# Patient Record
Sex: MALE | Race: WHITE
[De-identification: names, ages, dates, MRNs, and addresses within clinical notes are randomized per-mention and may not be internally consistent; named-entity substitution may affect disease eponyms.]

---

## 2019-11-19 ENCOUNTER — HOSPITAL ENCOUNTER (EMERGENCY)
Dept: HOSPITAL 62 - ER | Age: 49
Discharge: HOME | End: 2019-11-19
Payer: SELF-PAY

## 2019-11-19 VITALS — SYSTOLIC BLOOD PRESSURE: 156 MMHG | DIASTOLIC BLOOD PRESSURE: 105 MMHG

## 2019-11-19 DIAGNOSIS — R00.0: ICD-10-CM

## 2019-11-19 DIAGNOSIS — F10.239: ICD-10-CM

## 2019-11-19 DIAGNOSIS — I10: Primary | ICD-10-CM

## 2019-11-19 DIAGNOSIS — Z79.899: ICD-10-CM

## 2019-11-19 DIAGNOSIS — F17.200: ICD-10-CM

## 2019-11-19 LAB
ADD MANUAL DIFF: NO
ALBUMIN SERPL-MCNC: 5.1 G/DL (ref 3.5–5)
ALP SERPL-CCNC: 98 U/L (ref 38–126)
ANION GAP SERPL CALC-SCNC: 14 MMOL/L (ref 5–19)
APAP SERPL-MCNC: < 10 UG/ML (ref 10–30)
APPEARANCE UR: (no result)
APTT PPP: (no result) S
AST SERPL-CCNC: 205 U/L (ref 17–59)
BARBITURATES UR QL SCN: NEGATIVE
BASOPHILS # BLD AUTO: 0 10^3/UL (ref 0–0.2)
BASOPHILS NFR BLD AUTO: 0.5 % (ref 0–2)
BILIRUB DIRECT SERPL-MCNC: 0.3 MG/DL (ref 0–0.4)
BILIRUB SERPL-MCNC: 1.1 MG/DL (ref 0.2–1.3)
BILIRUB UR QL STRIP: (no result)
BUN SERPL-MCNC: 8 MG/DL (ref 7–20)
CALCIUM: 9.6 MG/DL (ref 8.4–10.2)
CHLORIDE SERPL-SCNC: 98 MMOL/L (ref 98–107)
CO2 SERPL-SCNC: 27 MMOL/L (ref 22–30)
EOSINOPHIL # BLD AUTO: 0 10^3/UL (ref 0–0.6)
EOSINOPHIL NFR BLD AUTO: 0.2 % (ref 0–6)
ERYTHROCYTE [DISTWIDTH] IN BLOOD BY AUTOMATED COUNT: 14.4 % (ref 11.5–14)
ETHANOL SERPL-MCNC: < 10 MG/DL
GLUCOSE SERPL-MCNC: 116 MG/DL (ref 75–110)
GLUCOSE UR STRIP-MCNC: NEGATIVE MG/DL
HCT VFR BLD CALC: 47.8 % (ref 37.9–51)
HGB BLD-MCNC: 16.2 G/DL (ref 13.5–17)
KETONES UR STRIP-MCNC: 20 MG/DL
LYMPHOCYTES # BLD AUTO: 1.1 10^3/UL (ref 0.5–4.7)
LYMPHOCYTES NFR BLD AUTO: 14.5 % (ref 13–45)
MCH RBC QN AUTO: 30.9 PG (ref 27–33.4)
MCHC RBC AUTO-ENTMCNC: 34 G/DL (ref 32–36)
MCV RBC AUTO: 91 FL (ref 80–97)
METHADONE UR QL SCN: NEGATIVE
MONOCYTES # BLD AUTO: 0.4 10^3/UL (ref 0.1–1.4)
MONOCYTES NFR BLD AUTO: 5 % (ref 3–13)
NEUTROPHILS # BLD AUTO: 6.1 10^3/UL (ref 1.7–8.2)
NEUTS SEG NFR BLD AUTO: 79.8 % (ref 42–78)
NITRITE UR QL STRIP: NEGATIVE
PCP UR QL SCN: NEGATIVE
PH UR STRIP: 5 [PH] (ref 5–9)
PLATELET # BLD: 130 10^3/UL (ref 150–450)
POTASSIUM SERPL-SCNC: 3.4 MMOL/L (ref 3.6–5)
PROT SERPL-MCNC: 8.8 G/DL (ref 6.3–8.2)
PROT UR STRIP-MCNC: >=500 MG/DL
RBC # BLD AUTO: 5.25 10^6/UL (ref 4.35–5.55)
SALICYLATES SERPL-MCNC: < 1 MG/DL (ref 2–20)
SP GR UR STRIP: 1.03
TOTAL CELLS COUNTED % (AUTO): 100 %
URINE AMPHETAMINES SCREEN: NEGATIVE
URINE BENZODIAZEPINES SCREEN: (no result)
URINE COCAINE SCREEN: NEGATIVE
URINE MARIJUANA (THC) SCREEN: NEGATIVE
UROBILINOGEN UR-MCNC: NEGATIVE MG/DL (ref ?–2)
WBC # BLD AUTO: 7.7 10^3/UL (ref 4–10.5)

## 2019-11-19 PROCEDURE — 96375 TX/PRO/DX INJ NEW DRUG ADDON: CPT

## 2019-11-19 PROCEDURE — 85025 COMPLETE CBC W/AUTO DIFF WBC: CPT

## 2019-11-19 PROCEDURE — 80307 DRUG TEST PRSMV CHEM ANLYZR: CPT

## 2019-11-19 PROCEDURE — 36415 COLL VENOUS BLD VENIPUNCTURE: CPT

## 2019-11-19 PROCEDURE — 93010 ELECTROCARDIOGRAM REPORT: CPT

## 2019-11-19 PROCEDURE — 80053 COMPREHEN METABOLIC PANEL: CPT

## 2019-11-19 PROCEDURE — 99285 EMERGENCY DEPT VISIT HI MDM: CPT

## 2019-11-19 PROCEDURE — 96365 THER/PROPH/DIAG IV INF INIT: CPT

## 2019-11-19 PROCEDURE — 93005 ELECTROCARDIOGRAM TRACING: CPT

## 2019-11-19 PROCEDURE — 81001 URINALYSIS AUTO W/SCOPE: CPT

## 2019-11-19 PROCEDURE — 96361 HYDRATE IV INFUSION ADD-ON: CPT

## 2019-11-19 NOTE — EKG REPORT
SEVERITY:- ABNORMAL ECG -

SINUS TACHYCARDIA

PROBABLE INFERIOR INFARCT, OLD , CLINICAL CORRELATION NEEDED.

:

Confirmed by: Venu Montague MD 19-Nov-2019 12:11:43

## 2019-11-19 NOTE — ER DOCUMENT REPORT
ED Medical Screen (RME)





- General


Chief Complaint: Alcohol Withdrawl


Stated Complaint: ALCOHOL WITHDRAWL


Time Seen by Provider: 11/19/19 11:26


Mode of Arrival: Ambulatory


Information source: Patient


Notes: 





This 48-year-old male presents emergency department with high blood pressure.  

Patient reports he went to [facility] but they sent him over here because 

his vital signs were off.  Patient reports he has a history of high blood 

pressure has not taken his medications this morning.  Patient reports he drinks 

1/5 of whiskey every day for the past 20 years.  He reports he feels a little 

anxious but denies chest pain shortness of breath.  Patient was instructed to 

take his morning antihypertensive medications which were clonidine amlodipine 

and atenolol.








I have greeted and performed a rapid initial assessment of this patient.  A 

comprehensive ED assessment and evaluation of the patient, analysis of test 

results and completion of the medical decision making process will be conducted 

by additional ED providers.  Dictation of this chart was performed using voice 

recognition software; therefore, there may be some unintended grammatical 

errors.





- Related Data


Allergies/Adverse Reactions: 


                                        





No Known Allergies Allergy (Verified 11/19/19 11:26)


   











Physical Exam





- Vital signs


Vitals: 





                                        











Temp Pulse Resp BP Pulse Ox


 


 97.9 F   137 H  22 H  211/133 H  98 


 


 11/19/19 11:21  11/19/19 11:21  11/19/19 11:21  11/19/19 11:21  11/19/19 11:21














Course





- Vital Signs


Vital signs: 





                                        











Temp Pulse Resp BP Pulse Ox


 


 97.9 F   137 H  22 H  211/133 H  98 


 


 11/19/19 11:21  11/19/19 11:21  11/19/19 11:21  11/19/19 11:21  11/19/19 11:21

## 2019-12-31 ENCOUNTER — HOSPITAL ENCOUNTER (INPATIENT)
Dept: HOSPITAL 62 - ER | Age: 49
LOS: 4 days | Discharge: HOME | DRG: 897 | End: 2020-01-04
Attending: INTERNAL MEDICINE | Admitting: INTERNAL MEDICINE
Payer: COMMERCIAL

## 2019-12-31 DIAGNOSIS — Z79.899: ICD-10-CM

## 2019-12-31 DIAGNOSIS — Z78.1: ICD-10-CM
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
## 2019-12-31 DIAGNOSIS — Z28.21: ICD-10-CM

## 2019-12-31 DIAGNOSIS — E66.9: ICD-10-CM

## 2019-12-31 DIAGNOSIS — F32.9: ICD-10-CM

## 2019-12-31 DIAGNOSIS — E87.6: ICD-10-CM

## 2019-12-31 DIAGNOSIS — F17.210: ICD-10-CM

## 2019-12-31 DIAGNOSIS — F43.10: ICD-10-CM

## 2019-12-31 DIAGNOSIS — I10: ICD-10-CM

## 2019-12-31 DIAGNOSIS — F10.231: Primary | ICD-10-CM

## 2019-12-31 DIAGNOSIS — E83.42: ICD-10-CM

## 2019-12-31 LAB
ABSOLUTE LYMPHOCYTES# (MANUAL): 1.8 10^3/UL (ref 0.5–4.7)
ABSOLUTE MONOCYTES # (MANUAL): 0.5 10^3/UL (ref 0.1–1.4)
ADD MANUAL DIFF: YES
ALBUMIN SERPL-MCNC: 5.2 G/DL (ref 3.5–5)
ALP SERPL-CCNC: 145 U/L (ref 38–126)
ANION GAP SERPL CALC-SCNC: 16 MMOL/L (ref 5–19)
ANION GAP SERPL CALC-SCNC: 31 MMOL/L (ref 5–19)
ANISOCYTOSIS BLD QL SMEAR: SLIGHT
APAP SERPL-MCNC: < 10 UG/ML (ref 10–30)
APPEARANCE UR: (no result)
APTT BLD: 25 SEC (ref 23.5–35.8)
APTT PPP: (no result) S
AST SERPL-CCNC: 375 U/L (ref 17–59)
BARBITURATES UR QL SCN: NEGATIVE
BASOPHILS NFR BLD MANUAL: 0 % (ref 0–2)
BILIRUB DIRECT SERPL-MCNC: 1.1 MG/DL (ref 0–0.4)
BILIRUB SERPL-MCNC: 1.9 MG/DL (ref 0.2–1.3)
BILIRUB UR QL STRIP: NEGATIVE
BUN SERPL-MCNC: 3 MG/DL (ref 7–20)
BUN SERPL-MCNC: 4 MG/DL (ref 7–20)
CALCIUM: 7.7 MG/DL (ref 8.4–10.2)
CALCIUM: 9.6 MG/DL (ref 8.4–10.2)
CHLORIDE SERPL-SCNC: 86 MMOL/L (ref 98–107)
CHLORIDE SERPL-SCNC: 95 MMOL/L (ref 98–107)
CO2 SERPL-SCNC: 20 MMOL/L (ref 22–30)
CO2 SERPL-SCNC: 25 MMOL/L (ref 22–30)
EOSINOPHIL NFR BLD MANUAL: 0 % (ref 0–6)
ERYTHROCYTE [DISTWIDTH] IN BLOOD BY AUTOMATED COUNT: 15.6 % (ref 11.5–14)
ETHANOL SERPL-MCNC: 32 MG/DL
GLUCOSE SERPL-MCNC: 110 MG/DL (ref 75–110)
GLUCOSE SERPL-MCNC: 161 MG/DL (ref 75–110)
GLUCOSE UR STRIP-MCNC: 50 MG/DL
HCT VFR BLD CALC: 46.3 % (ref 37.9–51)
HGB BLD-MCNC: 16.5 G/DL (ref 13.5–17)
INR PPP: 0.97
KETONES UR STRIP-MCNC: 20 MG/DL
MCH RBC QN AUTO: 31.5 PG (ref 27–33.4)
MCHC RBC AUTO-ENTMCNC: 35.5 G/DL (ref 32–36)
MCV RBC AUTO: 89 FL (ref 80–97)
METHADONE UR QL SCN: NEGATIVE
MONOCYTES % (MANUAL): 5 % (ref 3–13)
NITRITE UR QL STRIP: NEGATIVE
PCP UR QL SCN: NEGATIVE
PH UR STRIP: 6 [PH] (ref 5–9)
PLATELET # BLD: 120 10^3/UL (ref 150–450)
PLATELET COMMENT: (no result)
POTASSIUM SERPL-SCNC: 2.8 MMOL/L (ref 3.6–5)
POTASSIUM SERPL-SCNC: 2.8 MMOL/L (ref 3.6–5)
PROT SERPL-MCNC: 8.9 G/DL (ref 6.3–8.2)
PROT UR STRIP-MCNC: >=500 MG/DL
PROTHROMBIN TIME: 12.9 SEC (ref 11.4–15.4)
RBC # BLD AUTO: 5.23 10^6/UL (ref 4.35–5.55)
SALICYLATES SERPL-MCNC: < 1 MG/DL (ref 2–20)
SEGMENTED NEUTROPHILS % (MAN): 77 % (ref 42–78)
SP GR UR STRIP: 1.02
TOTAL CELLS COUNTED BLD: 100
URINE AMPHETAMINES SCREEN: NEGATIVE
URINE BENZODIAZEPINES SCREEN: (no result)
URINE COCAINE SCREEN: NEGATIVE
URINE MARIJUANA (THC) SCREEN: NEGATIVE
UROBILINOGEN UR-MCNC: 4 MG/DL (ref ?–2)
VARIANT LYMPHS NFR BLD MANUAL: 18 % (ref 13–45)
WBC # BLD AUTO: 10.2 10^3/UL (ref 4–10.5)

## 2019-12-31 PROCEDURE — 85025 COMPLETE CBC W/AUTO DIFF WBC: CPT

## 2019-12-31 PROCEDURE — 99291 CRITICAL CARE FIRST HOUR: CPT

## 2019-12-31 PROCEDURE — S0119 ONDANSETRON 4 MG: HCPCS

## 2019-12-31 PROCEDURE — 80053 COMPREHEN METABOLIC PANEL: CPT

## 2019-12-31 PROCEDURE — 93005 ELECTROCARDIOGRAM TRACING: CPT

## 2019-12-31 PROCEDURE — 96376 TX/PRO/DX INJ SAME DRUG ADON: CPT

## 2019-12-31 PROCEDURE — 96368 THER/DIAG CONCURRENT INF: CPT

## 2019-12-31 PROCEDURE — 83735 ASSAY OF MAGNESIUM: CPT

## 2019-12-31 PROCEDURE — 82962 GLUCOSE BLOOD TEST: CPT

## 2019-12-31 PROCEDURE — 96365 THER/PROPH/DIAG IV INF INIT: CPT

## 2019-12-31 PROCEDURE — 84132 ASSAY OF SERUM POTASSIUM: CPT

## 2019-12-31 PROCEDURE — 36415 COLL VENOUS BLD VENIPUNCTURE: CPT

## 2019-12-31 PROCEDURE — 83690 ASSAY OF LIPASE: CPT

## 2019-12-31 PROCEDURE — 96361 HYDRATE IV INFUSION ADD-ON: CPT

## 2019-12-31 PROCEDURE — 99238 HOSP IP/OBS DSCHRG MGMT 30/<: CPT

## 2019-12-31 PROCEDURE — 80307 DRUG TEST PRSMV CHEM ANLYZR: CPT

## 2019-12-31 PROCEDURE — 85610 PROTHROMBIN TIME: CPT

## 2019-12-31 PROCEDURE — 93010 ELECTROCARDIOGRAM REPORT: CPT

## 2019-12-31 PROCEDURE — 85027 COMPLETE CBC AUTOMATED: CPT

## 2019-12-31 PROCEDURE — 80048 BASIC METABOLIC PNL TOTAL CA: CPT

## 2019-12-31 PROCEDURE — 81001 URINALYSIS AUTO W/SCOPE: CPT

## 2019-12-31 PROCEDURE — 96375 TX/PRO/DX INJ NEW DRUG ADDON: CPT

## 2019-12-31 PROCEDURE — 99232 SBSQ HOSP IP/OBS MODERATE 35: CPT

## 2019-12-31 PROCEDURE — 85730 THROMBOPLASTIN TIME PARTIAL: CPT

## 2019-12-31 RX ADMIN — MAGNESIUM SULFATE IN DEXTROSE SCH MLS/HR: 10 INJECTION, SOLUTION INTRAVENOUS at 14:44

## 2019-12-31 RX ADMIN — SODIUM CHLORIDE PRN MLS/HR: 9 INJECTION, SOLUTION INTRAVENOUS at 13:33

## 2019-12-31 RX ADMIN — POTASSIUM CHLORIDE SCH MLS/HR: 29.8 INJECTION, SOLUTION INTRAVENOUS at 23:29

## 2019-12-31 RX ADMIN — SODIUM CHLORIDE PRN MLS/HR: 9 INJECTION, SOLUTION INTRAVENOUS at 13:01

## 2019-12-31 RX ADMIN — METOCLOPRAMIDE SCH MLS/HR: 5 INJECTION, SOLUTION INTRAMUSCULAR; INTRAVENOUS at 18:00

## 2019-12-31 RX ADMIN — POTASSIUM CHLORIDE SCH MLS/HR: 29.8 INJECTION, SOLUTION INTRAVENOUS at 20:46

## 2019-12-31 RX ADMIN — DEXMEDETOMIDINE HYDROCHLORIDE PRN MLS/HR: 4 INJECTION, SOLUTION INTRAVENOUS at 17:45

## 2019-12-31 RX ADMIN — Medication SCH: at 21:00

## 2019-12-31 RX ADMIN — MAGNESIUM SULFATE IN DEXTROSE SCH MLS/HR: 10 INJECTION, SOLUTION INTRAVENOUS at 15:24

## 2019-12-31 RX ADMIN — INSULIN HUMAN SCH: 100 INJECTION, SOLUTION PARENTERAL at 18:23

## 2019-12-31 RX ADMIN — LABETALOL HYDROCHLORIDE PRN MG: 5 INJECTION, SOLUTION INTRAVENOUS at 18:15

## 2019-12-31 RX ADMIN — SODIUM CHLORIDE, SODIUM LACTATE, POTASSIUM CHLORIDE, AND CALCIUM CHLORIDE PRN MLS/HR: .6; .31; .03; .02 INJECTION, SOLUTION INTRAVENOUS at 20:41

## 2019-12-31 NOTE — CRITICAL CARE ADMISSION REPORT
HPI


Date:: 12/31/19 - Critical Care Attending Note


HPI: 





Pt is a 50 yo man with alcohol abuse who drinks 1/5 Vodka each day. His last 

drink was around 2 am this am. He presents to the ED with N/V, HTN, tremors. He 

was found to be in alcohol withdrawal. He received several dosed of IV ativan 

and was ultimately started on an ativan infusion. Upon my assessment of the pt, 

he is still in the ED awaiting an ICU bed. He states he has tried to detox from 

ETOH before and ended up in the hospital for 5 days for ETOH withdrawal. He 

states he has never had to be intubated. He denies any 

F/C/CP/SOA/Melena/hemoptysis/hematochezia. 





Past Medical History


Past Medical History: 





HTN


Depression


Pulmonary Medical History: Reports: None





Social/Family History





- Social History


Smoking Status: Current Every Day Smoker


Frequency of Alcohol Use: Heavy


Last Alcohol Use: 12/31/19





- Medication/Allergies


Home Medications: 








Amlodipine Besylate [Norvasc 10 mg Tablet] 1 mg PO DAILY 12/31/19 


Atenolol 100 mg PO DAILY 12/31/19 








Allergies/Adverse Reactions: 


                                        





No Known Allergies Allergy (Verified 11/19/19 11:26)


   











Review of Systems


Review of Systems: 





per HPI





Physical Exam


Vital Signs: 


                                        











Temp Pulse Resp BP Pulse Ox


 


 98.2 F   139 H  29 H  158/90 H  94 


 


 12/31/19 16:05  12/31/19 11:04  12/31/19 16:05  12/31/19 16:05  12/31/19 16:05








                                 Intake & Output











 12/30/19 12/31/19 01/01/20





 06:59 06:59 06:59


 


Intake Total   1600


 


Balance   1600


 


Weight   109.5 kg








                                  Weight/Height





Weight                           109.5 kg


Height                           5 ft 9 in








General appearance: PRESENT: well-developed, well-nourished, other - Ill-

apperating, tremulous, diaphoretic


Head exam: PRESENT: atraumatic, normocephalic


Respiratory exam: PRESENT: clear to auscultation cesar, unlabored


Cardiovascular exam: PRESENT: tachycardia


GI/Abdominal exam: PRESENT: soft, other - non-tender,non-distended


Extremities exam: PRESENT: other - no edema


Neurological exam: PRESENT: alert, awake, other - tremulous


Psychiatric exam: PRESENT: anxious





Laboratory/Radiographs


Laboratory Results: 


                                        





                                 12/31/19 13:00 





                                 12/31/19 13:00 





                                        











  12/31/19 12/31/19





  13:00 13:00


 


WBC  10.2 


 


RBC  5.23 


 


Hgb  16.5 


 


Hct  46.3 


 


MCV  89 


 


MCH  31.5 


 


MCHC  35.5 


 


RDW  15.6 H 


 


Plt Count  120 L 


 


Seg Neutrophils %  Not Reportable 


 


Sodium   137.1


 


Potassium   2.8 L*


 


Chloride   86 L


 


Carbon Dioxide   20 L


 


Anion Gap   31 H


 


BUN   4 L


 


Creatinine   1.02


 


Est GFR (African Amer)   > 60


 


Glucose   161 H


 


Calcium   9.6


 


Magnesium   1.0 L*


 


Total Bilirubin   1.9 H


 


AST   375 H


 


Alkaline Phosphatase   145 H


 


Total Protein   8.9 H


 


Albumin   5.2 H


 


Lipase   361.1 H














Critical Time


Critical Time (minutes): 45


-: 


The care of a critically ill patient is dynamic.  This note represents a static 

moment in the admission process. Orders and treatments may be given 

simultaneously and urgently, and time is not representative of the treatment 

process.





This patient requires Critical Care secondary to life threatening organ or limb 

dysfunction.  Without Critical Care services, the patient is at risk for 

increased mortality and morbidity.








Provider Note


Provider Note: 





Assessment: Critically ill 50yo man with acute alcohol withdrawal, alcohol 

abuse, HTN





Plan:


1. Respiratory: stable on NC. Continue to monitor respiratory status closely


2. CV.HTN. Resume home BP meds. Prn labetalol and hydralazine


3. Pscyh: acute alcohol withdrawal. Continue ativan drip while in ED. Once pt 

gets to ICU, will d/c ativan drip and start precedex. CIWA scale. IV thiamine 

and folic acid.


4. Electroyles: hypokalemia and hypomagnesemia. Potassium and magnesium 

replaced. Will repeat potassium and magnesium levels later today


5. Nutrition:NPO


6. Endocrine: monitor blood sugars


7. Prophylaxis: sq heparin





Critical care time= 45 min, excluding procedures

## 2019-12-31 NOTE — EKG REPORT
SEVERITY:- ABNORMAL ECG -

SINUS TACHYCARDIA

BORDERLINE INFERIOR Q WAVES

REPOL ABNRM SUGGESTS ISCHEMIA, DIFFUSE LEADS

BIATRIAL ENLARGEMENT

:

Confirmed by: Venu Montague MD 31-Dec-2019 13:43:04

## 2019-12-31 NOTE — ER DOCUMENT REPORT
ED General





- General


Chief Complaint: Alcohol Withdrawl


Stated Complaint: ETOH WITHDRAWAL


Time Seen by Provider: 12/31/19 11:15


Primary Care Provider: 


VIDHYA SIMONS MD [Primary Care Provider] - Follow up as needed


Mode of Arrival: Ambulatory


Notes: 





49-year-old male presents with possible alcohol withdrawal.  Patient states he 

has been drinking 1/5 of bourbon every day for the last 10 years and last drink 

was approximately 2:00 this morning.  Patient states he feels anxious, has trem

ors, nausea/vomiting, abdominal pain, feeling sweaty for the past few days.  

Patient states he has gone into withdrawal before requiring admission however 

has never had seizures.


TRAVEL OUTSIDE OF THE [place] IN LAST 30 DAYS: No





- Related Data


Allergies/Adverse Reactions: 


                                        





No Known Allergies Allergy (Verified 11/19/19 11:26)


   











Past Medical History





- General


Information source: Patient





- Social History


Smoking Status: Current Every Day Smoker


Chew tobacco use (# tins/day): No


Frequency of alcohol use: 1/5th Alcohol daily


Drug Abuse: None


Family History: Reviewed & Not Pertinent


Patient has suicidal ideation: No


Patient has homicidal ideation: No





Review of Systems





- Review of Systems


Notes: 





Constitutional: Positive for anxiety and diaphoresis.  Negative for fever.


HENT: Negative for sore throat.


Eyes: Negative for visual changes.


Cardiovascular: Negative for chest pain.


Respiratory: Negative for shortness of breath.


Gastrointestinal: Positive for abdominal pain, nausea, vomiting.  Negative for 

diarrhea.


Genitourinary: Negative for dysuria.


Musculoskeletal: Negative for back pain.


Skin: Negative for rash.


Neurological: Positive for tremors. Negative for headaches, weakness or 

numbness.





10 point ROS negative except as marked above and in HPI.





Physical Exam





- Vital signs


Vitals: 


                                        











Temp Pulse Resp BP Pulse Ox


 


 97.5 F   139 H  20   177/106 H  98 


 


 12/31/19 11:04  12/31/19 11:04  12/31/19 11:04  12/31/19 11:04  12/31/19 11:04














- Notes


Notes: 





GENERAL: Well-appearing, well-nourished and anxious.


HEAD: Atraumatic, normocephalic.


EYES: Extraocular movements intact, sclera anicteric, conjunctiva are normal.


NECK: Normal range of motion, supple without lymphadenopathy or JVD.


LUNGS: Breath sounds clear to auscultation bilaterally and equal.  No wheezes 

rales or rhonchi.


HEART: Regular rate and rhythm without murmurs, rubs or gallops.


ABDOMEN: Soft, nontender.  No guarding, no rebound.  No masses appreciated.


EXTREMITIES: Normal range of motion, no pitting or edema.  No clubbing or 

cyanosis.


NEUROLOGICAL: Cranial nerves II through XII grossly intact.  Normal speech, 

normal gait.


PSYCH: Normal mood, normal affect.


SKIN: Warm, Dry, normal turgor, no rashes or lesions noted.





Course





- Re-evaluation


Re-evalutation: 





12/31/19 11:56 patient presents for possible alcohol withdrawal.  Patient has 

tremors, anxiety, abdominal pain, nausea/vomiting.  Patient appears anxious.  

Patient abdomen soft nontender.  CIWA ordered along with lab work.  Vistaril, 

Zofran, Valium ordered.





12/31/19 12:17 CIWA 13





12/31/19 13:12 Pt continues to have tremor and elevated BP. Discussed pt with 

Dr. Kruse, attending, who recommended another dose of Valium and Ativan IV and

admission once labwork resulted. 





12/31/19 14:07 Pt's Mg is 1.0 and K is 2.8.





12/31/19 14:14 Discussed pt with Dr. Ray, hospitalist, who stated that pt may

need ICU due to benzo drip. Discussed with Dr. Mackenzie, intensivist, who 

accepted pt for admission.








- Vital Signs


Vital signs: 


                                        











Temp Pulse Resp BP Pulse Ox


 


 97.5 F   139 H  20   203/135 H  96 


 


 12/31/19 11:04  12/31/19 11:04  12/31/19 14:00  12/31/19 13:01  12/31/19 14:00














- Laboratory


Result Diagrams: 


                                 12/31/19 13:00





                                 12/31/19 13:00


Laboratory results interpreted by me: 


                                        











  12/31/19 12/31/19





  13:00 13:00


 


RDW  15.6 H 


 


Plt Count  120 L 


 


Potassium   2.8 L*


 


Chloride   86 L


 


Carbon Dioxide   20 L


 


Anion Gap   31 H


 


BUN   4 L


 


Glucose   161 H


 


Magnesium   1.0 L*


 


Total Bilirubin   1.9 H


 


Direct Bilirubin   1.1 H


 


AST   375 H


 


Alkaline Phosphatase   145 H


 


Total Protein   8.9 H


 


Albumin   5.2 H


 


Lipase   361.1 H


 


Salicylates   < 1.0 L


 


Acetaminophen   < 10 L














Discharge





- Discharge


Clinical Impression: 


 Hypokalemia, Hypomagnesemia





Alcohol withdrawal


Qualifiers:


 Complication of substance-induced condition: with unspecified complication 

Qualified Code(s): F10.239 - Alcohol dependence with withdrawal, unspecified





Condition: Stable


Disposition: ADMITTED AS INPATIENT


Admitting Provider: Gurdeep (Intensivist)


Unit Admitted: ICU


Referrals: 


VIDHYA SIMONS MD [Primary Care Provider] - Follow up as needed

## 2020-01-01 LAB
ANION GAP SERPL CALC-SCNC: 12 MMOL/L (ref 5–19)
ANION GAP SERPL CALC-SCNC: 15 MMOL/L (ref 5–19)
BUN SERPL-MCNC: 2 MG/DL (ref 7–20)
BUN SERPL-MCNC: 2 MG/DL (ref 7–20)
CALCIUM: 8 MG/DL (ref 8.4–10.2)
CALCIUM: 8.6 MG/DL (ref 8.4–10.2)
CHLORIDE SERPL-SCNC: 90 MMOL/L (ref 98–107)
CHLORIDE SERPL-SCNC: 95 MMOL/L (ref 98–107)
CO2 SERPL-SCNC: 29 MMOL/L (ref 22–30)
CO2 SERPL-SCNC: 29 MMOL/L (ref 22–30)
ERYTHROCYTE [DISTWIDTH] IN BLOOD BY AUTOMATED COUNT: 15.3 % (ref 11.5–14)
GLUCOSE SERPL-MCNC: 111 MG/DL (ref 75–110)
GLUCOSE SERPL-MCNC: 132 MG/DL (ref 75–110)
HCT VFR BLD CALC: 37.6 % (ref 37.9–51)
HGB BLD-MCNC: 13.2 G/DL (ref 13.5–17)
MCH RBC QN AUTO: 31.5 PG (ref 27–33.4)
MCHC RBC AUTO-ENTMCNC: 35.2 G/DL (ref 32–36)
MCV RBC AUTO: 90 FL (ref 80–97)
PLATELET # BLD: 72 10^3/UL (ref 150–450)
POTASSIUM SERPL-SCNC: 2.7 MMOL/L (ref 3.6–5)
POTASSIUM SERPL-SCNC: 2.9 MMOL/L (ref 3.6–5)
RBC # BLD AUTO: 4.2 10^6/UL (ref 4.35–5.55)
WBC # BLD AUTO: 4.4 10^3/UL (ref 4–10.5)

## 2020-01-01 RX ADMIN — MULTIVITAMIN TABLET SCH TAB: TABLET at 10:35

## 2020-01-01 RX ADMIN — METOCLOPRAMIDE SCH MLS/HR: 5 INJECTION, SOLUTION INTRAMUSCULAR; INTRAVENOUS at 17:26

## 2020-01-01 RX ADMIN — SODIUM CHLORIDE, SODIUM LACTATE, POTASSIUM CHLORIDE, AND CALCIUM CHLORIDE PRN MLS/HR: .6; .31; .03; .02 INJECTION, SOLUTION INTRAVENOUS at 07:09

## 2020-01-01 RX ADMIN — Medication SCH: at 22:02

## 2020-01-01 RX ADMIN — POTASSIUM CHLORIDE SCH MLS/HR: 29.8 INJECTION, SOLUTION INTRAVENOUS at 05:39

## 2020-01-01 RX ADMIN — LORAZEPAM SCH: 1 TABLET ORAL at 17:33

## 2020-01-01 RX ADMIN — LORAZEPAM PRN MG: 2 INJECTION INTRAMUSCULAR; INTRAVENOUS at 17:33

## 2020-01-01 RX ADMIN — Medication SCH: at 05:22

## 2020-01-01 RX ADMIN — LORAZEPAM SCH MG: 1 TABLET ORAL at 22:00

## 2020-01-01 RX ADMIN — INSULIN HUMAN SCH: 100 INJECTION, SOLUTION PARENTERAL at 13:15

## 2020-01-01 RX ADMIN — POTASSIUM CHLORIDE SCH MLS/HR: 29.8 INJECTION, SOLUTION INTRAVENOUS at 03:36

## 2020-01-01 RX ADMIN — AMLODIPINE BESYLATE SCH: 10 TABLET ORAL at 10:35

## 2020-01-01 RX ADMIN — MAGNESIUM SULFATE IN DEXTROSE SCH MLS/HR: 10 INJECTION, SOLUTION INTRAVENOUS at 17:32

## 2020-01-01 RX ADMIN — DEXMEDETOMIDINE HYDROCHLORIDE PRN MLS/HR: 4 INJECTION, SOLUTION INTRAVENOUS at 01:06

## 2020-01-01 RX ADMIN — HEPARIN SODIUM SCH: 5000 INJECTION, SOLUTION INTRAVENOUS; SUBCUTANEOUS at 13:16

## 2020-01-01 RX ADMIN — ATENOLOL SCH: 50 TABLET ORAL at 10:36

## 2020-01-01 RX ADMIN — POTASSIUM CHLORIDE SCH MEQ: 750 TABLET, FILM COATED, EXTENDED RELEASE ORAL at 22:00

## 2020-01-01 RX ADMIN — POTASSIUM CHLORIDE SCH MEQ: 750 TABLET, FILM COATED, EXTENDED RELEASE ORAL at 13:29

## 2020-01-01 RX ADMIN — Medication SCH: at 13:29

## 2020-01-01 RX ADMIN — POTASSIUM CHLORIDE SCH MLS/HR: 29.8 INJECTION, SOLUTION INTRAVENOUS at 08:51

## 2020-01-01 RX ADMIN — INSULIN HUMAN SCH: 100 INJECTION, SOLUTION PARENTERAL at 05:46

## 2020-01-01 RX ADMIN — LORAZEPAM SCH: 1 TABLET ORAL at 17:34

## 2020-01-01 RX ADMIN — INSULIN HUMAN SCH: 100 INJECTION, SOLUTION PARENTERAL at 01:09

## 2020-01-01 RX ADMIN — HEPARIN SODIUM SCH: 5000 INJECTION, SOLUTION INTRAVENOUS; SUBCUTANEOUS at 22:03

## 2020-01-01 RX ADMIN — HYDROCHLOROTHIAZIDE SCH: 25 TABLET ORAL at 10:34

## 2020-01-01 RX ADMIN — MAGNESIUM SULFATE IN DEXTROSE SCH MLS/HR: 10 INJECTION, SOLUTION INTRAVENOUS at 13:29

## 2020-01-01 RX ADMIN — HEPARIN SODIUM SCH: 5000 INJECTION, SOLUTION INTRAVENOUS; SUBCUTANEOUS at 05:46

## 2020-01-01 RX ADMIN — SODIUM CHLORIDE, SODIUM LACTATE, POTASSIUM CHLORIDE, AND CALCIUM CHLORIDE PRN MLS/HR: .6; .31; .03; .02 INJECTION, SOLUTION INTRAVENOUS at 22:11

## 2020-01-01 NOTE — PDOC CRITICAL CARE PROG REPORT
General


Date:: 01/01/20


ICU Day:: 2


Hospital Day:: 2


Resuscitation Status: Full Code


Events in the past 12 to 24 Hours:: 





HR and BP stable. Not agitated.


Review of systems relevant to events:: 





Neuro, CV.


Reason for ICU Addmission:: Possible need for high dose ativan and intubation





- Medications:


Medications reviewed and adjusted accordingly: Yes


Vasopressors:: 





None


Sedation:: 





Precedex





Physical Exam


Vital Signs: 


                                        











Temp Pulse Resp BP Pulse Ox


 


 98.5 F   96   18   98/71 L  99 


 


 01/01/20 08:00  01/01/20 10:00  01/01/20 11:36  01/01/20 11:36  01/01/20 11:00








                                 Intake & Output











 12/31/19 01/01/20 01/02/20





 06:59 06:59 06:59


 


Intake Total  4434.2 1050


 


Output Total  875 200


 


Balance  3559.2 850


 


Weight  112.4 kg 








                                  Weight/Height





Weight                           112.4 kg


Height                           5 ft 9 in








General appearance: PRESENT: no acute distress, cooperative


Head exam: PRESENT: atraumatic, normocephalic


Eye exam: PRESENT: EOMI, PERRLA


Ear exam: PRESENT: normal external ear exam


Mouth exam: PRESENT: dry mucosa


Respiratory exam: PRESENT: clear to auscultation cesar.  ABSENT: rales, rhonchi, 

wheezes


Cardiovascular exam: PRESENT: RRR.  ABSENT: diastolic murmur, rubs, systolic 

murmur


GI/Abdominal exam: PRESENT: normal bowel sounds, soft.  ABSENT: distended, 

guarding, mass, organolmegaly, rebound, tenderness


Rectal exam: PRESENT: deferred


Extremities exam: PRESENT: full ROM.  ABSENT: calf tenderness, clubbing, pedal 

edema


Musculoskeletal exam: PRESENT: ambulatory


Neurological exam: PRESENT: alert, altered, awake, oriented to person, oriented 

to place, oriented to time, oriented to situation


Skin exam: PRESENT: dry, intact, warm.  ABSENT: cyanosis, rash





Laboratory/Radiographs


Laboratory Results: 


                                        





                                 01/01/20 02:27 





                                 01/01/20 06:07 





                                        











  12/31/19 12/31/19 12/31/19





  13:00 13:00 16:00


 


WBC  10.2  


 


RBC  5.23  


 


Hgb  16.5  


 


Hct  46.3  


 


MCV  89  


 


MCH  31.5  


 


MCHC  35.5  


 


RDW  15.6 H  


 


Plt Count  120 L  


 


Seg Neutrophils %  Not Reportable  


 


Sodium   137.1 


 


Potassium   2.8 L* 


 


Chloride   86 L 


 


Carbon Dioxide   20 L 


 


Anion Gap   31 H 


 


BUN   4 L 


 


Creatinine   1.02 


 


Est GFR ( Amer)   > 60 


 


Est GFR (Non-Af Amer)   


 


Glucose   161 H 


 


Calcium   9.6 


 


Magnesium   1.0 L* 


 


Total Bilirubin   1.9 H 


 


AST   375 H 


 


Alkaline Phosphatase   145 H 


 


Total Protein   8.9 H 


 


Albumin   5.2 H 


 


Lipase   361.1 H 


 


Urine Color    YOSHI


 


Urine Appearance    SLIGHTLY-CLOUDY


 


Urine pH    6.0


 


Ur Specific Gravity    1.022


 


Urine Protein    >=500 H


 


Urine Glucose (UA)    50 H


 


Urine Ketones    20 H


 


Urine Blood    MODERATE H


 


Urine Nitrite    NEGATIVE


 


Ur Leukocyte Esterase    NEGATIVE


 


Urine WBC (Auto)    1


 


Urine RBC (Auto)    0














  12/31/19 01/01/20 01/01/20





  19:15 02:27 02:27


 


WBC   4.4 


 


RBC   4.20 L 


 


Hgb   13.2 L D 


 


Hct   37.6 L 


 


MCV   90 


 


MCH   31.5 


 


MCHC   35.2 


 


RDW   15.3 H 


 


Plt Count   72 L 


 


Seg Neutrophils %   


 


Sodium  135.6 L   136.2 L


 


Potassium  2.8 L*   2.7 L*


 


Chloride  95 L   95 L


 


Carbon Dioxide  25   29


 


Anion Gap  16   12


 


BUN  3 L   2 L


 


Creatinine  0.87   0.86


 


Est GFR ( Amer)  > 60   > 60


 


Est GFR (Non-Af Amer)   


 


Glucose  110   111 H


 


Calcium  7.7 L   8.0 L


 


Magnesium  1.4 L   1.5 L


 


Total Bilirubin   


 


AST   


 


Alkaline Phosphatase   


 


Total Protein   


 


Albumin   


 


Lipase   


 


Urine Color   


 


Urine Appearance   


 


Urine pH   


 


Ur Specific Gravity   


 


Urine Protein   


 


Urine Glucose (UA)   


 


Urine Ketones   


 


Urine Blood   


 


Urine Nitrite   


 


Ur Leukocyte Esterase   


 


Urine WBC (Auto)   


 


Urine RBC (Auto)   














  01/01/20





  06:07


 


WBC 


 


RBC 


 


Hgb 


 


Hct 


 


MCV 


 


MCH 


 


MCHC 


 


RDW 


 


Plt Count 


 


Seg Neutrophils % 


 


Sodium  Cancelled


 


Potassium  Cancelled


 


Chloride  Cancelled


 


Carbon Dioxide  Cancelled


 


Anion Gap  Cancelled


 


BUN  Cancelled


 


Creatinine  Cancelled


 


Est GFR ( Amer)  Cancelled


 


Est GFR (Non-Af Amer)  Cancelled


 


Glucose  Cancelled


 


Calcium  Cancelled


 


Magnesium  Cancelled


 


Total Bilirubin 


 


AST 


 


Alkaline Phosphatase 


 


Total Protein 


 


Albumin 


 


Lipase 


 


Urine Color 


 


Urine Appearance 


 


Urine pH 


 


Ur Specific Gravity 


 


Urine Protein 


 


Urine Glucose (UA) 


 


Urine Ketones 


 


Urine Blood 


 


Urine Nitrite 


 


Ur Leukocyte Esterase 


 


Urine WBC (Auto) 


 


Urine RBC (Auto) 











All labs, radiographs, diagnostic studies and EKGs were personally reviewed: Yes


In addition, reports of radiographic and diagnostic studies were read: Yes





Assessment and Plan





- Diagnosis


(1) Alcohol withdrawal


Qualifiers: 


   Complication of substance-induced condition: with unspecified complication   

Qualified Code(s): F10.239 - Alcohol dependence with withdrawal, unspecified   


Is this a current diagnosis for this admission?: Yes   


Plan: 


Continue ativan. Try off Precedex. If stable consider downgrade.








(2) Hypokalemia


Is this a current diagnosis for this admission?: Yes   


Plan: 


Level still 2.7. Continue loading








(3) Hypomagnesemia


Is this a current diagnosis for this admission?: Yes   


Plan: 


Level 1.5 continue loading.





Plan Summary: 





Give more potassium and magnesium. Try stopping precedex.





Critical Time


Critical Time (minutes): 35


Level of Care: ICU


Anticipated discharge: Home


Within: Other - 4-5 days


-: 


1.  The care of a critical patient is a dynamic process.  This note is a 

representative synopsis but static in nature.  The timeframe for treatments 

given in order is not necessarily the actual time these treatments may have been

done.





2.  This patient requires critical care secondary to ongoing requirements for 

therapy not offered or safe outside the critical care environment.  Transfer to 

a lower level of care will result in altered life or limb morbidity and mortal

ity.





3.  Multidisciplinary rounds completed.





4.  ABCDE bundle addressed.

## 2020-01-02 LAB
ANION GAP SERPL CALC-SCNC: 11 MMOL/L (ref 5–19)
BUN SERPL-MCNC: < 2 MG/DL (ref 7–20)
CALCIUM: 8.8 MG/DL (ref 8.4–10.2)
CHLORIDE SERPL-SCNC: 94 MMOL/L (ref 98–107)
CO2 SERPL-SCNC: 33 MMOL/L (ref 22–30)
ERYTHROCYTE [DISTWIDTH] IN BLOOD BY AUTOMATED COUNT: 15.3 % (ref 11.5–14)
GLUCOSE SERPL-MCNC: 103 MG/DL (ref 75–110)
HCT VFR BLD CALC: 38.9 % (ref 37.9–51)
HGB BLD-MCNC: 13.7 G/DL (ref 13.5–17)
MCH RBC QN AUTO: 31.7 PG (ref 27–33.4)
MCHC RBC AUTO-ENTMCNC: 35.3 G/DL (ref 32–36)
MCV RBC AUTO: 90 FL (ref 80–97)
PLATELET # BLD: 82 10^3/UL (ref 150–450)
POTASSIUM SERPL-SCNC: 3 MMOL/L (ref 3.6–5)
RBC # BLD AUTO: 4.34 10^6/UL (ref 4.35–5.55)
WBC # BLD AUTO: 4.6 10^3/UL (ref 4–10.5)

## 2020-01-02 RX ADMIN — DEXMEDETOMIDINE HYDROCHLORIDE PRN MLS/HR: 4 INJECTION, SOLUTION INTRAVENOUS at 06:23

## 2020-01-02 RX ADMIN — Medication SCH: at 06:26

## 2020-01-02 RX ADMIN — LORAZEPAM SCH: 1 TABLET ORAL at 11:16

## 2020-01-02 RX ADMIN — HEPARIN SODIUM SCH: 5000 INJECTION, SOLUTION INTRAVENOUS; SUBCUTANEOUS at 21:02

## 2020-01-02 RX ADMIN — ATENOLOL SCH: 50 TABLET ORAL at 11:19

## 2020-01-02 RX ADMIN — POTASSIUM CHLORIDE SCH MLS/HR: 29.8 INJECTION, SOLUTION INTRAVENOUS at 18:18

## 2020-01-02 RX ADMIN — LORAZEPAM PRN MG: 2 INJECTION INTRAMUSCULAR; INTRAVENOUS at 19:12

## 2020-01-02 RX ADMIN — DEXMEDETOMIDINE HYDROCHLORIDE PRN MLS/HR: 4 INJECTION, SOLUTION INTRAVENOUS at 11:00

## 2020-01-02 RX ADMIN — AMLODIPINE BESYLATE SCH: 10 TABLET ORAL at 11:18

## 2020-01-02 RX ADMIN — LORAZEPAM PRN MG: 2 INJECTION INTRAMUSCULAR; INTRAVENOUS at 23:59

## 2020-01-02 RX ADMIN — POTASSIUM CHLORIDE SCH: 750 TABLET, FILM COATED, EXTENDED RELEASE ORAL at 06:26

## 2020-01-02 RX ADMIN — LORAZEPAM PRN MG: 2 INJECTION INTRAMUSCULAR; INTRAVENOUS at 11:14

## 2020-01-02 RX ADMIN — LORAZEPAM SCH MG: 1 TABLET ORAL at 01:05

## 2020-01-02 RX ADMIN — LORAZEPAM SCH: 1 TABLET ORAL at 21:39

## 2020-01-02 RX ADMIN — DEXMEDETOMIDINE HYDROCHLORIDE PRN MLS/HR: 4 INJECTION, SOLUTION INTRAVENOUS at 14:29

## 2020-01-02 RX ADMIN — POTASSIUM CHLORIDE SCH MLS/HR: 29.8 INJECTION, SOLUTION INTRAVENOUS at 19:50

## 2020-01-02 RX ADMIN — MULTIVITAMIN TABLET SCH: TABLET at 11:16

## 2020-01-02 RX ADMIN — LORAZEPAM SCH: 1 TABLET ORAL at 17:25

## 2020-01-02 RX ADMIN — SODIUM CHLORIDE, SODIUM LACTATE, POTASSIUM CHLORIDE, AND CALCIUM CHLORIDE PRN MLS/HR: .6; .31; .03; .02 INJECTION, SOLUTION INTRAVENOUS at 21:48

## 2020-01-02 RX ADMIN — LORAZEPAM SCH: 1 TABLET ORAL at 13:40

## 2020-01-02 RX ADMIN — LORAZEPAM PRN MG: 2 INJECTION INTRAMUSCULAR; INTRAVENOUS at 15:56

## 2020-01-02 RX ADMIN — Medication SCH: at 13:17

## 2020-01-02 RX ADMIN — DEXMEDETOMIDINE HYDROCHLORIDE PRN MLS/HR: 4 INJECTION, SOLUTION INTRAVENOUS at 03:18

## 2020-01-02 RX ADMIN — HEPARIN SODIUM SCH: 5000 INJECTION, SOLUTION INTRAVENOUS; SUBCUTANEOUS at 13:19

## 2020-01-02 RX ADMIN — Medication SCH: at 21:01

## 2020-01-02 RX ADMIN — LORAZEPAM SCH: 1 TABLET ORAL at 05:40

## 2020-01-02 RX ADMIN — MAGNESIUM SULFATE IN DEXTROSE SCH MLS/HR: 10 INJECTION, SOLUTION INTRAVENOUS at 09:50

## 2020-01-02 RX ADMIN — MAGNESIUM SULFATE IN DEXTROSE SCH MLS/HR: 10 INJECTION, SOLUTION INTRAVENOUS at 08:56

## 2020-01-02 RX ADMIN — LORAZEPAM PRN MG: 2 INJECTION INTRAMUSCULAR; INTRAVENOUS at 05:31

## 2020-01-02 RX ADMIN — HYDROCHLOROTHIAZIDE SCH: 25 TABLET ORAL at 11:16

## 2020-01-02 RX ADMIN — LORAZEPAM PRN MG: 2 INJECTION INTRAMUSCULAR; INTRAVENOUS at 13:46

## 2020-01-02 RX ADMIN — DEXMEDETOMIDINE HYDROCHLORIDE PRN MLS/HR: 4 INJECTION, SOLUTION INTRAVENOUS at 23:42

## 2020-01-02 RX ADMIN — METOCLOPRAMIDE SCH MLS/HR: 5 INJECTION, SOLUTION INTRAMUSCULAR; INTRAVENOUS at 17:24

## 2020-01-02 RX ADMIN — HEPARIN SODIUM SCH: 5000 INJECTION, SOLUTION INTRAVENOUS; SUBCUTANEOUS at 06:26

## 2020-01-02 NOTE — PDOC CRITICAL CARE PROG REPORT
General


Date:: 01/02/20


ICU Day:: 3


Hospital Day:: 3


Resuscitation Status: Full Code


Events in the past 12 to 24 Hours:: 





More agitated and in 4 point restraints.


Review of systems relevant to events:: 





Neuro, CV.


Reason for ICU Addmission:: Possible need for high dose ativan and intubation





- Medications:


Medications reviewed and adjusted accordingly: Yes


Vasopressors:: 





None


Sedation:: 





Ativan and precedex





Physical Exam


Vital Signs: 


                                        











Temp Pulse Resp BP Pulse Ox


 


 98.6 F   90   17   147/94 H  96 


 


 01/02/20 03:32  01/01/20 20:00  01/02/20 02:33  01/02/20 02:33  01/02/20 02:33








                                 Intake & Output











 01/01/20 01/02/20 01/03/20





 06:59 06:59 06:59


 


Intake Total 4434.2 3465 


 


Output Total 875 2500 


 


Balance 3559.2 965 


 


Weight 112.4 kg 111.9 kg 








                                  Weight/Height





Weight                           111.9 kg


Height                           5 ft 9 in








General appearance: PRESENT: obese


Head exam: PRESENT: atraumatic, normocephalic


Eye exam: PRESENT: conjunctiva pink, EOMI, PERRLA.  ABSENT: scleral icterus


Ear exam: PRESENT: normal external ear exam


Mouth exam: PRESENT: moist, tongue midline


Respiratory exam: PRESENT: clear to auscultation cesar.  ABSENT: rales, rhonchi, 

wheezes


Cardiovascular exam: PRESENT: tachycardia


Vascular exam: PRESENT: normal capillary refill


GI/Abdominal exam: PRESENT: normal bowel sounds, soft.  ABSENT: distended, 

guarding, mass, organolmegaly, rebound, tenderness


Rectal exam: PRESENT: deferred


Extremities exam: PRESENT: full ROM.  ABSENT: calf tenderness, clubbing, pedal 

edema


Musculoskeletal exam: PRESENT: normal inspection


Neurological exam: PRESENT: alert, altered, awake


Psychiatric exam: PRESENT: agitated


Skin exam: PRESENT: dry, intact, warm.  ABSENT: cyanosis, rash





Laboratory/Radiographs


Laboratory Results: 


                                        





                                 01/02/20 03:47 





                                 01/02/20 03:47 





                                        











  01/01/20 01/02/20 01/02/20





  13:45 03:47 03:47


 


WBC   4.6 


 


RBC   4.34 L 


 


Hgb   13.7 


 


Hct   38.9 


 


MCV   90 


 


MCH   31.7 


 


MCHC   35.3 


 


RDW   15.3 H 


 


Plt Count   82 L 


 


Sodium  133.8 L   138.1


 


Potassium  2.9 L*   3.0 L*


 


Chloride  90 L   94 L


 


Carbon Dioxide  29   33 H


 


Anion Gap  15   11


 


BUN  2 L   < 2 L


 


Creatinine  0.83   0.80


 


Est GFR ( Amer)  > 60   > 60


 


Glucose  132 H   103


 


Calcium  8.6   8.8


 


Magnesium    1.9











EKG: 





SR peaked T-waves.


All labs, radiographs, diagnostic studies and EKGs were personally reviewed: Yes


In addition, reports of radiographic and diagnostic studies were read: Yes





Assessment and Plan





- Diagnosis


(1) Alcohol withdrawal


Qualifiers: 


   Complication of substance-induced condition: with unspecified complication   

Qualified Code(s): F10.239 - Alcohol dependence with withdrawal, unspecified   


Is this a current diagnosis for this admission?: Yes   


Plan: 


Getting more agitated. Now in restraints for safety. Ativan increased, precedex 

ordered. No farooq for safety's sake.








(2) Hypokalemia


Is this a current diagnosis for this admission?: Yes   


Plan: 


Still at a level of 3.0. Magnesim in normal range. Will give more and try to 

stabilize potassium as well.








(3) Hypomagnesemia


Is this a current diagnosis for this admission?: Yes   


Plan: 


2 more grams ordered.





Plan Summary: 





Boost potassium by increasing magnesium. Increased need for ativan. Hitting a 

peak in terms of ETOH WD at day 3.





Critical Time


Critical Time (minutes): 35


Level of Care: ICU


Anticipated discharge: Home


Within: Other - Too soon to speculate.


-: 


1.  The care of a critical patient is a dynamic process.  This note is a 

representative synopsis but static in nature.  The timeframe for treatments 

given in order is not necessarily the actual time these treatments may have been

done.





2.  This patient requires critical care secondary to ongoing requirements for 

therapy not offered or safe outside the critical care environment.  Transfer to 

a lower level of care will result in altered life or limb morbidity and 

mortality.





3.  Multidisciplinary rounds completed.





4.  ABCDE bundle addressed.

## 2020-01-03 LAB
ANION GAP SERPL CALC-SCNC: 12 MMOL/L (ref 5–19)
BUN SERPL-MCNC: 3 MG/DL (ref 7–20)
CALCIUM: 8.4 MG/DL (ref 8.4–10.2)
CHLORIDE SERPL-SCNC: 96 MMOL/L (ref 98–107)
CO2 SERPL-SCNC: 31 MMOL/L (ref 22–30)
ERYTHROCYTE [DISTWIDTH] IN BLOOD BY AUTOMATED COUNT: 15.8 % (ref 11.5–14)
GLUCOSE SERPL-MCNC: 94 MG/DL (ref 75–110)
HCT VFR BLD CALC: 38.3 % (ref 37.9–51)
HGB BLD-MCNC: 13.5 G/DL (ref 13.5–17)
MCH RBC QN AUTO: 31.8 PG (ref 27–33.4)
MCHC RBC AUTO-ENTMCNC: 35.1 G/DL (ref 32–36)
MCV RBC AUTO: 90 FL (ref 80–97)
PLATELET # BLD: 99 10^3/UL (ref 150–450)
POTASSIUM SERPL-SCNC: 3.2 MMOL/L (ref 3.6–5)
RBC # BLD AUTO: 4.24 10^6/UL (ref 4.35–5.55)
WBC # BLD AUTO: 5.1 10^3/UL (ref 4–10.5)

## 2020-01-03 RX ADMIN — LORAZEPAM PRN MG: 2 INJECTION INTRAMUSCULAR; INTRAVENOUS at 15:49

## 2020-01-03 RX ADMIN — LORAZEPAM PRN MG: 2 INJECTION INTRAMUSCULAR; INTRAVENOUS at 01:54

## 2020-01-03 RX ADMIN — Medication SCH: at 20:59

## 2020-01-03 RX ADMIN — SODIUM CHLORIDE, SODIUM LACTATE, POTASSIUM CHLORIDE, AND CALCIUM CHLORIDE PRN MLS/HR: .6; .31; .03; .02 INJECTION, SOLUTION INTRAVENOUS at 15:48

## 2020-01-03 RX ADMIN — HEPARIN SODIUM SCH UNIT: 5000 INJECTION, SOLUTION INTRAVENOUS; SUBCUTANEOUS at 21:02

## 2020-01-03 RX ADMIN — LABETALOL HYDROCHLORIDE PRN MG: 5 INJECTION, SOLUTION INTRAVENOUS at 23:14

## 2020-01-03 RX ADMIN — HEPARIN SODIUM SCH UNIT: 5000 INJECTION, SOLUTION INTRAVENOUS; SUBCUTANEOUS at 06:33

## 2020-01-03 RX ADMIN — Medication SCH: at 05:33

## 2020-01-03 RX ADMIN — LORAZEPAM PRN MG: 2 INJECTION INTRAMUSCULAR; INTRAVENOUS at 20:00

## 2020-01-03 RX ADMIN — ATENOLOL SCH MG: 50 TABLET ORAL at 09:24

## 2020-01-03 RX ADMIN — AMLODIPINE BESYLATE SCH MG: 10 TABLET ORAL at 09:24

## 2020-01-03 RX ADMIN — LORAZEPAM PRN MG: 2 INJECTION INTRAMUSCULAR; INTRAVENOUS at 08:48

## 2020-01-03 RX ADMIN — METOCLOPRAMIDE SCH MLS/HR: 5 INJECTION, SOLUTION INTRAMUSCULAR; INTRAVENOUS at 18:26

## 2020-01-03 RX ADMIN — DEXMEDETOMIDINE HYDROCHLORIDE PRN MLS/HR: 4 INJECTION, SOLUTION INTRAVENOUS at 06:33

## 2020-01-03 RX ADMIN — HEPARIN SODIUM SCH: 5000 INJECTION, SOLUTION INTRAVENOUS; SUBCUTANEOUS at 14:31

## 2020-01-03 RX ADMIN — Medication SCH: at 14:32

## 2020-01-03 RX ADMIN — HYDROCHLOROTHIAZIDE SCH MG: 25 TABLET ORAL at 09:25

## 2020-01-03 RX ADMIN — MULTIVITAMIN TABLET SCH TAB: TABLET at 09:24

## 2020-01-03 NOTE — PDOC CRITICAL CARE PROG REPORT
General


Date:: 01/03/20


ICU Day:: 4


Hospital Day:: 4


Resuscitation Status: Full Code


Events in the past 12 to 24 Hours:: 





Off violent restraints. Calmer. Still on ativan and precedex


Review of systems relevant to events:: 





Neuro, CV


Reason for ICU Addmission:: Possible need for high dose ativan and intubation





- Medications:


Medications reviewed and adjusted accordingly: Yes


Vasopressors:: 





None


Sedation:: 





Precedex.





Physical Exam


Vital Signs: 


                                        











Temp Pulse Resp BP Pulse Ox


 


 99.2 F   82   28 H  163/117 H  92 


 


 01/03/20 04:18  01/02/20 20:00  01/03/20 06:00  01/03/20 05:41  01/03/20 06:00








                                 Intake & Output











 01/02/20 01/03/20 01/04/20





 06:59 06:59 06:59


 


Intake Total 3465 2131.4 


 


Output Total 2500 1250 


 


Balance 965 881.4 


 


Weight 111.9 kg 112.1 kg 








                                  Weight/Height





Weight                           112.1 kg


Height                           5 ft 9 in








General appearance: PRESENT: no acute distress, obese


Exam: 





Sedated.


Head exam: PRESENT: atraumatic, normocephalic


Eye exam: PRESENT: conjunctiva pink, EOMI, PERRLA.  ABSENT: scleral icterus


Ear exam: PRESENT: normal external ear exam


Mouth exam: PRESENT: moist, tongue midline


Respiratory exam: PRESENT: clear to auscultation cesar, tachypnea.  ABSENT: rales,

rhonchi, wheezes


Cardiovascular exam: PRESENT: RRR.  ABSENT: diastolic murmur, rubs, systolic 

murmur


GI/Abdominal exam: PRESENT: normal bowel sounds, soft.  ABSENT: distended, 

guarding, mass, organolmegaly, rebound, tenderness


Rectal exam: PRESENT: deferred


Extremities exam: PRESENT: full ROM.  ABSENT: calf tenderness, clubbing, pedal 

edema


Musculoskeletal exam: PRESENT: normal inspection


Neurological exam: PRESENT: altered


Psychiatric exam: PRESENT: agitated


Skin exam: PRESENT: dry, intact, warm.  ABSENT: cyanosis, rash





Laboratory/Radiographs


Laboratory Results: 


                                        





                                 01/03/20 03:55 





                                 01/03/20 03:55 





                                        











  01/02/20 01/03/20 01/03/20





  20:55 03:55 03:55


 


WBC   5.1 


 


RBC   4.24 L 


 


Hgb   13.5 


 


Hct   38.3 


 


MCV   90 


 


MCH   31.8 


 


MCHC   35.1 


 


RDW   15.8 H 


 


Plt Count   99 L 


 


Sodium    138.7


 


Potassium  3.2 L   3.2 L


 


Chloride    96 L


 


Carbon Dioxide    31 H


 


Anion Gap    12


 


BUN    3 L


 


Creatinine    0.66


 


Est GFR ( Amer)    > 60


 


Glucose    94


 


Calcium    8.4


 


Magnesium    1.9











All labs, radiographs, diagnostic studies and EKGs were personally reviewed: Yes


In addition, reports of radiographic and diagnostic studies were read: Yes





Assessment and Plan





- Diagnosis


(1) Alcohol withdrawal


Qualifiers: 


   Complication of substance-induced condition: with unspecified complication   

Qualified Code(s): F10.239 - Alcohol dependence with withdrawal, unspecified   


Is this a current diagnosis for this admission?: Yes   


Plan: 


Still agitated. On precedex and will try and discontinue this today. Non violent

restraints.








(2) Hypokalemia


Is this a current diagnosis for this admission?: Yes   


Plan: 


He is still hypokalemic despite potassium loading and magnesium replacement. 

However he is not showing dysrythmias and has no cardiac hx. Will watch for now 

in view of peaked T-waves.








(3) Hypomagnesemia


Is this a current diagnosis for this admission?: Yes   


Plan: 


Resolved





Plan Summary: 





Lighten sedation by stopping precedex. If more awake allow to eat.





Critical Time


Critical Time (minutes): 35


Level of Care: ICU


Anticipated discharge: Home


Within: Other - Too soon to tell.


-: 


1.  The care of a critical patient is a dynamic process.  This note is a 

representative synopsis but static in nature.  The timeframe for treatments 

given in order is not necessarily the actual time these treatments may have been

done.





2.  This patient requires critical care secondary to ongoing requirements for 

therapy not offered or safe outside the critical care environment.  Transfer to 

a lower level of care will result in altered life or limb morbidity and 

mortality.





3.  Multidisciplinary rounds completed.





4.  ABCDE bundle addressed.

## 2020-01-04 VITALS — SYSTOLIC BLOOD PRESSURE: 120 MMHG | DIASTOLIC BLOOD PRESSURE: 92 MMHG

## 2020-01-04 LAB
ANION GAP SERPL CALC-SCNC: 8 MMOL/L (ref 5–19)
BUN SERPL-MCNC: 4 MG/DL (ref 7–20)
CALCIUM: 8.5 MG/DL (ref 8.4–10.2)
CHLORIDE SERPL-SCNC: 95 MMOL/L (ref 98–107)
CO2 SERPL-SCNC: 35 MMOL/L (ref 22–30)
ERYTHROCYTE [DISTWIDTH] IN BLOOD BY AUTOMATED COUNT: 15.5 % (ref 11.5–14)
GLUCOSE SERPL-MCNC: 89 MG/DL (ref 75–110)
HCT VFR BLD CALC: 38 % (ref 37.9–51)
HGB BLD-MCNC: 13.4 G/DL (ref 13.5–17)
MCH RBC QN AUTO: 31.9 PG (ref 27–33.4)
MCHC RBC AUTO-ENTMCNC: 35.2 G/DL (ref 32–36)
MCV RBC AUTO: 91 FL (ref 80–97)
PLATELET # BLD: 115 10^3/UL (ref 150–450)
POTASSIUM SERPL-SCNC: 2.8 MMOL/L (ref 3.6–5)
RBC # BLD AUTO: 4.2 10^6/UL (ref 4.35–5.55)
WBC # BLD AUTO: 5.5 10^3/UL (ref 4–10.5)

## 2020-01-04 RX ADMIN — POTASSIUM CHLORIDE SCH MLS/HR: 29.8 INJECTION, SOLUTION INTRAVENOUS at 06:29

## 2020-01-04 RX ADMIN — POTASSIUM CHLORIDE SCH MLS/HR: 29.8 INJECTION, SOLUTION INTRAVENOUS at 05:15

## 2020-01-04 RX ADMIN — Medication SCH: at 06:13

## 2020-01-04 RX ADMIN — HYDROCHLOROTHIAZIDE SCH: 25 TABLET ORAL at 09:50

## 2020-01-04 RX ADMIN — LORAZEPAM PRN MG: 2 INJECTION INTRAMUSCULAR; INTRAVENOUS at 00:08

## 2020-01-04 RX ADMIN — LORAZEPAM PRN MG: 2 INJECTION INTRAMUSCULAR; INTRAVENOUS at 02:34

## 2020-01-04 RX ADMIN — AMLODIPINE BESYLATE SCH: 10 TABLET ORAL at 09:50

## 2020-01-04 RX ADMIN — HEPARIN SODIUM SCH UNIT: 5000 INJECTION, SOLUTION INTRAVENOUS; SUBCUTANEOUS at 05:15

## 2020-01-04 RX ADMIN — MULTIVITAMIN TABLET SCH TAB: TABLET at 09:34

## 2020-01-04 RX ADMIN — ATENOLOL SCH MG: 50 TABLET ORAL at 09:33

## 2020-01-04 NOTE — PDOC CRITICAL CARE PROG REPORT
General


Date:: 01/04/20


ICU Day:: 5


Hospital Day:: 5


Resuscitation Status: Full Code


Events in the past 12 to 24 Hours:: 





Mental status has improved dramatically.


Review of systems relevant to events:: 





Neuro, CV.


Reason for ICU Addmission:: Possible need for high dose ativan and intubation





- Medications:


Medications reviewed and adjusted accordingly: Yes


Vasopressors:: 





None


Sedation:: 





None





Physical Exam


Vital Signs: 


                                        











Temp Pulse Resp BP Pulse Ox


 


 98.5 F   134 H  19   149/101 H  90 L


 


 01/04/20 08:00  01/04/20 08:00  01/04/20 08:00  01/04/20 08:00  01/04/20 08:00








                                 Intake & Output











 01/03/20 01/04/20 01/05/20





 06:59 06:59 06:59


 


Intake Total 2131.4 1216.2 


 


Output Total 1250 2825 


 


Balance 881.4 -1608.8 


 


Weight 112.1 kg 111.9 kg 








                                  Weight/Height





Weight                           111.9 kg


Height                           5 ft 9 in








General appearance: PRESENT: no acute distress, cooperative


Head exam: PRESENT: atraumatic, normocephalic


Eye exam: PRESENT: conjunctiva pink, EOMI, PERRLA.  ABSENT: scleral icterus


Ear exam: PRESENT: normal external ear exam


Mouth exam: PRESENT: moist, tongue midline


Respiratory exam: PRESENT: clear to auscultation cesar.  ABSENT: rales, rhonchi, 

wheezes


Cardiovascular exam: PRESENT: tachycardia


Pulses: PRESENT: normal dorsalis pedis pul


GI/Abdominal exam: PRESENT: normal bowel sounds, soft.  ABSENT: distended, 

guarding, mass, organolmegaly, rebound, tenderness


Rectal exam: PRESENT: deferred


Extremities exam: PRESENT: full ROM.  ABSENT: calf tenderness, clubbing, pedal 

edema


Neurological exam: PRESENT: alert, awake, oriented to person, oriented to place,

oriented to time, oriented to situation, CN II-XII grossly intact.  ABSENT: 

motor sensory deficit


Skin exam: PRESENT: dry, intact, warm.  ABSENT: cyanosis, rash





Laboratory/Radiographs


Laboratory Results: 


                                        





                                 01/04/20 03:50 





                                 01/04/20 03:50 





                                        











  01/04/20 01/04/20





  03:50 03:50


 


WBC  5.5 


 


RBC  4.20 L 


 


Hgb  13.4 L 


 


Hct  38.0 


 


MCV  91 


 


MCH  31.9 


 


MCHC  35.2 


 


RDW  15.5 H 


 


Plt Count  115 L 


 


Sodium   138.4


 


Potassium   2.8 L*


 


Chloride   95 L


 


Carbon Dioxide   35 H


 


Anion Gap   8


 


BUN   4 L


 


Creatinine   0.70


 


Est GFR (African Amer)   > 60


 


Glucose   89


 


Calcium   8.5











Impressions: 


Asymptomatic hypokalemia


All labs, radiographs, diagnostic studies and EKGs were personally reviewed: Yes


In addition, reports of radiographic and diagnostic studies were read: Yes





Assessment and Plan





- Diagnosis


(1) Alcohol withdrawal


Qualifiers: 


   Complication of substance-induced condition: with unspecified complication   

Qualified Code(s): F10.239 - Alcohol dependence with withdrawal, unspecified   


Is this a current diagnosis for this admission?: Yes   


Plan: 


Withdrawal syndrome shows marked improvement. Wants to go home. Still 

occassionally confused, tachycardic. Still needs some mental clearing, ativan 

and HR monitoring for another day or 2.








(2) Hypokalemia


Is this a current diagnosis for this admission?: Yes   


Plan: 


Started on replacements BID for the next 2 days, then check level.








(3) Hypomagnesemia


Is this a current diagnosis for this admission?: Yes   


Plan: 


Resolved.





Plan Summary: 





Transfer to tele  floor. Ambulate. Hope to get home in a day or 2. Out patient 

referral for ETOH rehab.





Critical Time


Critical Time (minutes): 30


Level of Care: TELE


Anticipated discharge: Home


Within: within 72 hours


-: 


1.  The care of a critical patient is a dynamic process.  This note is a 

representative synopsis but static in nature.  The timeframe for treatments 

given in order is not necessarily the actual time these treatments may have been

done.





2.  This patient requires critical care secondary to ongoing requirements for 

therapy not offered or safe outside the critical care environment.  Transfer to 

a lower level of care will result in altered life or limb morbidity and 

mortality.





3.  Multidisciplinary rounds completed.





4.  ABCDE bundle addressed.

## 2020-01-04 NOTE — PROGRESS NOTE
Provider Note


Provider Note: 





Pt is ready for discharge. However [facility] has refused because he 

received ativan once at 5;30 AM. They were not clear as to why this disqualifies

him both to me and his nurse Marylou. He will go home with his father and walk in 

tomorrow himself to [facility], where he has been before.

## 2020-01-04 NOTE — PDOC DISCHARGE SUMMARY
Impression





- Admit/DC Date/PCP


Admission Date/Primary Care Provider: 


  12/31/19 15:49





  VIDHYA SIMONS MD





Discharge Date: 01/04/20





- Discharge Diagnosis


(1) Alcohol withdrawal


Is this a current diagnosis for this admission?: Yes   





(2) Hypokalemia


Is this a current diagnosis for this admission?: Yes   





(3) Hypomagnesemia


Is this a current diagnosis for this admission?: Yes   





- Additional Information


Resuscitation Status: Full Code


Discharge Diet: Regular


Discharge Activity: Activity As Tolerated


Referrals: 


VIDHYA SIMONS MD [Primary Care Provider] - Follow up as needed


Home Medications: 








Amlodipine Besylate [Norvasc 10 mg Tablet] 10 mg PO DAILY 12/31/19 


Atenolol 100 mg PO DAILY 12/31/19 











History of Present Illiness


History of Present Illness: 


HALI HARRISON is a 49 year old male with a history of alcoholism. He states

a hx of PTSD and depression. If true this would give him a dual diagnosis and 

high risk for relapse which he has done. He was admitted to the ICU due to an 

anticipated need for 4 point restraints and possible intubation. He was never 

intubated and briefly in 4 points. He has recovered and is back to his baseline 

mental status. He wants to go to  [facility] for further rehab. His 

return to normalcy confirmed by his father who will transport to [facility].








Hospital Course


Hospital Course: 


As mentioned, radhaifly in restraints. He has not been taking his metoprolol 

therefore his HR has been around 100 with no symptoms. I anticipate this will 

come down in time. Alcohol WD likely played a role. His potassium is chronically

low at about 3.0 He has had both PO an IV replacement today. He has not required

ativan today.





Physical Exam


Vital Signs: 





                                        











Temp Pulse Resp BP Pulse Ox


 


 98.5 F   134 H  19   120/92 H  90 L


 


 01/04/20 08:00  01/04/20 08:00  01/04/20 08:00  01/04/20 09:24  01/04/20 08:00








                                 Intake & Output











 01/03/20 01/04/20 01/05/20





 06:59 06:59 06:59


 


Intake Total 2131.4 1216.2 


 


Output Total 1250 2825 


 


Balance 881.4 -1608.8 


 


Weight 112.1 kg 111.9 kg 











General appearance: PRESENT: no acute distress, well-developed, well-nourished


Head exam: PRESENT: atraumatic, normocephalic


Eye exam: PRESENT: conjunctiva pink, EOMI, PERRLA.  ABSENT: scleral icterus


Ear exam: PRESENT: normal external ear exam


Mouth exam: PRESENT: moist, tongue midline


Respiratory exam: PRESENT: clear to auscultation cesar.  ABSENT: rales, rhonchi, 

wheezes


Cardiovascular exam: PRESENT: RRR, tachycardia


Pulses: PRESENT: normal dorsalis pedis pul


Vascular exam: PRESENT: normal capillary refill


GI/Abdominal exam: PRESENT: normal bowel sounds, soft.  ABSENT: distended, 

guarding, mass, organolmegaly, rebound, tenderness


Rectal exam: PRESENT: deferred


Extremities exam: PRESENT: full ROM.  ABSENT: calf tenderness, clubbing, pedal 

edema


Musculoskeletal exam: PRESENT: normal inspection


Neurological exam: PRESENT: alert, awake, oriented to person, oriented to place,

oriented to time, oriented to situation, CN II-XII grossly intact.  ABSENT: 

motor sensory deficit


Skin exam: PRESENT: dry, intact, warm.  ABSENT: cyanosis, rash





Results


Laboratory Results: 





                                        











WBC  5.5 10^3/uL (4.0-10.5)   01/04/20  03:50    


 


RBC  4.20 10^6/uL (4.35-5.55)  L  01/04/20  03:50    


 


Hgb  13.4 g/dL (13.5-17.0)  L  01/04/20  03:50    


 


Hct  38.0 % (37.9-51.0)   01/04/20  03:50    


 


MCV  91 fl (80-97)   01/04/20  03:50    


 


MCH  31.9 pg (27.0-33.4)   01/04/20  03:50    


 


MCHC  35.2 g/dL (32.0-36.0)   01/04/20  03:50    


 


RDW  15.5 % (11.5-14.0)  H  01/04/20  03:50    


 


Plt Count  115 10^3/uL (150-450)  L  01/04/20  03:50    


 


Lymph % (Auto)  Not Reportable   12/31/19  13:00    


 


Mono % (Auto)  Not Reportable   12/31/19  13:00    


 


Eos % (Auto)  Not Reportable   12/31/19  13:00    


 


Baso % (Auto)  Not Reportable   12/31/19  13:00    


 


Absolute Neuts (auto)  Not Reportable   12/31/19  13:00    


 


Absolute Lymphs (auto)  Not Reportable   12/31/19  13:00    


 


Absolute Monos (auto)  Not Reportable   12/31/19  13:00    


 


Absolute Eos (auto)  Not Reportable   12/31/19  13:00    


 


Absolute Basos (auto)  Not Reportable   12/31/19  13:00    


 


Total Counted  100   12/31/19  13:00    


 


Seg Neutrophils %  Not Reportable   12/31/19  13:00    


 


Seg Neuts % (Manual)  77 % (42-78)   12/31/19  13:00    


 


Lymphocytes % (Manual)  18 % (13-45)   12/31/19  13:00    


 


Monocytes % (Manual)  5 % (3-13)   12/31/19  13:00    


 


Eosinophils % (Manual)  0 % (0-6)   12/31/19  13:00    


 


Basophils % (Manual)  0 % (0-2)   12/31/19  13:00    


 


Abs Neuts (Manual)  7.9 10^3/uL (1.7-8.2)   12/31/19  13:00    


 


Abs Lymphs (Manual)  1.8 10^3/uL (0.5-4.7)   12/31/19  13:00    


 


Abs Monocytes (Manual)  0.5 10^3/uL (0.1-1.4)   12/31/19  13:00    


 


Absolute Eos (Manual)  0.0 10^3/uL (0.0-0.6)   12/31/19  13:00    


 


Abs Basophils (Manual)  0.0 10^3/uL (0.0-0.2)   12/31/19  13:00    


 


Platelet Comment  DECREASED   12/31/19  13:00    


 


Anisocytosis  SLIGHT   12/31/19  13:00    


 


PT  12.9 SEC (11.4-15.4)   12/31/19  13:00    


 


INR  0.97   12/31/19  13:00    


 


APTT  25.0 SEC (23.5-35.8)   12/31/19  13:00    


 


Sodium  138.4 mmol/L (137-145)   01/04/20  03:50    


 


Potassium  2.8 mmol/L (3.6-5.0)  L*  01/04/20  03:50    


 


Chloride  95 mmol/L ()  L  01/04/20  03:50    


 


Carbon Dioxide  35 mmol/L (22-30)  H  01/04/20  03:50    


 


Anion Gap  8  (5-19)   01/04/20  03:50    


 


BUN  4 mg/dL (7-20)  L  01/04/20  03:50    


 


Creatinine  0.70 mg/dL (0.52-1.25)   01/04/20  03:50    


 


Est GFR ( Amer)  > 60  (>60)   01/04/20  03:50    


 


Est GFR (Non-Af Amer)  Cancelled   01/01/20  06:07    


 


Est GFR (MDRD) Non-Af  > 60  (>60)   01/04/20  03:50    


 


Glucose  89 mg/dL ()   01/04/20  03:50    


 


POC Glucose  115 mg/dL ()  H  01/01/20  05:38    


 


Calcium  8.5 mg/dL (8.4-10.2)   01/04/20  03:50    


 


Magnesium  1.9 mg/dL (1.6-2.3)   01/03/20  03:55    


 


Total Bilirubin  1.9 mg/dL (0.2-1.3)  H  12/31/19  13:00    


 


Direct Bilirubin  1.1 mg/dL (0.0-0.4)  H  12/31/19  13:00    


 


Neonat Total Bilirubin  Not Reportable   12/31/19  13:00    


 


Neonat Direct Bilirubin  Not Reportable   12/31/19  13:00    


 


Neonat Indirect Bili  Not Reportable   12/31/19  13:00    


 


AST  375 U/L (17-59)  H  12/31/19  13:00    


 


ALT  180 U/L (<50)   12/31/19  13:00    


 


Alkaline Phosphatase  145 U/L ()  H  12/31/19  13:00    


 


Total Protein  8.9 g/dL (6.3-8.2)  H  12/31/19  13:00    


 


Albumin  5.2 g/dL (3.5-5.0)  H  12/31/19  13:00    


 


Lipase  361.1 U/L ()  H  12/31/19  13:00    


 


EGFR   Cancelled   01/01/20  06:07    


 


Urine Color  YOSHI   12/31/19  16:00    


 


Urine Appearance  SLIGHTLY-CLOUDY   12/31/19  16:00    


 


Urine pH  6.0  (5.0-9.0)   12/31/19  16:00    


 


Ur Specific Gravity  1.022   12/31/19  16:00    


 


Urine Protein  >=500 mg/dL (NEGATIVE)  H  12/31/19  16:00    


 


Urine Glucose (UA)  50 mg/dL (NEGATIVE)  H  12/31/19  16:00    


 


Urine Ketones  20 mg/dL (NEGATIVE)  H  12/31/19  16:00    


 


Urine Blood  MODERATE  (NEGATIVE)  H  12/31/19  16:00    


 


Urine Nitrite  NEGATIVE  (NEGATIVE)   12/31/19  16:00    


 


Urine Bilirubin  NEGATIVE  (NEGATIVE)   12/31/19  16:00    


 


Urine Urobilinogen  4.0 mg/dL (<2.0)  H  12/31/19  16:00    


 


Ur Leukocyte Esterase  NEGATIVE  (NEGATIVE)   12/31/19  16:00    


 


Urine WBC (Auto)  1 /HPF  12/31/19  16:00    


 


Urine RBC (Auto)  0 /HPF  12/31/19  16:00    


 


U Hyaline Cast (Auto)  14 /LPF  12/31/19  16:00    


 


Urine Mucus (Auto)  RARE /LPF  12/31/19  16:00    


 


Urine Ascorbic Acid  NEGATIVE  (NEGATIVE)   12/31/19  16:00    


 


Salicylates  < 1.0 mg/dL (2.0-20.0)  L  12/31/19  13:00    


 


Urine Opiates Screen  NEGATIVE   12/31/19  16:00    


 


Urine Methadone Screen  NEGATIVE   12/31/19  16:00    


 


Acetaminophen  < 10 ug/mL (10-30)  L  12/31/19  13:00    


 


Ur Barbiturates Screen  NEGATIVE   12/31/19  16:00    


 


Ur Phencyclidine Scrn  NEGATIVE   12/31/19  16:00    


 


Ur Amphetamines Screen  NEGATIVE   12/31/19  16:00    


 


U Benzodiazepines Scrn  UNCONFIRMED POSITIVE   12/31/19  16:00    


 


Urine Cocaine Screen  NEGATIVE   12/31/19  16:00    


 


U Marijuana (THC) Screen  NEGATIVE   12/31/19  16:00    


 


Serum Alcohol  32 mg/dL (NONE DETECTED)   12/31/19  13:00    











EKG Comments: 





Mild sinus tachycardia


Impressions: 





Alcohol WD related tachycardia needing his beta blocker back





Plan


Health Concerns: 


Relapse of drinking


Plan of Treatment: 


To have his father take him directly to [facility].


Goals: 


Sobriety  long term


Critical Time: 30


Level of Care: MEDICAL





Stroke


Is this a Stroke Patient?: No





Acute Heart Failure





- **


Is this a Heart Failure Patient?: No

## 2020-02-18 ENCOUNTER — HOSPITAL ENCOUNTER (INPATIENT)
Dept: HOSPITAL 62 - ER | Age: 50
LOS: 15 days | Discharge: HOME HEALTH | DRG: 897 | End: 2020-03-04
Attending: HOSPITALIST | Admitting: INTERNAL MEDICINE
Payer: COMMERCIAL

## 2020-02-18 DIAGNOSIS — E72.4: ICD-10-CM

## 2020-02-18 DIAGNOSIS — Z78.1: ICD-10-CM

## 2020-02-18 DIAGNOSIS — E83.42: ICD-10-CM

## 2020-02-18 DIAGNOSIS — I10: ICD-10-CM

## 2020-02-18 DIAGNOSIS — B95.61: ICD-10-CM

## 2020-02-18 DIAGNOSIS — E83.39: ICD-10-CM

## 2020-02-18 DIAGNOSIS — J44.9: ICD-10-CM

## 2020-02-18 DIAGNOSIS — K76.0: ICD-10-CM

## 2020-02-18 DIAGNOSIS — R78.81: ICD-10-CM

## 2020-02-18 DIAGNOSIS — E83.51: ICD-10-CM

## 2020-02-18 DIAGNOSIS — K92.1: ICD-10-CM

## 2020-02-18 DIAGNOSIS — K70.10: ICD-10-CM

## 2020-02-18 DIAGNOSIS — F17.200: ICD-10-CM

## 2020-02-18 DIAGNOSIS — F10.231: Primary | ICD-10-CM

## 2020-02-18 DIAGNOSIS — R94.31: ICD-10-CM

## 2020-02-18 DIAGNOSIS — R74.0: ICD-10-CM

## 2020-02-18 DIAGNOSIS — Z71.41: ICD-10-CM

## 2020-02-18 DIAGNOSIS — E87.1: ICD-10-CM

## 2020-02-18 DIAGNOSIS — Z88.8: ICD-10-CM

## 2020-02-18 DIAGNOSIS — D69.6: ICD-10-CM

## 2020-02-18 DIAGNOSIS — N17.9: ICD-10-CM

## 2020-02-18 DIAGNOSIS — E87.6: ICD-10-CM

## 2020-02-18 LAB
ADD MANUAL DIFF: NO
ALBUMIN SERPL-MCNC: 4.3 G/DL (ref 3.5–5)
ALP SERPL-CCNC: 142 U/L (ref 38–126)
ANION GAP SERPL CALC-SCNC: 13 MMOL/L (ref 5–19)
ANION GAP SERPL CALC-SCNC: 23 MMOL/L (ref 5–19)
APAP SERPL-MCNC: < 10 UG/ML (ref 10–30)
APPEARANCE UR: (no result)
APTT PPP: (no result) S
AST SERPL-CCNC: 278 U/L (ref 17–59)
BARBITURATES UR QL SCN: NEGATIVE
BASOPHILS # BLD AUTO: 0.1 10^3/UL (ref 0–0.2)
BASOPHILS NFR BLD AUTO: 0.8 % (ref 0–2)
BILIRUB DIRECT SERPL-MCNC: 4.1 MG/DL (ref 0–0.4)
BILIRUB SERPL-MCNC: 5.6 MG/DL (ref 0.2–1.3)
BILIRUB UR QL STRIP: NEGATIVE
BUN SERPL-MCNC: 6 MG/DL (ref 7–20)
BUN SERPL-MCNC: 7 MG/DL (ref 7–20)
CALCIUM: 7.7 MG/DL (ref 8.4–10.2)
CALCIUM: 8.6 MG/DL (ref 8.4–10.2)
CHLORIDE SERPL-SCNC: 75 MMOL/L (ref 98–107)
CHLORIDE SERPL-SCNC: 88 MMOL/L (ref 98–107)
CK MB SERPL-MCNC: 3.32 NG/ML (ref ?–4.55)
CK SERPL-CCNC: 429 U/L (ref 55–170)
CO2 SERPL-SCNC: 28 MMOL/L (ref 22–30)
CO2 SERPL-SCNC: 29 MMOL/L (ref 22–30)
EOSINOPHIL # BLD AUTO: 0.1 10^3/UL (ref 0–0.6)
EOSINOPHIL NFR BLD AUTO: 0.7 % (ref 0–6)
ERYTHROCYTE [DISTWIDTH] IN BLOOD BY AUTOMATED COUNT: 15.3 % (ref 11.5–14)
ETHANOL SERPL-MCNC: < 10 MG/DL
GLUCOSE SERPL-MCNC: 109 MG/DL (ref 75–110)
GLUCOSE SERPL-MCNC: 78 MG/DL (ref 75–110)
GLUCOSE UR STRIP-MCNC: NEGATIVE MG/DL
HCT VFR BLD CALC: 39.1 % (ref 37.9–51)
HGB BLD-MCNC: 13.6 G/DL (ref 13.5–17)
INR PPP: 1.04
KETONES UR STRIP-MCNC: (no result) MG/DL
LYMPHOCYTES # BLD AUTO: 1.5 10^3/UL (ref 0.5–4.7)
LYMPHOCYTES NFR BLD AUTO: 19.6 % (ref 13–45)
MCH RBC QN AUTO: 33.8 PG (ref 27–33.4)
MCHC RBC AUTO-ENTMCNC: 34.8 G/DL (ref 32–36)
MCV RBC AUTO: 97 FL (ref 80–97)
METHADONE UR QL SCN: NEGATIVE
MONOCYTES # BLD AUTO: 1 10^3/UL (ref 0.1–1.4)
MONOCYTES NFR BLD AUTO: 12.5 % (ref 3–13)
NEUTROPHILS # BLD AUTO: 5.1 10^3/UL (ref 1.7–8.2)
NEUTS SEG NFR BLD AUTO: 66.4 % (ref 42–78)
NITRITE UR QL STRIP: NEGATIVE
PCP UR QL SCN: NEGATIVE
PH UR STRIP: 6 [PH] (ref 5–9)
PLATELET # BLD: 127 10^3/UL (ref 150–450)
POTASSIUM SERPL-SCNC: 2.9 MMOL/L (ref 3.6–5)
POTASSIUM SERPL-SCNC: 3.3 MMOL/L (ref 3.6–5)
PROT SERPL-MCNC: 7.6 G/DL (ref 6.3–8.2)
PROT UR STRIP-MCNC: 100 MG/DL
PROTHROMBIN TIME: 13.7 SEC (ref 11.4–15.4)
RBC # BLD AUTO: 4.03 10^6/UL (ref 4.35–5.55)
SALICYLATES SERPL-MCNC: < 1 MG/DL (ref 2–20)
SP GR UR STRIP: 1.01
TOTAL CELLS COUNTED % (AUTO): 100 %
TROPONIN I SERPL-MCNC: 0.04 NG/ML
URINE AMPHETAMINES SCREEN: NEGATIVE
URINE BENZODIAZEPINES SCREEN: (no result)
URINE COCAINE SCREEN: NEGATIVE
URINE MARIJUANA (THC) SCREEN: NEGATIVE
UROBILINOGEN UR-MCNC: 4 MG/DL (ref ?–2)
WBC # BLD AUTO: 7.7 10^3/UL (ref 4–10.5)

## 2020-02-18 PROCEDURE — 82330 ASSAY OF CALCIUM: CPT

## 2020-02-18 PROCEDURE — 87150 DNA/RNA AMPLIFIED PROBE: CPT

## 2020-02-18 PROCEDURE — 76705 ECHO EXAM OF ABDOMEN: CPT

## 2020-02-18 PROCEDURE — 80076 HEPATIC FUNCTION PANEL: CPT

## 2020-02-18 PROCEDURE — 83690 ASSAY OF LIPASE: CPT

## 2020-02-18 PROCEDURE — 99291 CRITICAL CARE FIRST HOUR: CPT

## 2020-02-18 PROCEDURE — S0028 INJECTION, FAMOTIDINE, 20 MG: HCPCS

## 2020-02-18 PROCEDURE — 96365 THER/PROPH/DIAG IV INF INIT: CPT

## 2020-02-18 PROCEDURE — 80307 DRUG TEST PRSMV CHEM ANLYZR: CPT

## 2020-02-18 PROCEDURE — 36415 COLL VENOUS BLD VENIPUNCTURE: CPT

## 2020-02-18 PROCEDURE — 87040 BLOOD CULTURE FOR BACTERIA: CPT

## 2020-02-18 PROCEDURE — 84100 ASSAY OF PHOSPHORUS: CPT

## 2020-02-18 PROCEDURE — 94660 CPAP INITIATION&MGMT: CPT

## 2020-02-18 PROCEDURE — S0032 INJECTION, NAFCILLIN SODIUM: HCPCS

## 2020-02-18 PROCEDURE — 87077 CULTURE AEROBIC IDENTIFY: CPT

## 2020-02-18 PROCEDURE — 80048 BASIC METABOLIC PNL TOTAL CA: CPT

## 2020-02-18 PROCEDURE — 82550 ASSAY OF CK (CPK): CPT

## 2020-02-18 PROCEDURE — 82962 GLUCOSE BLOOD TEST: CPT

## 2020-02-18 PROCEDURE — 87186 SC STD MICRODIL/AGAR DIL: CPT

## 2020-02-18 PROCEDURE — 96361 HYDRATE IV INFUSION ADD-ON: CPT

## 2020-02-18 PROCEDURE — 83735 ASSAY OF MAGNESIUM: CPT

## 2020-02-18 PROCEDURE — 84484 ASSAY OF TROPONIN QUANT: CPT

## 2020-02-18 PROCEDURE — 5A09557 ASSISTANCE WITH RESPIRATORY VENTILATION, GREATER THAN 96 CONSECUTIVE HOURS, CONTINUOUS POSITIVE AIRWAY PRESSURE: ICD-10-PCS | Performed by: HOSPITALIST

## 2020-02-18 PROCEDURE — 99231 SBSQ HOSP IP/OBS SF/LOW 25: CPT

## 2020-02-18 PROCEDURE — 84132 ASSAY OF SERUM POTASSIUM: CPT

## 2020-02-18 PROCEDURE — 93306 TTE W/DOPPLER COMPLETE: CPT

## 2020-02-18 PROCEDURE — 93005 ELECTROCARDIOGRAM TRACING: CPT

## 2020-02-18 PROCEDURE — 81001 URINALYSIS AUTO W/SCOPE: CPT

## 2020-02-18 PROCEDURE — 94640 AIRWAY INHALATION TREATMENT: CPT

## 2020-02-18 PROCEDURE — 80053 COMPREHEN METABOLIC PANEL: CPT

## 2020-02-18 PROCEDURE — 74176 CT ABD & PELVIS W/O CONTRAST: CPT

## 2020-02-18 PROCEDURE — 82272 OCCULT BLD FECES 1-3 TESTS: CPT

## 2020-02-18 PROCEDURE — 80074 ACUTE HEPATITIS PANEL: CPT

## 2020-02-18 PROCEDURE — 85025 COMPLETE CBC W/AUTO DIFF WBC: CPT

## 2020-02-18 PROCEDURE — 96368 THER/DIAG CONCURRENT INF: CPT

## 2020-02-18 PROCEDURE — 74018 RADEX ABDOMEN 1 VIEW: CPT

## 2020-02-18 PROCEDURE — 96376 TX/PRO/DX INJ SAME DRUG ADON: CPT

## 2020-02-18 PROCEDURE — 96375 TX/PRO/DX INJ NEW DRUG ADDON: CPT

## 2020-02-18 PROCEDURE — 82553 CREATINE MB FRACTION: CPT

## 2020-02-18 PROCEDURE — 82140 ASSAY OF AMMONIA: CPT

## 2020-02-18 PROCEDURE — 93010 ELECTROCARDIOGRAM REPORT: CPT

## 2020-02-18 PROCEDURE — 85610 PROTHROMBIN TIME: CPT

## 2020-02-18 RX ADMIN — SODIUM CHLORIDE PRN MLS/HR: 9 INJECTION, SOLUTION INTRAVENOUS at 14:07

## 2020-02-18 RX ADMIN — DIAZEPAM SCH MG: 5 TABLET ORAL at 09:04

## 2020-02-18 RX ADMIN — DIAZEPAM SCH MG: 5 TABLET ORAL at 13:13

## 2020-02-18 RX ADMIN — FAMOTIDINE SCH: 10 INJECTION INTRAVENOUS at 22:00

## 2020-02-18 RX ADMIN — METOCLOPRAMIDE SCH MLS/HR: 5 INJECTION, SOLUTION INTRAMUSCULAR; INTRAVENOUS at 08:56

## 2020-02-18 RX ADMIN — MAGNESIUM SULFATE IN DEXTROSE SCH MLS/HR: 10 INJECTION, SOLUTION INTRAVENOUS at 11:02

## 2020-02-18 RX ADMIN — LORAZEPAM PRN MG: 2 INJECTION INTRAMUSCULAR; INTRAVENOUS at 21:30

## 2020-02-18 RX ADMIN — LORAZEPAM PRN MG: 2 INJECTION INTRAMUSCULAR; INTRAVENOUS at 13:19

## 2020-02-18 RX ADMIN — HEPARIN SODIUM SCH: 5000 INJECTION, SOLUTION INTRAVENOUS; SUBCUTANEOUS at 21:19

## 2020-02-18 RX ADMIN — MAGNESIUM SULFATE IN DEXTROSE SCH MLS/HR: 10 INJECTION, SOLUTION INTRAVENOUS at 09:58

## 2020-02-18 RX ADMIN — IPRATROPIUM BROMIDE AND ALBUTEROL SULFATE SCH ML: 2.5; .5 SOLUTION RESPIRATORY (INHALATION) at 20:22

## 2020-02-18 RX ADMIN — SODIUM CHLORIDE PRN MLS/HR: 9 INJECTION, SOLUTION INTRAVENOUS at 03:15

## 2020-02-18 RX ADMIN — MAGNESIUM SULFATE IN DEXTROSE SCH MLS/HR: 10 INJECTION, SOLUTION INTRAVENOUS at 08:51

## 2020-02-18 RX ADMIN — POTASSIUM CHLORIDE SCH MLS/HR: 29.8 INJECTION, SOLUTION INTRAVENOUS at 21:28

## 2020-02-18 RX ADMIN — POTASSIUM CHLORIDE SCH MLS/HR: 29.8 INJECTION, SOLUTION INTRAVENOUS at 04:09

## 2020-02-18 RX ADMIN — SODIUM CHLORIDE PRN MLS/HR: 9 INJECTION, SOLUTION INTRAVENOUS at 18:38

## 2020-02-18 RX ADMIN — SODIUM CHLORIDE PRN MLS/HR: 9 INJECTION, SOLUTION INTRAVENOUS at 11:28

## 2020-02-18 RX ADMIN — POTASSIUM CHLORIDE SCH: 29.8 INJECTION, SOLUTION INTRAVENOUS at 09:44

## 2020-02-18 RX ADMIN — LORAZEPAM PRN MG: 2 INJECTION INTRAMUSCULAR; INTRAVENOUS at 11:34

## 2020-02-18 RX ADMIN — DIAZEPAM SCH: 5 TABLET ORAL at 22:40

## 2020-02-18 RX ADMIN — POTASSIUM CHLORIDE SCH MLS/HR: 29.8 INJECTION, SOLUTION INTRAVENOUS at 05:18

## 2020-02-18 RX ADMIN — SODIUM CHLORIDE PRN MLS/HR: 9 INJECTION, SOLUTION INTRAVENOUS at 04:04

## 2020-02-18 RX ADMIN — HEPARIN SODIUM SCH: 5000 INJECTION, SOLUTION INTRAVENOUS; SUBCUTANEOUS at 13:09

## 2020-02-18 RX ADMIN — MAGNESIUM SULFATE IN DEXTROSE SCH MLS/HR: 10 INJECTION, SOLUTION INTRAVENOUS at 05:23

## 2020-02-18 RX ADMIN — POTASSIUM CHLORIDE SCH MLS/HR: 29.8 INJECTION, SOLUTION INTRAVENOUS at 17:15

## 2020-02-18 RX ADMIN — FAMOTIDINE SCH MG: 10 INJECTION INTRAVENOUS at 09:03

## 2020-02-18 RX ADMIN — SERTRALINE HYDROCHLORIDE SCH: 50 TABLET ORAL at 22:01

## 2020-02-18 RX ADMIN — HEPARIN SODIUM SCH: 5000 INJECTION, SOLUTION INTRAVENOUS; SUBCUTANEOUS at 05:21

## 2020-02-18 RX ADMIN — POTASSIUM CHLORIDE SCH MLS/HR: 29.8 INJECTION, SOLUTION INTRAVENOUS at 09:01

## 2020-02-18 RX ADMIN — POTASSIUM CHLORIDE SCH MLS/HR: 29.8 INJECTION, SOLUTION INTRAVENOUS at 19:22

## 2020-02-18 RX ADMIN — LORAZEPAM PRN MG: 2 INJECTION INTRAMUSCULAR; INTRAVENOUS at 15:56

## 2020-02-18 RX ADMIN — IPRATROPIUM BROMIDE AND ALBUTEROL SULFATE SCH ML: 2.5; .5 SOLUTION RESPIRATORY (INHALATION) at 09:34

## 2020-02-18 NOTE — PDOC H&P
History of Present Illness


Admission Date/PCP: 


  02/18/20 05:10





  VIDHYA SIMONS MD





Patient complains of: Alcohol withdrawal


History of Present Illness: 


HALI HARRISON is a 49 year old male with a past medical history of 

polysubstance abuse, alcohol dependence and alcohol withdrawal DTs.  He presents

from [facility] for alcohol withdrawal manifesting and hypertension, 

tachycardia, tremor and hallucinations.  In the emergency department he is found

to have delirium labs reveal hyponatremia, hypokalemia, hypocalcemia 

hypomagnesemia, prolonged QT interval and acute renal failure.  He receives IV 

calcium, Ativan and referred to the hospitalist for admission.  Patient is a 

poor historian unable provide additional history.  Patient last admitted for 

alcohol withdrawal DTs December 31, 2019.








Past Medical History


Cardiac Medical History: Reports: Hypertension


Pulmonary Medical History: Reports: Chronic Obstructive Pulmonary Disease (COPD)


EENT Medical History: Reports: None


Neurological Medical History: Reports: Seizures


Endocrine Medical History: Reports: None


Renal/ Medical History: Reports: None


Malignancy Medical History: Reports: None


GI Medical History: Reports: None


Musculoskeltal Medical History: Reports: None


Skin Medical History: Reports: None


Psychiatric Medical History: Reports: Alcohol Dependency, Depression, Tobacco 

Dependency


Traumatic Medical History: Reports: None


Hematology: Reports: None


Infectious Medical History: Reports: None





Social History


Information Source: Emergency Med Personnel, [facility] Records


Smoking Status: Current Every Day Smoker


Electronic Cigarette use?: No


Frequency of Alcohol Use: Heavy


Amount of Alcoholic Beverages Per Day: 1/5/day


Hx Recreational Drug Use: Yes


Drugs: Cocaine, Marijuana, Hallucinogen, Other


Hx Prescription Drug Abuse: No





- Advance Directive


Resuscitation Status: Full Code





Family History


Family History: Reviewed & Not Pertinent


Parental Family History Reviewed: No - Unobtainable


Children Family History Reviewed: No - Unobtainable


Sibling(s) Family History Reviewed.: No - Unobtainable





Medication/Allergy


Home Medications: 








Amlodipine Besylate [Norvasc 10 mg Tablet] 10 mg PO DAILY 12/31/19 


Atenolol 100 mg PO DAILY 12/31/19 








Allergies/Adverse Reactions: 


                                        





lisinopril Adverse Reaction (Verified 12/31/19 17:53)


   











Review of Systems


ROS unobtainable: Due to mental status





Physical Exam


Vital Signs: 


                                        











Temp Pulse Resp BP Pulse Ox


 


 98.7 F   97   14   134/93 H  94 


 


 02/18/20 03:21  02/18/20 03:21  02/18/20 04:01  02/18/20 04:01  02/18/20 04:01








                                 Intake & Output











 02/16/20 02/17/20 02/18/20





 11:59 11:59 11:59


 


Intake Total   1966


 


Balance   1966


 


Weight   104.326 kg











General appearance: PRESENT: disheveled, severe distress, well-developed


Head exam: PRESENT: atraumatic, normocephalic


Eye exam: PRESENT: conjunctiva pink, EOMI, PERRLA.  ABSENT: scleral icterus


Ear exam: PRESENT: normal external ear exam


Mouth exam: PRESENT: moist, tongue midline


Neck exam: ABSENT: carotid bruit, JVD, lymphadenopathy, thyromegaly


Respiratory exam: PRESENT: prolonged expiratory phas, tachypnea.  ABSENT: rales,

rhonchi, wheezes


Cardiovascular exam: PRESENT: tachycardia.  ABSENT: diastolic murmur, rubs, 

systolic murmur


Pulses: PRESENT: normal dorsalis pedis pul


Vascular exam: PRESENT: normal capillary refill


GI/Abdominal exam: PRESENT: normal bowel sounds, soft.  ABSENT: distended, 

guarding, mass, organolmegaly, rebound, tenderness


Rectal exam: PRESENT: deferred


Extremities exam: PRESENT: full ROM.  ABSENT: calf tenderness, clubbing, pedal 

edema


Neurological exam: PRESENT: altered, CN II-XII grossly intact


Psychiatric exam: PRESENT: agitated, anxious, unusual affect


Skin exam: PRESENT: dry, intact, warm.  ABSENT: cyanosis, rash





Results


Laboratory Results: 


                                        





                                 02/18/20 02:30 





                                 02/18/20 02:30 





                                        











  02/18/20 02/18/20 02/18/20





  02:30 02:30 02:30


 


WBC  7.7  


 


RBC  4.03 L  


 


Hgb  13.6  


 


Hct  39.1  


 


MCV  97  


 


MCH  33.8 H  


 


MCHC  34.8  


 


RDW  15.3 H  


 


Plt Count  127 L  


 


Seg Neutrophils %  66.4  


 


Sodium   126.8 L 


 


Potassium   3.3 L 


 


Chloride   75 L 


 


Carbon Dioxide   29 


 


Anion Gap   23 H 


 


BUN   7 


 


Creatinine   1.98 H 


 


Est GFR ( Amer)   44 L 


 


Glucose   109 


 


Calcium   8.6 


 


Ionized Calcium Domi   


 


Phosphorus   


 


Magnesium    0.7 L*


 


Total Bilirubin   5.6 H 


 


AST   278 H 


 


Alkaline Phosphatase   142 H 


 


Ammonia   


 


Total Protein   7.6 


 


Albumin   4.3 


 


Lipase    612.5 H


 


Urine Color   


 


Urine Appearance   


 


Urine pH   


 


Ur Specific Gravity   


 


Urine Protein   


 


Urine Glucose (UA)   


 


Urine Ketones   


 


Urine Blood   


 


Urine Nitrite   


 


Ur Leukocyte Esterase   


 


Urine WBC (Auto)   


 


Urine RBC (Auto)   














  02/18/20 02/18/20 02/18/20





  02:30 03:00 03:00


 


WBC   


 


RBC   


 


Hgb   


 


Hct   


 


MCV   


 


MCH   


 


MCHC   


 


RDW   


 


Plt Count   


 


Seg Neutrophils %   


 


Sodium   


 


Potassium   


 


Chloride   


 


Carbon Dioxide   


 


Anion Gap   


 


BUN   


 


Creatinine   


 


Est GFR (African Amer)   


 


Glucose   


 


Calcium   


 


Ionized Calcium Domi    0.91 L


 


Phosphorus  1.0 L  


 


Magnesium   


 


Total Bilirubin   


 


AST   


 


Alkaline Phosphatase   


 


Ammonia   36.5 H 


 


Total Protein   


 


Albumin   


 


Lipase   


 


Urine Color   


 


Urine Appearance   


 


Urine pH   


 


Ur Specific Gravity   


 


Urine Protein   


 


Urine Glucose (UA)   


 


Urine Ketones   


 


Urine Blood   


 


Urine Nitrite   


 


Ur Leukocyte Esterase   


 


Urine WBC (Auto)   


 


Urine RBC (Auto)   














  02/18/20





  04:15


 


WBC 


 


RBC 


 


Hgb 


 


Hct 


 


MCV 


 


MCH 


 


MCHC 


 


RDW 


 


Plt Count 


 


Seg Neutrophils % 


 


Sodium 


 


Potassium 


 


Chloride 


 


Carbon Dioxide 


 


Anion Gap 


 


BUN 


 


Creatinine 


 


Est GFR (African Amer) 


 


Glucose 


 


Calcium 


 


Ionized Calcium Domi 


 


Phosphorus 


 


Magnesium 


 


Total Bilirubin 


 


AST 


 


Alkaline Phosphatase 


 


Ammonia 


 


Total Protein 


 


Albumin 


 


Lipase 


 


Urine Color  YOSHI


 


Urine Appearance  CLOUDY


 


Urine pH  6.0


 


Ur Specific Gravity  1.012


 


Urine Protein  100 H


 


Urine Glucose (UA)  NEGATIVE


 


Urine Ketones  TRACE H


 


Urine Blood  MODERATE H


 


Urine Nitrite  NEGATIVE


 


Ur Leukocyte Esterase  NEGATIVE


 


Urine WBC (Auto)  5


 


Urine RBC (Auto)  1








                                        











  02/18/20 02/18/20





  02:30 02:30


 


Creatine Kinase  429 H 


 


CK-MB (CK-2)   3.32


 


Troponin I   0.039














Assessment and Plan





- Diagnosis


(1) Hyponatremia


Is this a current diagnosis for this admission?: Yes   


Plan: 


Secondary to malnutrition, normal saline challenge, follow-up chemistry








(2) Acute renal failure


Qualifiers: 


   Acute renal failure type: unspecified   Qualified Code(s): N17.9 - Acute 

kidney failure, unspecified   


Is this a current diagnosis for this admission?: Yes   


Plan: 


Unclear cause, follow-up urinalysis and chemistry IV fluid challenge, avoid 

nephrotoxic meds and doses








(3) Alcohol withdrawal


Qualifiers: 


   Complication of substance-induced condition: with delirium   Qualified 

Code(s): F10.231 - Alcohol dependence with withdrawal delirium   


Is this a current diagnosis for this admission?: Yes   


Plan: 


Seizure precautions, thiamine, folate, Valium and Ativan PRN








(4) Alcoholic hepatitis


Qualifiers: 


   Ascites presence: without ascites   Qualified Code(s): K70.10 - Alcoholic 

hepatitis without ascites   


Is this a current diagnosis for this admission?: Yes   


Plan: 


Follow-up LFTs consider steroids








(5) Hypocalcemia


Is this a current diagnosis for this admission?: Yes   


Plan: 


Secondary to alcoholism, IV replacement, reevaluation of chemistry








(6) Hypokalemia


Is this a current diagnosis for this admission?: Yes   


Plan: 


Secondary to alcoholism, repletion and follow-up chemistry








(7) Hypomagnesemia


Is this a current diagnosis for this admission?: Yes   


Plan: 


Secondary to alcoholism, repletion and follow-up chemistry








- Time


Time Spent with patient: 25-34 minutes





- Inpatient Certification


Medical Necessity: Need Close Monitoring Due to Risk of Patient Decompensation

## 2020-02-18 NOTE — ER DOCUMENT REPORT
ED General





- General


Chief Complaint: Alcohol Withdrawl


Stated Complaint: ETOH


Time Seen by Provider: 02/18/20 02:35


Primary Care Provider: 


VIDHYA SIMONS MD [Primary Care Provider] - Follow up as needed


Notes: 





49-year-old male presents the emergency department sent over from [facility] for alcohol withdrawal that is worsening.  Patient was apparently having 

hallucinations at [facility].  Patient denies hallucinations.





Patient states that his whole body hurts, states he is having difficulty walking

because he has a tremor and feels like he has lost control of his muscles.  

Admits vomiting, denies abdominal pain.  States that his last drink was sometime

on Sunday around noon, states that he has been drinking approximately 1/5 of 

bourbon daily for the past 10 years.  Admits to history of withdrawal and states

this actually feels similar.  Denies any history of seizures with prior withdra

wal.


TRAVEL OUTSIDE OF THE [place] IN LAST 30 DAYS: No





- Related Data


Allergies/Adverse Reactions: 


                                        





lisinopril Adverse Reaction (Verified 12/31/19 17:53)


   








Home Medications: clonidine





Past Medical History





- General


Information source: Patient





- Social History


Smoking Status: Current Every Day Smoker


Chew tobacco use (# tins/day): No


Frequency of alcohol use: Heavy


Drug Abuse: None


Family History: Reviewed & Not Pertinent


Patient has suicidal ideation: No


Patient has homicidal ideation: No


Psychiatric Medical History: Reports: Hx Depression





Review of Systems





- Review of Systems


Constitutional: See HPI, Weakness


EENT: No symptoms reported


Cardiovascular: No symptoms reported


Respiratory: No symptoms reported


Gastrointestinal: See HPI, Vomiting


Musculoskeletal: See HPI, Muscle pain


Neurological/Psychological: See HPI, Hallucinations, Tremor


-: Yes All other systems reviewed and negative





Physical Exam





- Vital signs


Vitals: 


                                        











Temp Pulse Resp BP Pulse Ox


 


 97.9 F   101 H  28 H  100/57 L  97 


 


 02/18/20 01:06  02/18/20 01:06  02/18/20 01:06  02/18/20 01:06  02/18/20 01:06











Interpretation: Tachycardic, Tachypneic





- Notes


Notes: 





GENERAL: Sleeping, tremoring even in his sleep, wakes up easily, appears mildly 

uncomfortable.  


HEAD: Normocephalic, atraumatic


EYES: Pupils equal, round and reactive to light, extraocular movements intact.


ENT: Oral mucosa moist, tongue midline. 


NECK: Full range of motion, supple, trachea midline.


LUNGS: Clear to auscultation bilaterally, no wheezes, rales or rhonchi, no 

respiratory distress.


HEART: Regular rate and rhythm, no murmurs, gallops, rubs.  


ABDOMEN: Soft, nontender, nondistended, bowel sounds present in all 4 quadrants.

 


EXTREMITIES: Moves all 4 extremities spontaneously, no edema, radial and 

dorsalis pedis pulses 2/4 bilaterally.  No cyanosis.  


NEUROLOGICAL: Alert and oriented x3, normal speech, no facial droop, tremor 

persists while asleep, worsens when trying to sit up, no asterixis, biceps and 

patellar DTRs 2+ bilaterally.


PSYCH: Pleasant.


SKIN: Warm, Dry, normal turgor.  





Course





- Re-evaluation


Re-evalutation: 





02/18/20 02:50





02/18/20 04:41


After receiving Ativan 2mg IV he has started picking at the air and trying to 

catch bugs that are not there.  


02/18/20 04:47


Platelets are low at 127 otherwise CBC is normal, CMP shows no low sodium, newly

low potassium which is being repleted, BUN is normal, creatinine is increased, 

he is given normal saline through the IV, calcium is normal but ionized calcium 

is low, magnesium is also low, both of these are being repleted through the IV, 

ammonia is elevated at 36.5 as are the AST, ALT and alkaline phosphatase.  

Patient is not sleepy secondary to elevated ammonia.  Troponin is indeterminate 

but detectable at 0.039, lipase mildly elevated at 612.5, urinalysis does not 

show any signs of infection.  Alcohol level is undetectable.  EKG has prolonged 

QT likely related to the calcium and the magnesium being low.


02/18/20 04:47


Discussed case with Freddy Lee the PA in the ICU as well as Dr. Gonzalez the 

hospitalist, both feel the patient is appropriate for the IMCU.  Patient will be

admitted.





- Vital Signs


Vital signs: 


                                        











Temp Pulse Resp BP Pulse Ox


 


 98.7 F   97   14   134/93 H  94 


 


 02/18/20 03:21  02/18/20 03:21  02/18/20 04:01  02/18/20 04:01  02/18/20 04:01














- Laboratory


Result Diagrams: 


                                 02/18/20 02:30





                                 02/18/20 02:30


Laboratory results interpreted by me: 


                                        











  02/18/20 02/18/20 02/18/20





  02:30 02:30 02:30


 


RBC  4.03 L  


 


MCH  33.8 H  


 


RDW  15.3 H  


 


Plt Count  127 L  


 


Sodium   126.8 L 


 


Potassium   3.3 L 


 


Chloride   75 L 


 


Anion Gap   23 H 


 


Creatinine   1.98 H 


 


Est GFR ( Amer)   44 L 


 


Est GFR (MDRD) Non-Af   36 L 


 


Ionized Calcium Domi   


 


Magnesium    0.7 L*


 


Total Bilirubin   5.6 H 


 


Direct Bilirubin   4.1 H 


 


AST   278 H 


 


ALT   112 H 


 


Alkaline Phosphatase   142 H 


 


Ammonia   


 


Creatine Kinase    429 H


 


Lipase    612.5 H


 


Urine Protein   


 


Urine Ketones   


 


Urine Blood   


 


Urine Urobilinogen   


 


Salicylates   < 1.0 L 


 


Acetaminophen   < 10 L 














  02/18/20 02/18/20 02/18/20





  03:00 03:00 04:15


 


RBC   


 


MCH   


 


RDW   


 


Plt Count   


 


Sodium   


 


Potassium   


 


Chloride   


 


Anion Gap   


 


Creatinine   


 


Est GFR ( Amer)   


 


Est GFR (MDRD) Non-Af   


 


Ionized Calcium Domi   0.91 L 


 


Magnesium   


 


Total Bilirubin   


 


Direct Bilirubin   


 


AST   


 


ALT   


 


Alkaline Phosphatase   


 


Ammonia  36.5 H  


 


Creatine Kinase   


 


Lipase   


 


Urine Protein    100 H


 


Urine Ketones    TRACE H


 


Urine Blood    MODERATE H


 


Urine Urobilinogen    4.0 H


 


Salicylates   


 


Acetaminophen   














- EKG Interpretation by Me


Additional EKG results interpreted by me: 





02/18/20 04:47


EKG shows sinus rhythm at a rate of 91, prolonged QT interval, no ST segment 

elevations or depressions, rapid R wave progression, somewhat peaked T waves, T 

waves inverted in lead III per my interpretation.





Critical Care Note





- Critical Care Note


Total time excluding time spent on procedures (mins): 32





Discharge





- Discharge


Clinical Impression: 


 Hypokalemia, Hypomagnesemia, Hypocalcemia, Thrombocytopenia





Alcohol withdrawal


Qualifiers:


 Complication of substance-induced condition: with delirium Qualified Code(s): 

F10.231 - Alcohol dependence with withdrawal delirium





Alcoholic hepatitis


Qualifiers:


 Ascites presence: without ascites Qualified Code(s): K70.10 - Alcoholic 

hepatitis without ascites





Acute renal failure


Qualifiers:


 Acute renal failure type: unspecified Qualified Code(s): N17.9 - Acute kidney 

failure, unspecified





Condition: Fair


Disposition: ADMITTED AS INPATIENT


Admitting Provider: Carlos (Hospitalist)


Unit Admitted: IMCU


Referrals: 


VIDHYA SIMONS MD [Primary Care Provider] - Follow up as needed

## 2020-02-18 NOTE — EKG REPORT
SEVERITY:- ABNORMAL ECG -

SINUS RHYTHM

PROLONGED QT INTERVAL, CONSIDER HYPOCALCEMIA

:

Confirmed by: Neo Gallagher 18-Feb-2020 11:29:08

## 2020-02-18 NOTE — PROGRESS NOTE
Provider Note


Provider Note: 





Seen and evaluated patient.  Agree with prior recommendations from provider.  I 

have made some adjustments and place patient on every 2 hours CIWA protocol.  

Titrate lorazepam administration per protocol.  I have placed specific orders 

for Ativan to guide administration. Standing Valium 10 mg every 8 hours.  Will m

onitor patient closely as patient is having pretty significant alcohol 

withdrawal at this time with last CIWA of 18.  If he gets too far gone with 

persistently high CIWAs despite Ativan administration and standing Valium, will 

consider sending to ICU for Precedex drip.

## 2020-02-19 LAB
ABSOLUTE LYMPHOCYTES# (MANUAL): 1.2 10^3/UL (ref 0.5–4.7)
ABSOLUTE MONOCYTES # (MANUAL): 0.3 10^3/UL (ref 0.1–1.4)
ADD MANUAL DIFF: YES
ALBUMIN SERPL-MCNC: 3.3 G/DL (ref 3.5–5)
ALP SERPL-CCNC: 130 U/L (ref 38–126)
ANION GAP SERPL CALC-SCNC: 10 MMOL/L (ref 5–19)
ANION GAP SERPL CALC-SCNC: 8 MMOL/L (ref 5–19)
ANISOCYTOSIS BLD QL SMEAR: SLIGHT
AST SERPL-CCNC: 274 U/L (ref 17–59)
BASOPHILS NFR BLD MANUAL: 0 % (ref 0–2)
BILIRUB DIRECT SERPL-MCNC: 4.5 MG/DL (ref 0–0.4)
BILIRUB SERPL-MCNC: 5.3 MG/DL (ref 0.2–1.3)
BUN SERPL-MCNC: 5 MG/DL (ref 7–20)
BUN SERPL-MCNC: 5 MG/DL (ref 7–20)
CALCIUM: 7.6 MG/DL (ref 8.4–10.2)
CALCIUM: 7.9 MG/DL (ref 8.4–10.2)
CHLORIDE SERPL-SCNC: 99 MMOL/L (ref 98–107)
CHLORIDE SERPL-SCNC: 99 MMOL/L (ref 98–107)
CO2 SERPL-SCNC: 27 MMOL/L (ref 22–30)
CO2 SERPL-SCNC: 32 MMOL/L (ref 22–30)
DACRYOCYTES BLD QL SMEAR: SLIGHT
EOSINOPHIL NFR BLD MANUAL: 1 % (ref 0–6)
ERYTHROCYTE [DISTWIDTH] IN BLOOD BY AUTOMATED COUNT: 15.6 % (ref 11.5–14)
GLUCOSE SERPL-MCNC: 100 MG/DL (ref 75–110)
GLUCOSE SERPL-MCNC: 84 MG/DL (ref 75–110)
HCT VFR BLD CALC: 32.9 % (ref 37.9–51)
HGB BLD-MCNC: 11.4 G/DL (ref 13.5–17)
MACROCYTES BLD QL SMEAR: SLIGHT
MCH RBC QN AUTO: 34.5 PG (ref 27–33.4)
MCHC RBC AUTO-ENTMCNC: 34.7 G/DL (ref 32–36)
MCV RBC AUTO: 99 FL (ref 80–97)
MONOCYTES % (MANUAL): 6 % (ref 3–13)
NRBC BLD AUTO-RTO: 2 /100 WBC
PHOSPHATE SERPL-MCNC: 1 MG/DL (ref 2.5–4.5)
PLATELET # BLD: 103 10^3/UL (ref 150–450)
PLATELET COMMENT: (no result)
POLYCHROMASIA BLD QL SMEAR: SLIGHT
POTASSIUM SERPL-SCNC: 3.1 MMOL/L (ref 3.6–5)
POTASSIUM SERPL-SCNC: 3.6 MMOL/L (ref 3.6–5)
PROT SERPL-MCNC: 6.4 G/DL (ref 6.3–8.2)
RBC # BLD AUTO: 3.31 10^6/UL (ref 4.35–5.55)
SEGMENTED NEUTROPHILS % (MAN): 72 % (ref 42–78)
TOTAL CELLS COUNTED BLD: 100
VARIANT LYMPHS NFR BLD MANUAL: 21 % (ref 13–45)
WBC # BLD AUTO: 5.5 10^3/UL (ref 4–10.5)

## 2020-02-19 RX ADMIN — DIAZEPAM PRN MG: 5 INJECTION, SOLUTION INTRAMUSCULAR; INTRAVENOUS at 11:26

## 2020-02-19 RX ADMIN — HEPARIN SODIUM SCH: 5000 INJECTION, SOLUTION INTRAVENOUS; SUBCUTANEOUS at 05:17

## 2020-02-19 RX ADMIN — DIAZEPAM PRN MG: 5 INJECTION, SOLUTION INTRAMUSCULAR; INTRAVENOUS at 22:07

## 2020-02-19 RX ADMIN — HEPARIN SODIUM SCH: 5000 INJECTION, SOLUTION INTRAVENOUS; SUBCUTANEOUS at 13:01

## 2020-02-19 RX ADMIN — SERTRALINE HYDROCHLORIDE SCH MG: 50 TABLET ORAL at 22:07

## 2020-02-19 RX ADMIN — DIAZEPAM PRN MG: 5 INJECTION, SOLUTION INTRAMUSCULAR; INTRAVENOUS at 01:48

## 2020-02-19 RX ADMIN — DIAZEPAM PRN MG: 5 INJECTION, SOLUTION INTRAMUSCULAR; INTRAVENOUS at 12:26

## 2020-02-19 RX ADMIN — DIAZEPAM PRN MG: 5 INJECTION, SOLUTION INTRAMUSCULAR; INTRAVENOUS at 03:32

## 2020-02-19 RX ADMIN — METOCLOPRAMIDE SCH MLS/HR: 5 INJECTION, SOLUTION INTRAMUSCULAR; INTRAVENOUS at 08:28

## 2020-02-19 RX ADMIN — DIAZEPAM PRN MG: 5 INJECTION, SOLUTION INTRAMUSCULAR; INTRAVENOUS at 00:29

## 2020-02-19 RX ADMIN — DEXTROSE, SODIUM CHLORIDE, SODIUM LACTATE, POTASSIUM CHLORIDE, AND CALCIUM CHLORIDE PRN MLS/HR: 5; .6; .31; .03; .02 INJECTION, SOLUTION INTRAVENOUS at 08:20

## 2020-02-19 RX ADMIN — DEXTROSE, SODIUM CHLORIDE, SODIUM LACTATE, POTASSIUM CHLORIDE, AND CALCIUM CHLORIDE PRN MLS/HR: 5; .6; .31; .03; .02 INJECTION, SOLUTION INTRAVENOUS at 14:17

## 2020-02-19 RX ADMIN — FAMOTIDINE SCH MG: 10 INJECTION INTRAVENOUS at 09:17

## 2020-02-19 RX ADMIN — DIAZEPAM PRN MG: 5 INJECTION, SOLUTION INTRAMUSCULAR; INTRAVENOUS at 20:19

## 2020-02-19 RX ADMIN — DIAZEPAM PRN MG: 5 INJECTION, SOLUTION INTRAMUSCULAR; INTRAVENOUS at 23:27

## 2020-02-19 RX ADMIN — FAMOTIDINE SCH MG: 10 INJECTION INTRAVENOUS at 22:07

## 2020-02-19 RX ADMIN — HEPARIN SODIUM SCH: 5000 INJECTION, SOLUTION INTRAVENOUS; SUBCUTANEOUS at 22:07

## 2020-02-19 RX ADMIN — DIAZEPAM PRN MG: 5 INJECTION, SOLUTION INTRAMUSCULAR; INTRAVENOUS at 04:43

## 2020-02-19 RX ADMIN — DIAZEPAM PRN MG: 5 INJECTION, SOLUTION INTRAMUSCULAR; INTRAVENOUS at 18:13

## 2020-02-19 RX ADMIN — DEXTROSE, SODIUM CHLORIDE, SODIUM LACTATE, POTASSIUM CHLORIDE, AND CALCIUM CHLORIDE PRN MLS/HR: 5; .6; .31; .03; .02 INJECTION, SOLUTION INTRAVENOUS at 02:14

## 2020-02-19 NOTE — PROGRESS NOTE
Provider Note


Provider Note: 


Critical care note: 02/18/2020





Critical care start time:  23:44





Critical care issue: Agitation, unable to administer oral medications





Patient was seen at the request of his nurse who called to report that he was 

significantly agitated and they were unable to give him his oral medications at 

this time.  Patient has been allowing IV medications to be administered and the 

nursing staff is requested that his medications be converted to IV if possible. 

I went in and observed the patient in his room noting significant agitation and 

anxiety throughout my evaluation.  Patient was noted to have significant tremor 

of his extremities involving both proximal and distal muscle groups.  He did not

appear to be actively hallucinating but was noted to be easily distracted and to

have a poor attention span throughout my interview and evaluation.  Chest is 

clear to auscultation throughout all fields with ease of respiration noted 

though the patient is on O2 via nasal cannula at the time of my evaluation.  

Heart shows regular rate and rhythm without murmurs clicks gallops or rubs.  I 

discussed a treatment program utilizing IV medications for control of his 

anxiety, agitation and tremors.  Patient was attentive but I am not certain that

he understood fully the entire discussion.  Patient will be treated with IV 

Valium 5 mg every 4 hours and a PRN dose of IV Valium 10 mg every 1 hour as 

needed for severe anxiety or tremors.  Chlorpromazine 25 mg IV every 8 hours 

will be administered as needed for agitation or hallucinations.  Patient's 

metabolic profile will be rechecked after he finishes receiving his current K 

rider.  Patient's oral medications will be discontinued until such time as he 

can resume taking oral medications.  In the interim he will be treated with 

metoprolol 5 mg IV every 4 hours as needed for a systolic blood pressure greater

than 160 and/or a diastolic blood pressure greater than 100 and he may also be 

treated with the same medication for a heart rate greater than 110 on a 

sustained basis with a minimum systolic blood pressure of 110.





Critical care end time:  00:10   02/19/2020





Total critical care time: 17 minutes

## 2020-02-19 NOTE — PDOC PROGRESS REPORT
Subjective


Progress Note for:: 02/19/20


Subjective:: 





Patient is sedated.  He is currently sleeping under sedation.


Reason For Visit: 


ALCOHOL WITHDRAWAL DELERIUM








Physical Exam


Vital Signs: 


                                        











Temp Pulse Resp BP Pulse Ox


 


 98.9 F   83   22 H  107/61   95 


 


 02/19/20 03:20  02/19/20 06:46  02/19/20 03:20  02/19/20 03:20  02/19/20 03:20








                                 Intake & Output











 02/18/20 02/19/20 02/20/20





 06:59 06:59 06:59


 


Intake Total 1966 3744.2 1251.2


 


Output Total  1000 


 


Balance 1966 2744.2 1251.2


 


Weight 117.9 kg 119.6 kg 











General appearance: PRESENT: no acute distress, obese


Head exam: PRESENT: atraumatic


Neck exam: ABSENT: carotid bruit, JVD


Respiratory exam: PRESENT: clear to auscultation cesar, unlabored.  ABSENT: acces

sasha muscle use, tachypnea


Cardiovascular exam: PRESENT: RRR, +S1, +S2


GI/Abdominal exam: PRESENT: soft.  ABSENT: tenderness


Rectal exam: PRESENT: deferred


Extremities exam: ABSENT: calf tenderness


Neurological exam: PRESENT: other - Currently sedated, unable to fully evaluate


Psychiatric exam: PRESENT: other - Unable to fully evaluate





Results


Laboratory Results: 


                                        





                                 02/19/20 07:41 





                                 02/19/20 07:41 





                                        











  02/18/20 02/18/20 02/19/20





  10:59 15:10 01:57


 


WBC   


 


RBC   


 


Hgb   


 


Hct   


 


MCV   


 


MCH   


 


MCHC   


 


RDW   


 


Plt Count   


 


Seg Neutrophils %   


 


Sodium   129.4 L  136.1 L


 


Potassium   2.9 L*  3.6


 


Chloride   88 L  99


 


Carbon Dioxide   28  27


 


Anion Gap   13  10


 


BUN   6 L  5 L


 


Creatinine   1.43 H  1.13


 


Est GFR ( Amer)   > 60  > 60


 


Glucose   78  84


 


Calcium   7.7 L  7.6 L


 


Phosphorus   


 


Magnesium  2.0  D  


 


Total Bilirubin   


 


AST   


 


Alkaline Phosphatase   


 


Total Protein   


 


Albumin   














  02/19/20 02/19/20





  07:41 07:41


 


WBC  5.5 


 


RBC  3.31 L 


 


Hgb  11.4 L D 


 


Hct  32.9 L 


 


MCV  99 H 


 


MCH  34.5 H 


 


MCHC  34.7 


 


RDW  15.6 H 


 


Plt Count  103 L 


 


Seg Neutrophils %  Not Reportable 


 


Sodium   139.4


 


Potassium   3.1 L


 


Chloride   99


 


Carbon Dioxide   32 H


 


Anion Gap   8


 


BUN   5 L


 


Creatinine   1.04


 


Est GFR (African Amer)   > 60


 


Glucose   100


 


Calcium   7.9 L


 


Phosphorus   1.0 L


 


Magnesium   2.2


 


Total Bilirubin   5.3 H


 


AST   274 H


 


Alkaline Phosphatase   130 H


 


Total Protein   6.4


 


Albumin   3.3 L








                                        











  02/18/20 02/18/20





  02:30 02:30


 


Creatine Kinase  429 H 


 


CK-MB (CK-2)   3.32


 


Troponin I   0.039














Assessment and Plan





- Diagnosis


(1) Alcohol withdrawal


Qualifiers: 


   Complication of substance-induced condition: with delirium   Qualified 

Code(s): F10.231 - Alcohol dependence with withdrawal delirium   


Is this a current diagnosis for this admission?: Yes   


Plan: 


Continue with seizure precautions, thiamine, folate, Valium and Ativan PRN








(2) Alcoholic hepatitis


Qualifiers: 


   Ascites presence: without ascites   Qualified Code(s): K70.10 - Alcoholic 

hepatitis without ascites   


Is this a current diagnosis for this admission?: Yes   


Plan: 


Follow-up LFTs 








(3) Hypokalemia


Is this a current diagnosis for this admission?: Yes   


Plan: 


We will continue to replace








(4) Hypomagnesemia


Is this a current diagnosis for this admission?: Yes   


Plan: 


Corrected








(5) Hypophosphatemia


Is this a current diagnosis for this admission?: Yes   


Plan: 


Likely nutritional and secondary to his alcohol abuse








- Time


Time Spent with patient: 15-24 minutes





- Inpatient Certification


Based on my medical assessment, after consideration of the patient's 

comorbidities, presenting symptoms, or acuity I expect that the services needed 

warrant INPATIENT care.: Yes


Medical Necessity: Need Close Monitoring Due to Risk of Patient Decompensation

## 2020-02-20 LAB
ALBUMIN SERPL-MCNC: 3.1 G/DL (ref 3.5–5)
ALP SERPL-CCNC: 136 U/L (ref 38–126)
ANION GAP SERPL CALC-SCNC: 11 MMOL/L (ref 5–19)
AST SERPL-CCNC: 270 U/L (ref 17–59)
BILIRUB DIRECT SERPL-MCNC: 4.6 MG/DL (ref 0–0.4)
BILIRUB SERPL-MCNC: 5.4 MG/DL (ref 0.2–1.3)
BUN SERPL-MCNC: 2 MG/DL (ref 7–20)
CALCIUM: 7.3 MG/DL (ref 8.4–10.2)
CHLORIDE SERPL-SCNC: 101 MMOL/L (ref 98–107)
CO2 SERPL-SCNC: 28 MMOL/L (ref 22–30)
GLUCOSE SERPL-MCNC: 105 MG/DL (ref 75–110)
PHOSPHATE SERPL-MCNC: 1.4 MG/DL (ref 2.5–4.5)
POTASSIUM SERPL-SCNC: 3.2 MMOL/L (ref 3.6–5)
PROT SERPL-MCNC: 5.8 G/DL (ref 6.3–8.2)

## 2020-02-20 RX ADMIN — DIAZEPAM PRN MG: 5 INJECTION, SOLUTION INTRAMUSCULAR; INTRAVENOUS at 21:18

## 2020-02-20 RX ADMIN — SERTRALINE HYDROCHLORIDE SCH MG: 50 TABLET ORAL at 21:18

## 2020-02-20 RX ADMIN — FAMOTIDINE SCH MG: 10 INJECTION INTRAVENOUS at 21:18

## 2020-02-20 RX ADMIN — HEPARIN SODIUM SCH: 5000 INJECTION, SOLUTION INTRAVENOUS; SUBCUTANEOUS at 06:17

## 2020-02-20 RX ADMIN — FAMOTIDINE SCH MG: 10 INJECTION INTRAVENOUS at 09:03

## 2020-02-20 RX ADMIN — DIAZEPAM PRN MG: 5 INJECTION, SOLUTION INTRAMUSCULAR; INTRAVENOUS at 00:28

## 2020-02-20 RX ADMIN — HEPARIN SODIUM SCH: 5000 INJECTION, SOLUTION INTRAVENOUS; SUBCUTANEOUS at 13:01

## 2020-02-20 RX ADMIN — METOCLOPRAMIDE SCH MLS/HR: 5 INJECTION, SOLUTION INTRAMUSCULAR; INTRAVENOUS at 09:06

## 2020-02-20 RX ADMIN — DIAZEPAM PRN MG: 5 INJECTION, SOLUTION INTRAMUSCULAR; INTRAVENOUS at 07:34

## 2020-02-20 RX ADMIN — DIAZEPAM PRN MG: 5 INJECTION, SOLUTION INTRAMUSCULAR; INTRAVENOUS at 10:39

## 2020-02-20 RX ADMIN — CHLORPROMAZINE HYDROCHLORIDE PRN MG: 25 INJECTION INTRAMUSCULAR at 05:20

## 2020-02-20 RX ADMIN — DIAZEPAM PRN MG: 5 INJECTION, SOLUTION INTRAMUSCULAR; INTRAVENOUS at 03:51

## 2020-02-20 RX ADMIN — DEXTROSE, SODIUM CHLORIDE, SODIUM LACTATE, POTASSIUM CHLORIDE, AND CALCIUM CHLORIDE PRN MLS/HR: 5; .6; .31; .03; .02 INJECTION, SOLUTION INTRAVENOUS at 02:47

## 2020-02-20 RX ADMIN — HEPARIN SODIUM SCH: 5000 INJECTION, SOLUTION INTRAVENOUS; SUBCUTANEOUS at 21:09

## 2020-02-20 NOTE — PROGRESS NOTE
Provider Note


Provider Note: 


Critical care note: 02/19/2020





Critical care start time: 21:36





Critical care issue: Agitation and combative behavior





I was called by the patient's nurse to evaluate the patient due to his extreme 

agitation and combative behavior.  Patient has been very threatening to the 

nursing staff and has pulled his own Joseph catheter out with the balloon 

inflated and intact.  I observed the patient over an extended course of time and

found that he was extremely irritable and responded inappropriately to virtually

any physical contact or other stimulus.  The patient's Haldol was increased to 5

mg IV every 2 hours on an as-needed basis for extreme agitation and restraints 

were approved for use.





Critical care end time: 23:03





Total critical care time: 26 minutes

## 2020-02-20 NOTE — PDOC PROGRESS REPORT
Subjective


Progress Note for:: 02/20/20


Subjective:: 





Patient apparently had a rough night and although initially during the daytime 

he was [place] he became more aggressive as time went on today.  He received 1

dose of 10 mg of Geodon and so far this seems to be keeping him pretty quiet.


Reason For Visit: 


ALCOHOL WITHDRAWAL DELERIUM








Physical Exam


Vital Signs: 


                                        











Temp Pulse Resp BP Pulse Ox


 


 98.3 F   109 H  18   154/84 H  95 


 


 02/20/20 07:07  02/20/20 07:07  02/20/20 07:07  02/20/20 07:07  02/20/20 07:07








                                 Intake & Output











 02/19/20 02/20/20 02/21/20





 06:59 06:59 06:59


 


Intake Total 3744.2 4245.2 1105.2


 


Output Total 1000 1950 500


 


Balance 2744.2 2295.2 605.2


 


Weight 119.6 kg 119.3 kg 











General appearance: PRESENT: no acute distress, obese


Head exam: PRESENT: atraumatic


Respiratory exam: PRESENT: clear to auscultation cesar, unlabored


GI/Abdominal exam: PRESENT: firm


Rectal exam: PRESENT: deferred


Extremities exam: PRESENT: +1 edema


Neurological exam: PRESENT: awake, oriented to place


Psychiatric exam: PRESENT: agitated, other - restless





Results


Laboratory Results: 


                                        





                                 02/19/20 07:41 





                                 02/20/20 05:53 





                                        











  02/20/20





  05:53


 


Sodium  139.9


 


Potassium  3.2 L


 


Chloride  101


 


Carbon Dioxide  28


 


Anion Gap  11


 


BUN  2 L


 


Creatinine  0.70


 


Est GFR (African Amer)  > 60


 


Glucose  105


 


Calcium  7.3 L


 


Phosphorus  1.4 L


 


Total Bilirubin  5.4 H


 


AST  270 H


 


Alkaline Phosphatase  136 H


 


Total Protein  5.8 L


 


Albumin  3.1 L








                                        











  02/18/20 02/18/20





  02:30 02:30


 


Creatine Kinase  429 H 


 


CK-MB (CK-2)   3.32


 


Troponin I   0.039














Assessment and Plan





- Diagnosis


(1) Alcohol withdrawal


Qualifiers: 


   Complication of substance-induced condition: with delirium   Qualified 

Code(s): F10.231 - Alcohol dependence with withdrawal delirium   


Is this a current diagnosis for this admission?: Yes   


Plan: 


Continue with seizure precautions, thiamine, folate, Valium and Ativan PRN


Patient also received Geodon today which seems to work better than the other 

agents








(2) Alcoholic hepatitis


Qualifiers: 


   Ascites presence: without ascites   Qualified Code(s): K70.10 - Alcoholic 

hepatitis without ascites   


Is this a current diagnosis for this admission?: Yes   


Plan: 


Follow-up LFTs in am








(3) Hypokalemia


Is this a current diagnosis for this admission?: Yes   


Plan: 


We will continue to replace and recheck in am








(4) Hypomagnesemia


Is this a current diagnosis for this admission?: Yes   


Plan: 


Corrected








(5) Hypophosphatemia


Is this a current diagnosis for this admission?: Yes   


Plan: 


Likely nutritional and secondary to his alcohol abuse


Continue to replace

## 2020-02-21 LAB
ABSOLUTE LYMPHOCYTES# (MANUAL): 0.2 10^3/UL (ref 0.5–4.7)
ABSOLUTE LYMPHOCYTES# (MANUAL): 0.7 10^3/UL (ref 0.5–4.7)
ABSOLUTE MONOCYTES # (MANUAL): 0.2 10^3/UL (ref 0.1–1.4)
ABSOLUTE MONOCYTES # (MANUAL): 0.3 10^3/UL (ref 0.1–1.4)
ADD MANUAL DIFF: YES
ADD MANUAL DIFF: YES
ALBUMIN SERPL-MCNC: 3.7 G/DL (ref 3.5–5)
ALP SERPL-CCNC: 169 U/L (ref 38–126)
ANION GAP SERPL CALC-SCNC: 20 MMOL/L (ref 5–19)
ANISOCYTOSIS BLD QL SMEAR: (no result)
ANISOCYTOSIS BLD QL SMEAR: (no result)
APPEARANCE UR: CLEAR
APTT PPP: (no result) S
AST SERPL-CCNC: 306 U/L (ref 17–59)
BASOPHILS NFR BLD MANUAL: 0 % (ref 0–2)
BASOPHILS NFR BLD MANUAL: 1 % (ref 0–2)
BILIRUB DIRECT SERPL-MCNC: 7.3 MG/DL (ref 0–0.4)
BILIRUB SERPL-MCNC: 8.4 MG/DL (ref 0.2–1.3)
BILIRUB UR QL STRIP: (no result)
BUN SERPL-MCNC: 2 MG/DL (ref 7–20)
CALCIUM: 7.3 MG/DL (ref 8.4–10.2)
CHLORIDE SERPL-SCNC: 99 MMOL/L (ref 98–107)
CO2 SERPL-SCNC: 22 MMOL/L (ref 22–30)
DACRYOCYTES BLD QL SMEAR: SLIGHT
EOSINOPHIL NFR BLD MANUAL: 0 % (ref 0–6)
EOSINOPHIL NFR BLD MANUAL: 1 % (ref 0–6)
ERYTHROCYTE [DISTWIDTH] IN BLOOD BY AUTOMATED COUNT: 16.2 % (ref 11.5–14)
ERYTHROCYTE [DISTWIDTH] IN BLOOD BY AUTOMATED COUNT: 16.4 % (ref 11.5–14)
GLUCOSE SERPL-MCNC: 101 MG/DL (ref 75–110)
GLUCOSE UR STRIP-MCNC: NEGATIVE MG/DL
HCT VFR BLD CALC: 35.5 % (ref 37.9–51)
HCT VFR BLD CALC: 37.3 % (ref 37.9–51)
HGB BLD-MCNC: 12 G/DL (ref 13.5–17)
HGB BLD-MCNC: 12.2 G/DL (ref 13.5–17)
KETONES UR STRIP-MCNC: 20 MG/DL
MACROCYTES BLD QL SMEAR: (no result)
MACROCYTES BLD QL SMEAR: (no result)
MCH RBC QN AUTO: 33.3 PG (ref 27–33.4)
MCH RBC QN AUTO: 34.5 PG (ref 27–33.4)
MCHC RBC AUTO-ENTMCNC: 32.6 G/DL (ref 32–36)
MCHC RBC AUTO-ENTMCNC: 33.9 G/DL (ref 32–36)
MCV RBC AUTO: 102 FL (ref 80–97)
MCV RBC AUTO: 102 FL (ref 80–97)
MONOCYTES % (MANUAL): 2 % (ref 3–13)
MONOCYTES % (MANUAL): 5 % (ref 3–13)
NEUTS BAND NFR BLD MANUAL: 3 % (ref 3–5)
NEUTS BAND NFR BLD MANUAL: 4 % (ref 3–5)
NITRITE UR QL STRIP: NEGATIVE
NRBC BLD AUTO-RTO: 1 /100 WBC
PH UR STRIP: 7 [PH] (ref 5–9)
PHOSPHATE SERPL-MCNC: 3 MG/DL (ref 2.5–4.5)
PLATELET # BLD: 119 10^3/UL (ref 150–450)
PLATELET # BLD: 121 10^3/UL (ref 150–450)
PLATELET CLUMP BLD QL SMEAR: PRESENT
PLATELET COMMENT: (no result)
PLATELET COMMENT: (no result)
PLATELET LARGE: PRESENT
POLYCHROMASIA BLD QL SMEAR: (no result)
POLYCHROMASIA BLD QL SMEAR: SLIGHT
POTASSIUM SERPL-SCNC: 4.4 MMOL/L (ref 3.6–5)
PROT SERPL-MCNC: 7.1 G/DL (ref 6.3–8.2)
PROT UR STRIP-MCNC: 30 MG/DL
RBC # BLD AUTO: 3.49 10^6/UL (ref 4.35–5.55)
RBC # BLD AUTO: 3.65 10^6/UL (ref 4.35–5.55)
SEGMENTED NEUTROPHILS % (MAN): 83 % (ref 42–78)
SEGMENTED NEUTROPHILS % (MAN): 89 % (ref 42–78)
SP GR UR STRIP: 1.01
TARGETS BLD QL SMEAR: SLIGHT
TOTAL CELLS COUNTED BLD: 100
TOTAL CELLS COUNTED BLD: 100
UROBILINOGEN UR-MCNC: 4 MG/DL (ref ?–2)
VARIANT LYMPHS NFR BLD MANUAL: 3 % (ref 13–45)
VARIANT LYMPHS NFR BLD MANUAL: 9 % (ref 13–45)
WBC # BLD AUTO: 6.9 10^3/UL (ref 4–10.5)
WBC # BLD AUTO: 8 10^3/UL (ref 4–10.5)
WBC TOXIC VACUOLES BLD QL SMEAR: PRESENT
WBC TOXIC VACUOLES BLD QL SMEAR: PRESENT

## 2020-02-21 RX ADMIN — DEXMEDETOMIDINE HYDROCHLORIDE PRN MLS/HR: 4 INJECTION, SOLUTION INTRAVENOUS at 13:13

## 2020-02-21 RX ADMIN — DIAZEPAM PRN MG: 5 INJECTION, SOLUTION INTRAMUSCULAR; INTRAVENOUS at 05:57

## 2020-02-21 RX ADMIN — DEXTROSE, SODIUM CHLORIDE, SODIUM LACTATE, POTASSIUM CHLORIDE, AND CALCIUM CHLORIDE PRN MLS/HR: 5; .6; .31; .03; .02 INJECTION, SOLUTION INTRAVENOUS at 02:06

## 2020-02-21 RX ADMIN — HEPARIN SODIUM SCH: 5000 INJECTION, SOLUTION INTRAVENOUS; SUBCUTANEOUS at 06:03

## 2020-02-21 RX ADMIN — DEXMEDETOMIDINE HYDROCHLORIDE PRN MLS/HR: 4 INJECTION, SOLUTION INTRAVENOUS at 22:04

## 2020-02-21 RX ADMIN — DIAZEPAM PRN MG: 5 INJECTION, SOLUTION INTRAMUSCULAR; INTRAVENOUS at 03:26

## 2020-02-21 RX ADMIN — HEPARIN SODIUM SCH: 5000 INJECTION, SOLUTION INTRAVENOUS; SUBCUTANEOUS at 14:59

## 2020-02-21 RX ADMIN — DEXMEDETOMIDINE HYDROCHLORIDE PRN MLS/HR: 4 INJECTION, SOLUTION INTRAVENOUS at 18:54

## 2020-02-21 RX ADMIN — DIAZEPAM PRN MG: 5 INJECTION, SOLUTION INTRAMUSCULAR; INTRAVENOUS at 11:15

## 2020-02-21 RX ADMIN — SERTRALINE HYDROCHLORIDE SCH: 50 TABLET ORAL at 22:02

## 2020-02-21 RX ADMIN — FAMOTIDINE SCH MG: 10 INJECTION INTRAVENOUS at 10:00

## 2020-02-21 RX ADMIN — SODIUM CHLORIDE, SODIUM LACTATE, POTASSIUM CHLORIDE, AND CALCIUM CHLORIDE PRN MLS/HR: .6; .31; .03; .02 INJECTION, SOLUTION INTRAVENOUS at 18:54

## 2020-02-21 RX ADMIN — SODIUM CHLORIDE, SODIUM LACTATE, POTASSIUM CHLORIDE, AND CALCIUM CHLORIDE PRN MLS/HR: .6; .31; .03; .02 INJECTION, SOLUTION INTRAVENOUS at 11:44

## 2020-02-21 RX ADMIN — HEPARIN SODIUM SCH: 5000 INJECTION, SOLUTION INTRAVENOUS; SUBCUTANEOUS at 22:01

## 2020-02-21 RX ADMIN — SODIUM CHLORIDE, SODIUM LACTATE, POTASSIUM CHLORIDE, AND CALCIUM CHLORIDE PRN MLS/HR: .6; .31; .03; .02 INJECTION, SOLUTION INTRAVENOUS at 23:29

## 2020-02-21 RX ADMIN — DIAZEPAM PRN MG: 5 INJECTION, SOLUTION INTRAMUSCULAR; INTRAVENOUS at 06:40

## 2020-02-21 RX ADMIN — SODIUM CHLORIDE, SODIUM LACTATE, POTASSIUM CHLORIDE, AND CALCIUM CHLORIDE PRN MLS/HR: .6; .31; .03; .02 INJECTION, SOLUTION INTRAVENOUS at 08:34

## 2020-02-21 RX ADMIN — METOCLOPRAMIDE SCH MLS/HR: 5 INJECTION, SOLUTION INTRAMUSCULAR; INTRAVENOUS at 08:34

## 2020-02-21 RX ADMIN — DIAZEPAM PRN MG: 5 INJECTION, SOLUTION INTRAMUSCULAR; INTRAVENOUS at 22:01

## 2020-02-21 RX ADMIN — DEXMEDETOMIDINE HYDROCHLORIDE PRN MLS/HR: 4 INJECTION, SOLUTION INTRAVENOUS at 11:20

## 2020-02-21 RX ADMIN — DEXMEDETOMIDINE HYDROCHLORIDE PRN MLS/HR: 4 INJECTION, SOLUTION INTRAVENOUS at 15:24

## 2020-02-21 RX ADMIN — METOPROLOL TARTRATE PRN MG: 5 INJECTION, SOLUTION INTRAVENOUS at 11:10

## 2020-02-21 RX ADMIN — FAMOTIDINE SCH MG: 10 INJECTION INTRAVENOUS at 22:01

## 2020-02-21 RX ADMIN — CHLORPROMAZINE HYDROCHLORIDE PRN MG: 25 INJECTION INTRAMUSCULAR at 05:17

## 2020-02-21 RX ADMIN — SODIUM CHLORIDE, SODIUM LACTATE, POTASSIUM CHLORIDE, AND CALCIUM CHLORIDE PRN MLS/HR: .6; .31; .03; .02 INJECTION, SOLUTION INTRAVENOUS at 15:24

## 2020-02-21 NOTE — CRITICAL CARE ADMISSION REPORT
HPI


Date:: 02/21/20


Time:: 08:45


Reason for ICU Reason:: Worsening alcohol WD, tachycardia, relatively low BP


HPI: 





This patient is a 50 yo man admitted in alcohol WD. Treated on the floor with 

ativan and IVF. He has since become worse with more agitation, decompensated 

neuro status. Today he contiued but with more tachycardia to 150s, BP in 90s. He

has pulled his farooq catherter with some hematuria. He is somewhat hypocalcemic,

hypomagnesemic and febrile. There is a question of aspiration.





History obtained from:: Hospitalist and old records.





- Diagnosis/Plan


(1) Acute renal failure


Qualifiers: 


   Acute renal failure type: unspecified   Qualified Code(s): N17.9 - Acute 

kidney failure, unspecified   


Is this a current diagnosis for this admission?: Yes   


Plan: 


Resolved








(2) Alcohol withdrawal


Qualifiers: 


   Complication of substance-induced condition: with delirium   Qualified 

Code(s): F10.231 - Alcohol dependence with withdrawal delirium   


Is this a current diagnosis for this admission?: Yes   


Plan: 


He will need more ativan, PRN restraints. Will stop geodon as this has not been 

shown to help ETOH WD. IVF to improve BP and precedex and lopressor.








(3) Alcoholic hepatitis


Qualifiers: 


   Ascites presence: without ascites   Qualified Code(s): K70.10 - Alcoholic 

hepatitis without ascites   


Is this a current diagnosis for this admission?: Yes   


Plan: 


Not severe but LFTs are still rising. Check levels in AM.








(4) Hypocalcemia


Is this a current diagnosis for this admission?: Yes   


Plan: 


Check ionized and replace as needed.








(5) Hypokalemia


Is this a current diagnosis for this admission?: Yes   


Plan: 


Resolved for now.








(6) Hypomagnesemia


Is this a current diagnosis for this admission?: Yes   


Plan: 


Resolved for now. Recheck labs in AM.








(7) Hypophosphatemia


Is this a current diagnosis for this admission?: Yes   


Plan: 


Resolved for now. Recheck in AM.








- .


Plan Summary: 





IVF, replace electrolytes as needed. Restrain PRN. Lopressor and precedex PRN.





Past Medical History


Cardiac Medical History: Reports: Hypertension


Pulmonary Medical History: Reports: Chronic Obstructive Pulmonary Disease (COPD)


EENT Medical History: Reports: None


Neurological Medical History: Reports: Seizures


Endocrine Medical History: Reports: None


Renal/ Medical History: Reports: None


Malignancy Medical History: Reports: None


GI Medical History: Reports: None


Musculoskeltal Medical History: Reports: None


Skin Medical History: Reports: None


Psychiatric Medical History: Reports: Alcohol Dependency, Depression, Tobacco 

Dependency


Traumatic Medical History: Reports: None


Hematology: Reports: None


Infectious Medical History: Reports: None





Social/Family History





- Social History


Smoking Status: Current Every Day Smoker


Frequency of Alcohol Use: Heavy


Hx Recreational Drug Use: Yes


Drugs: Cocaine, Marijuana, Hallucinogen, Other


Hx Prescription Drug Abuse: No





- Medication/Allergies


Home Medications: 








Amlodipine Besylate [Norvasc 10 mg Tablet] 10 mg PO DAILY 12/31/19 


Atenolol 100 mg PO DAILY 12/31/19 


Albuterol Sulfate [Albuterol Sulfate Hfa] 2 puff IH Q4HP PRN 02/18/20 


Ondansetron [Zofran Odt 4 mg Tablet] 4 mg PO Q6HP PRN 02/18/20 


Propranolol HCl [Inderal 10 mg Tablet] 10 mg PO QHS 02/18/20 


Sertraline HCl [Zoloft 50 mg Tablet] 50 mg PO QHS 02/18/20 








Allergies/Adverse Reactions: 


                                        





lisinopril Adverse Reaction (Verified 12/31/19 17:53)


   











Review of Systems


ROS unobtainable: Due to mental status





Physical Exam


Vital Signs: 


                                        











Temp Pulse Resp BP Pulse Ox


 


 102.9 F H  63   22 H  133/90 H  97 


 


 02/21/20 08:54  02/21/20 08:54  02/21/20 08:54  02/21/20 08:54  02/21/20 08:54








                                 Intake & Output











 02/20/20 02/21/20 02/22/20





 06:59 06:59 06:59


 


Intake Total 4245.2 1105.2 


 


Output Total 1950 2200 


 


Balance 2295.2 -1094.8 


 


Weight 119.3 kg 117.2 kg 








                                  Weight/Height





Weight                           117.2 kg


Height                           5 ft 9 in








General appearance: PRESENT: disheveled, obese


Head exam: PRESENT: atraumatic, normocephalic


Eye exam: PRESENT: conjunctiva pink, EOMI, PERRLA.  ABSENT: scleral icterus


Ear exam: PRESENT: normal external ear exam


Mouth exam: PRESENT: dry mucosa


Respiratory exam: PRESENT: clear to auscultation cesar.  ABSENT: rales, rhonchi, 

wheezes


Cardiovascular exam: PRESENT: tachycardia


GI/Abdominal exam: PRESENT: normal bowel sounds, soft, other - Liver edge felt 4

 inches below costal margin..  ABSENT: distended, guarding, mass, organolmegaly,

rebound, tenderness


Rectal exam: PRESENT: deferred


Gentrourinary exam: PRESENT: indwelling catheter


Extremities exam: PRESENT: full ROM.  ABSENT: calf tenderness, clubbing, pedal 

edema


Musculoskeletal exam: PRESENT: normal inspection


Neurological exam: PRESENT: altered, other - Obtunded and encephalopathic.





Laboratory/Radiographs


Laboratory Results: 


                                        





                                 02/21/20 05:35 





                                 02/21/20 07:56 





                                        











  02/21/20 02/21/20 02/21/20





  00:20 00:48 05:35


 


WBC   8.0  6.9


 


RBC   3.65 L  3.49 L


 


Hgb   12.2 L  12.0 L


 


Hct   37.3 L  35.5 L


 


MCV   102 H  102 H


 


MCH   33.3  34.5 H


 


MCHC   32.6  33.9


 


RDW   16.2 H  16.4 H


 


Plt Count   119 L  121 L


 


Seg Neutrophils %   Not Reportable  Not Reportable


 


Sodium   


 


Potassium   


 


Chloride   


 


Carbon Dioxide   


 


Anion Gap   


 


BUN   


 


Creatinine   


 


Est GFR ( Amer)   


 


Est GFR (Non-Af Amer)   


 


Glucose   


 


Calcium   


 


Phosphorus   


 


Total Bilirubin   


 


AST   


 


Alkaline Phosphatase   


 


Total Protein   


 


Albumin   


 


Urine Color  YOSHI  


 


Urine Appearance  CLEAR  


 


Urine pH  7.0  


 


Ur Specific Gravity  1.013  


 


Urine Protein  30 H  


 


Urine Glucose (UA)  NEGATIVE  


 


Urine Ketones  20 H  


 


Urine Blood  SMALL H  


 


Urine Nitrite  NEGATIVE  


 


Ur Leukocyte Esterase  NEGATIVE  


 


Urine WBC (Auto)  2  


 


Urine RBC (Auto)  35  














  02/21/20 02/21/20





  05:35 07:56


 


WBC  


 


RBC  


 


Hgb  


 


Hct  


 


MCV  


 


MCH  


 


MCHC  


 


RDW  


 


Plt Count  


 


Seg Neutrophils %  


 


Sodium  Cancelled  140.5


 


Potassium  Cancelled  4.4


 


Chloride  Cancelled  99


 


Carbon Dioxide  Cancelled  22


 


Anion Gap  Cancelled  20 H


 


BUN  Cancelled  2 L


 


Creatinine  Cancelled  0.67


 


Est GFR ( Amer)  Cancelled  > 60


 


Est GFR (Non-Af Amer)  Cancelled 


 


Glucose  Cancelled  101


 


Calcium  Cancelled  7.3 L


 


Phosphorus  Cancelled  3.0


 


Total Bilirubin  Cancelled  8.4 H


 


AST  Cancelled  306 H


 


Alkaline Phosphatase  Cancelled  169 H


 


Total Protein  Cancelled  7.1


 


Albumin  Cancelled  3.7


 


Urine Color  


 


Urine Appearance  


 


Urine pH  


 


Ur Specific Gravity  


 


Urine Protein  


 


Urine Glucose (UA)  


 


Urine Ketones  


 


Urine Blood  


 


Urine Nitrite  


 


Ur Leukocyte Esterase  


 


Urine WBC (Auto)  


 


Urine RBC (Auto)  








                                        











  02/18/20 02/18/20





  02:30 02:30


 


Creatine Kinase  429 H 


 


CK-MB (CK-2)   3.32


 


Troponin I   0.039











Impressions: 


Alcohol WD with expected electrolyte and VS derangements.


All labs, radiographs, diagnostic studies and EKGs were personally reviewed: Yes


In addition, reports of radiographic and diagnostic studies were read: Yes





Critical Time


Critical Time (minutes): 40


-: 


The care of a critically ill patient is dynamic.  This note represents a static 

moment in the admission process. Orders and treatments may be given 

simultaneously and urgently, and time is not representative of the treatment 

process.





This patient requires Critical Care secondary to life threatening organ or limb 

dysfunction.  Without Critical Care services, the patient is at risk for 

increased mortality and morbidity.

## 2020-02-21 NOTE — PDOC PROGRESS REPORT
Subjective


Progress Note for:: 02/21/20


Subjective:: 





Patient was noted to be febrile sometime last night.  He was also found to be 

tachycardic.  His T-max was about 104.2.  He was in sinus tachycardia up to 

about 150.  Patient also become hypotensive.  I was called this morning to see 

him due to persistent tachycardia and hypotension.  Patient had received 

multiple doses of Valium about 15 mg since 5 AM and also received Geodon at 

about 4 AM.  Despite this he remains restless and agitated.  Patient is also 

noted to have some hematuria likely traumatic from him shearing his Joseph 

catheter the previous day.


Reason For Visit: 


ALCOHOL WITHDRAWAL DELERIUM








Physical Exam


Vital Signs: 


                                        











Temp Pulse Resp BP Pulse Ox


 


 102.9 F H  159 H  14   110/80   100 


 


 02/21/20 07:25  02/21/20 07:25  02/21/20 04:25  02/21/20 07:25  02/21/20 04:25








                                 Intake & Output











 02/20/20 02/21/20 02/22/20





 06:59 06:59 06:59


 


Intake Total 4245.2 1105.2 


 


Output Total 1950 2200 


 


Balance 2295.2 -1094.8 


 


Weight 119.3 kg 117.2 kg 











General appearance: PRESENT: disheveled, morbidly obese, other - restless


Head exam: PRESENT: atraumatic, normocephalic


Eye exam: PRESENT: EOMI, PERRLA.  ABSENT: scleral icterus


Mouth exam: PRESENT: dry mucosa, tongue midline


Neck exam: ABSENT: carotid bruit, JVD, lymphadenopathy, thyromegaly


Respiratory exam: PRESENT: clear to auscultation cesar, tachypnea.  ABSENT: rales,

rhonchi, wheezes


Cardiovascular exam: PRESENT: +S1, +S2, tachycardia.  ABSENT: diastolic murmur, 

rubs, systolic murmur


GI/Abdominal exam: PRESENT: normal bowel sounds, soft.  ABSENT: distended, 

guarding, mass, organolmegaly, rebound, tenderness


Rectal exam: PRESENT: deferred


Extremities exam: PRESENT: full ROM.  ABSENT: calf tenderness, clubbing, pedal 

edema


Neurological exam: PRESENT: altered, other - sedated but restless, unable to 

converse.  ABSENT: motor sensory deficit


Psychiatric exam: ABSENT: homicidal ideation, suicidal ideation


Skin exam: PRESENT: dry, warm.  ABSENT: cyanosis, rash





Results


Laboratory Results: 


                                        





                                 02/21/20 05:35 





                                        











  02/21/20 02/21/20 02/21/20





  00:20 00:48 05:35


 


WBC   8.0  6.9


 


RBC   3.65 L  3.49 L


 


Hgb   12.2 L  12.0 L


 


Hct   37.3 L  35.5 L


 


MCV   102 H  102 H


 


MCH   33.3  34.5 H


 


MCHC   32.6  33.9


 


RDW   16.2 H  16.4 H


 


Plt Count   119 L  121 L


 


Seg Neutrophils %   Not Reportable  Not Reportable


 


Sodium   


 


Potassium   


 


Chloride   


 


Carbon Dioxide   


 


Anion Gap   


 


BUN   


 


Creatinine   


 


Est GFR ( Amer)   


 


Est GFR (Non-Af Amer)   


 


Glucose   


 


Calcium   


 


Phosphorus   


 


Total Bilirubin   


 


AST   


 


Alkaline Phosphatase   


 


Total Protein   


 


Albumin   


 


Urine Color  YOSHI  


 


Urine Appearance  CLEAR  


 


Urine pH  7.0  


 


Ur Specific Gravity  1.013  


 


Urine Protein  30 H  


 


Urine Glucose (UA)  NEGATIVE  


 


Urine Ketones  20 H  


 


Urine Blood  SMALL H  


 


Urine Nitrite  NEGATIVE  


 


Ur Leukocyte Esterase  NEGATIVE  


 


Urine WBC (Auto)  2  


 


Urine RBC (Auto)  35  














  02/21/20





  05:35


 


WBC 


 


RBC 


 


Hgb 


 


Hct 


 


MCV 


 


MCH 


 


MCHC 


 


RDW 


 


Plt Count 


 


Seg Neutrophils % 


 


Sodium  Cancelled


 


Potassium  Cancelled


 


Chloride  Cancelled


 


Carbon Dioxide  Cancelled


 


Anion Gap  Cancelled


 


BUN  Cancelled


 


Creatinine  Cancelled


 


Est GFR ( Amer)  Cancelled


 


Est GFR (Non-Af Amer)  Cancelled


 


Glucose  Cancelled


 


Calcium  Cancelled


 


Phosphorus  Cancelled


 


Total Bilirubin  Cancelled


 


AST  Cancelled


 


Alkaline Phosphatase  Cancelled


 


Total Protein  Cancelled


 


Albumin  Cancelled


 


Urine Color 


 


Urine Appearance 


 


Urine pH 


 


Ur Specific Gravity 


 


Urine Protein 


 


Urine Glucose (UA) 


 


Urine Ketones 


 


Urine Blood 


 


Urine Nitrite 


 


Ur Leukocyte Esterase 


 


Urine WBC (Auto) 


 


Urine RBC (Auto) 








                                        











  02/18/20 02/18/20





  02:30 02:30


 


Creatine Kinase  429 H 


 


CK-MB (CK-2)   3.32


 


Troponin I   0.039














Assessment and Plan





- Diagnosis


(1) Alcohol withdrawal


Qualifiers: 


   Complication of substance-induced condition: with delirium   Qualified 

Code(s): F10.231 - Alcohol dependence with withdrawal delirium   


Is this a current diagnosis for this admission?: Yes   


Plan: 


Patient appears to be in full blown withdrawal.  He is tachycardic, tachypneic, 

febrile, hypotensive and more agitated despite being on benzodiazepines.  At 

this time I think patient will benefit from ICU transfer and being sedated may 

be hopefully on Precedex or other sedatives.  He needs more intensive care.  I 

did talk to the intensivist and he has come up to evaluate the patient and 

patient is to be transferred to the ICU








(2) Alcoholic hepatitis


Qualifiers: 


   Ascites presence: without ascites   Qualified Code(s): K70.10 - Alcoholic 

hepatitis without ascites   


Is this a current diagnosis for this admission?: Yes   


Plan: 


Secondary


 to alcohol abuse.  Continue to follow LFTs








(3) Hypokalemia


Is this a current diagnosis for this admission?: Yes   


Plan: 


We will replace








(4) Hypomagnesemia


Is this a current diagnosis for this admission?: Yes   


Plan: 


Corrected








(5) Hypophosphatemia


Is this a current diagnosis for this admission?: Yes   


Plan: 


Likely nutritional and secondary to his alcohol abuse


Continue to replace and monitor








- Time


Time Spent with patient: 35 or more minutes


Medications reviewed and adjusted accordingly: Yes


Disposition: 





Patient transferred to the intensive care unit for further management

## 2020-02-21 NOTE — EKG REPORT
SEVERITY:- ABNORMAL ECG -

SINUS TACHYCARDIA

PROBABLE POSTERIOR INFARCT

NONSPECIFIC T ABNORMALITIES, INFERIOR LEADS

:

Confirmed by: Neo Gallagher 21-Feb-2020 10:40:41

## 2020-02-21 NOTE — PROGRESS NOTE
Provider Note


Provider Note: 


Critical care note: 02/21/2020





Critical care onset time: 00:11





Critical care issue: Fever of 104.2 F





Patient was seen at the request of his nurse who noted him to be tachycardic on 

the cardiac monitor and upon checking his temperature found to have a fever of 

104.2 F orally.  Patient was seemingly comfortable and had no complaints.  Upon

my evaluation the patient he again was noted to have no complaints.  He was 

noted to be tachycardic with a regular rate and rhythm and no evidence of 

murmurs clicks gallops or rubs.  Chest was clear to auscultation throughout all 

fields and respirations were unlabored and equal bilaterally.  Abdomen soft with

bowel sounds present in all 4 quadrants.  Extremities feel no clubbing cyanosis 

or edema.  CBC and UA were unremarkable blood cultures are pending.  At this 

point with no clear etiology of the fever I am electing to treat it 

symptomatically but I will not empirically start antibiotics with no idea of the

etiology of the fever prior to doing so.  Patient will be observed on an ongoing

basis for any changes in his status.





Critical care end time: 03:30





Total critical care time: 18 minutes

## 2020-02-22 LAB
ALBUMIN SERPL-MCNC: 2.9 G/DL (ref 3.5–5)
ALP SERPL-CCNC: 129 U/L (ref 38–126)
ANION GAP SERPL CALC-SCNC: 12 MMOL/L (ref 5–19)
AST SERPL-CCNC: 458 U/L (ref 17–59)
BILIRUB DIRECT SERPL-MCNC: 8.6 MG/DL (ref 0–0.4)
BILIRUB SERPL-MCNC: 9.6 MG/DL (ref 0.2–1.3)
BUN SERPL-MCNC: 7 MG/DL (ref 7–20)
CALCIUM: 7 MG/DL (ref 8.4–10.2)
CHLORIDE SERPL-SCNC: 102 MMOL/L (ref 98–107)
CO2 SERPL-SCNC: 25 MMOL/L (ref 22–30)
GLUCOSE SERPL-MCNC: 127 MG/DL (ref 75–110)
PHOSPHATE SERPL-MCNC: 2.8 MG/DL (ref 2.5–4.5)
POTASSIUM SERPL-SCNC: 4.1 MMOL/L (ref 3.6–5)
PROT SERPL-MCNC: 5.6 G/DL (ref 6.3–8.2)

## 2020-02-22 RX ADMIN — MAGNESIUM SULFATE IN DEXTROSE SCH MLS/HR: 10 INJECTION, SOLUTION INTRAVENOUS at 15:33

## 2020-02-22 RX ADMIN — MAGNESIUM SULFATE IN DEXTROSE SCH: 10 INJECTION, SOLUTION INTRAVENOUS at 15:59

## 2020-02-22 RX ADMIN — DEXMEDETOMIDINE HYDROCHLORIDE PRN MLS/HR: 4 INJECTION, SOLUTION INTRAVENOUS at 02:05

## 2020-02-22 RX ADMIN — DEXMEDETOMIDINE HYDROCHLORIDE PRN MLS/HR: 4 INJECTION, SOLUTION INTRAVENOUS at 08:54

## 2020-02-22 RX ADMIN — FAMOTIDINE SCH MG: 10 INJECTION INTRAVENOUS at 09:26

## 2020-02-22 RX ADMIN — MAGNESIUM SULFATE IN DEXTROSE SCH MLS/HR: 10 INJECTION, SOLUTION INTRAVENOUS at 08:53

## 2020-02-22 RX ADMIN — MAGNESIUM SULFATE IN DEXTROSE SCH MLS/HR: 10 INJECTION, SOLUTION INTRAVENOUS at 14:25

## 2020-02-22 RX ADMIN — NAFCILLIN SODIUM SCH MLS/HR: 2 INJECTION, POWDER, FOR SOLUTION INTRAMUSCULAR; INTRAVENOUS at 21:26

## 2020-02-22 RX ADMIN — HEPARIN SODIUM SCH: 5000 INJECTION, SOLUTION INTRAVENOUS; SUBCUTANEOUS at 06:14

## 2020-02-22 RX ADMIN — DEXMEDETOMIDINE HYDROCHLORIDE PRN MLS/HR: 4 INJECTION, SOLUTION INTRAVENOUS at 05:14

## 2020-02-22 RX ADMIN — MAGNESIUM SULFATE IN DEXTROSE SCH MLS/HR: 10 INJECTION, SOLUTION INTRAVENOUS at 10:53

## 2020-02-22 RX ADMIN — NAFCILLIN SODIUM SCH MLS/HR: 2 INJECTION, POWDER, FOR SOLUTION INTRAMUSCULAR; INTRAVENOUS at 17:46

## 2020-02-22 RX ADMIN — SODIUM CHLORIDE, SODIUM LACTATE, POTASSIUM CHLORIDE, AND CALCIUM CHLORIDE PRN MLS/HR: .6; .31; .03; .02 INJECTION, SOLUTION INTRAVENOUS at 17:46

## 2020-02-22 RX ADMIN — METOPROLOL TARTRATE PRN MG: 5 INJECTION, SOLUTION INTRAVENOUS at 08:55

## 2020-02-22 RX ADMIN — DEXMEDETOMIDINE HYDROCHLORIDE PRN MLS/HR: 4 INJECTION, SOLUTION INTRAVENOUS at 10:52

## 2020-02-22 RX ADMIN — DIAZEPAM PRN MG: 5 INJECTION, SOLUTION INTRAMUSCULAR; INTRAVENOUS at 15:38

## 2020-02-22 RX ADMIN — NAFCILLIN SODIUM SCH MLS/HR: 2 INJECTION, POWDER, FOR SOLUTION INTRAMUSCULAR; INTRAVENOUS at 06:14

## 2020-02-22 RX ADMIN — NAFCILLIN SODIUM SCH MLS/HR: 2 INJECTION, POWDER, FOR SOLUTION INTRAMUSCULAR; INTRAVENOUS at 13:22

## 2020-02-22 RX ADMIN — METOCLOPRAMIDE SCH MLS/HR: 5 INJECTION, SOLUTION INTRAMUSCULAR; INTRAVENOUS at 08:54

## 2020-02-22 RX ADMIN — DEXMEDETOMIDINE HYDROCHLORIDE PRN MLS/HR: 4 INJECTION, SOLUTION INTRAVENOUS at 14:30

## 2020-02-22 RX ADMIN — FAMOTIDINE SCH MG: 10 INJECTION INTRAVENOUS at 21:27

## 2020-02-22 RX ADMIN — MAGNESIUM SULFATE IN DEXTROSE SCH MLS/HR: 10 INJECTION, SOLUTION INTRAVENOUS at 12:50

## 2020-02-22 RX ADMIN — SODIUM CHLORIDE, SODIUM LACTATE, POTASSIUM CHLORIDE, AND CALCIUM CHLORIDE PRN MLS/HR: .6; .31; .03; .02 INJECTION, SOLUTION INTRAVENOUS at 03:34

## 2020-02-22 RX ADMIN — METOPROLOL TARTRATE PRN MG: 5 INJECTION, SOLUTION INTRAVENOUS at 05:14

## 2020-02-22 RX ADMIN — HEPARIN SODIUM SCH UNIT: 5000 INJECTION, SOLUTION INTRAVENOUS; SUBCUTANEOUS at 13:21

## 2020-02-22 RX ADMIN — NAFCILLIN SODIUM SCH MLS/HR: 2 INJECTION, POWDER, FOR SOLUTION INTRAMUSCULAR; INTRAVENOUS at 02:42

## 2020-02-22 RX ADMIN — DEXMEDETOMIDINE HYDROCHLORIDE PRN MLS/HR: 4 INJECTION, SOLUTION INTRAVENOUS at 20:47

## 2020-02-22 RX ADMIN — MAGNESIUM SULFATE IN DEXTROSE SCH MLS/HR: 10 INJECTION, SOLUTION INTRAVENOUS at 13:24

## 2020-02-22 RX ADMIN — SERTRALINE HYDROCHLORIDE SCH: 50 TABLET ORAL at 21:27

## 2020-02-22 RX ADMIN — HEPARIN SODIUM SCH UNIT: 5000 INJECTION, SOLUTION INTRAVENOUS; SUBCUTANEOUS at 21:27

## 2020-02-22 RX ADMIN — DIAZEPAM PRN MG: 5 INJECTION, SOLUTION INTRAMUSCULAR; INTRAVENOUS at 02:04

## 2020-02-22 RX ADMIN — DIAZEPAM PRN MG: 5 INJECTION, SOLUTION INTRAMUSCULAR; INTRAVENOUS at 08:52

## 2020-02-22 RX ADMIN — MAGNESIUM SULFATE IN DEXTROSE SCH: 10 INJECTION, SOLUTION INTRAVENOUS at 15:33

## 2020-02-22 RX ADMIN — SODIUM CHLORIDE, SODIUM LACTATE, POTASSIUM CHLORIDE, AND CALCIUM CHLORIDE PRN MLS/HR: .6; .31; .03; .02 INJECTION, SOLUTION INTRAVENOUS at 08:54

## 2020-02-22 RX ADMIN — DIAZEPAM PRN MG: 5 INJECTION, SOLUTION INTRAMUSCULAR; INTRAVENOUS at 04:37

## 2020-02-22 RX ADMIN — SODIUM CHLORIDE, SODIUM LACTATE, POTASSIUM CHLORIDE, AND CALCIUM CHLORIDE PRN MLS/HR: .6; .31; .03; .02 INJECTION, SOLUTION INTRAVENOUS at 12:55

## 2020-02-22 RX ADMIN — NAFCILLIN SODIUM SCH MLS/HR: 2 INJECTION, POWDER, FOR SOLUTION INTRAMUSCULAR; INTRAVENOUS at 09:34

## 2020-02-22 RX ADMIN — DEXMEDETOMIDINE HYDROCHLORIDE PRN MLS/HR: 4 INJECTION, SOLUTION INTRAVENOUS at 17:45

## 2020-02-22 NOTE — PROGRESS NOTE
Provider Note


Provider Note: 


Critical care note: 02/22/2020





Critical care start time: 03:05





Critical care issue: Progressive hypoxia





I was informed by the patient's nurse that his oxygen saturation had been 

gradually worsening and he was unable to be off BiPAP even for 30 to 60 seconds 

to take oral medications or drink water due to his oxygen saturation falling 

into the low 70s.  His oxygen saturation had gradually diminished from the mid 

90s to the high 80s while on BiPAP using pressure settings of 12/6 and 50% FiO2.

 On exam the patient's chest showed coarse rales and rhonchi throughout all 

fields with decreased breath sounds at both bases and a mildly prolonged 

expiratory phase and a marked tachypnea in the 40's was noted.  Patient severely

showed labored breathing when the BiPAP was removed.  Heart showed a regular 

rate and rhythm without murmurs.  Abdomen is soft and nontender with bowel 

sounds present in all 4 quadrants.  Extremities show no cyanosis or edema.  The 

patient's pressure settings were increased to 16/8 and his O2 sat improved into 

the mid 90s and his respiratory rate decreased into the low 30s.  Patient was 

observed for an extended period and was noted to be confused and struggling 

against his soft restraints with increased tachypnea and decreased oxygen 

saturation noted during his struggles.  He was given Haldol 10 mg IV x1 and also

received Ativan 1 mg IV.  His agitation and overall behavior improved with more 

extended periods of rest and less struggling against his restraints following 

medication.  Patient was observed over several different evaluations over the 

next 2 hours and was found to have gradually worsening symptoms with his oxygen 

saturation again dropping down into the high 80s and his respiratory rate 

increasing into the mid 30s.  His BiPAP settings were adjusted to a pressure 

setting of 18/10 and his FiO2 was continued at 50%.  The intensive care midlevel

was notified as the patient's condition was gradually worsening.  We discussed 

the patient's symptoms and clinical findings, course of therapy and his 

laboratory and x-ray findings.  The intensivist service came to evaluate the 

patient based on our discussion and subsequently determined that they would move

the patient to the ICU as the need for intubation and mechanical ventilation was

highly likely.





Critical care end time: 06:55





Total critical care time: 69 minutes

## 2020-02-22 NOTE — PD[facility] CRITICAL CARE PROG REPORT
General


Date:: 02/22/20


ICU Day:: 4


Hospital Day:: 4


Resuscitation Status: Full Code


Events in the past 12 to 24 Hours:: 





More sedated and compliant with precedex.


Review of systems relevant to events:: 





Neurological respiratory, CV


Reason for ICU Addmission:: Worsening alcohol WD, tachycardia, relatively low BP





- Medications:


Medications reviewed and adjusted accordingly: Yes


Vasopressors:: 





None


Sedation:: 





Precedex, ativan.





Physical Exam


Vital Signs: 


                                        











Temp Pulse Resp BP Pulse Ox


 


 97.9 F   74   21 H  134/99 H  100 


 


 02/22/20 10:00  02/22/20 10:00  02/22/20 10:00  02/22/20 10:00  02/22/20 10:00








                                 Intake & Output











 02/21/20 02/22/20 02/23/20





 06:59 06:59 06:59


 


Intake Total 1105.2 5608.2 1551.2


 


Output Total 2200 1120 275


 


Balance -1094.8 4488.2 1276.2


 


Weight 117.2 kg 122.9 kg 








                                  Weight/Height





Weight                           122.9 kg


Height                           5 ft 9 in








General appearance: PRESENT: no acute distress, disheveled, obese


Head exam: PRESENT: atraumatic, normocephalic


Eye exam: PRESENT: conjunctiva pink, EOMI, PERRLA, scleral icterus


Ear exam: PRESENT: normal external ear exam


Mouth exam: PRESENT: moist, tongue midline


Respiratory exam: PRESENT: clear to auscultation cesar.  ABSENT: rales, rhonchi, 

wheezes


Cardiovascular exam: PRESENT: RRR.  ABSENT: diastolic murmur, rubs, systolic 

murmur


Vascular exam: PRESENT: normal capillary refill


GI/Abdominal exam: PRESENT: normal bowel sounds, soft, other - Liver edge felt 

about 4 inches below costal margin. Feels less rubbery..  ABSENT: distended, 

guarding, mass, organolmegaly, rebound, tenderness


Rectal exam: PRESENT: deferred


Gentrourinary exam: PRESENT: indwelling catheter


Extremities exam: PRESENT: full ROM.  ABSENT: calf tenderness, clubbing, pedal 

edema


Musculoskeletal exam: PRESENT: normal inspection


Neurological exam: PRESENT: altered, other - In DTs and sedated.


Skin exam: PRESENT: dry, intact, warm.  ABSENT: cyanosis, rash





Laboratory/Radiographs


Laboratory Results: 


                                        





                                 02/21/20 05:35 





                                 02/22/20 06:02 





                                        











  02/22/20 02/22/20





  06:02 06:02


 


Sodium  138.9 


 


Potassium  4.1 


 


Chloride  102 


 


Carbon Dioxide  25 


 


Anion Gap  12 


 


BUN  7 


 


Creatinine  0.54 


 


Est GFR (African Amer)  > 60 


 


Glucose  127 H 


 


Calcium  7.0 L* 


 


Ionized Calcium Domi   0.92 L


 


Phosphorus  2.8 


 


Magnesium  1.1 L* 


 


Total Bilirubin  9.6 H 


 


AST  458 H 


 


Alkaline Phosphatase  129 H 


 


Total Protein  5.6 L 


 


Albumin  2.9 L 








                                        





02/21/20 05:30   Blood   Blood Culture (PCR) - Final


                            Staphylococcus Aureus


02/21/20 00:48   Blood   Blood Culture (PCR) - Final


                            Staphylococcus Aureus





                                        











  02/18/20 02/18/20





  02:30 02:30


 


Creatine Kinase  429 H 


 


CK-MB (CK-2)   3.32


 


Troponin I   0.039











Impressions: 


Alcohol WD with associated electrolyte disturbances.


All labs, radiographs, diagnostic studies and EKGs were personally reviewed: Yes


In addition, reports of radiographic and diagnostic studies were read: Yes





Assessment and Plan





- Diagnosis


(1) Alcohol withdrawal


Qualifiers: 


   Complication of substance-induced condition: with delirium   Qualified 

Code(s): F10.231 - Alcohol dependence with withdrawal delirium   


Is this a current diagnosis for this admission?: Yes   


Plan: 


More stable on precedex with ativan. Will gradually wean off.








(2) Acute renal failure


Qualifiers: 


   Acute renal failure type: unspecified   Qualified Code(s): N17.9 - Acute 

kidney failure, unspecified   


Is this a current diagnosis for this admission?: Yes   


Plan: 


Resolved








(3) Alcoholic hepatitis


Qualifiers: 


   Ascites presence: without ascites   Qualified Code(s): K70.10 - Alcoholic 

hepatitis without ascites   


Is this a current diagnosis for this admission?: Yes   


Plan: 


LFTs still climbing but under 500.








(4) Hypocalcemia


Is this a current diagnosis for this admission?: Yes   


Plan: 


Resolved








(5) Hypokalemia


Is this a current diagnosis for this admission?: Yes   


Plan: 


Resolved








(6) Hypomagnesemia


Is this a current diagnosis for this admission?: Yes   


Plan: 


Replace.








(7) Hypophosphatemia


Is this a current diagnosis for this admission?: Yes   


Plan: 


Replace.





Plan Summary: 





Replace electrolytes and gradually wean precedex.





Critical Time


Critical Time (minutes): 355


Level of Care: ICU


Anticipated discharge: Other - Rehab for alcohol.


-: 


1.  The care of a critical patient is a dynamic process.  This note is a 

representative synopsis but static in nature.  The timeframe for treatments 

given in order is not necessarily the actual time these treatments may have been

done.





2.  This patient requires critical care secondary to ongoing requirements for 

therapy not offered or safe outside the critical care environment.  Transfer to 

a lower level of care will result in altered life or limb morbidity and 

mortality.





3.  Multidisciplinary rounds completed.





4.  ABCDE bundle addressed.

## 2020-02-23 LAB
ALBUMIN SERPL-MCNC: 2.4 G/DL (ref 3.5–5)
ALP SERPL-CCNC: 179 U/L (ref 38–126)
ANION GAP SERPL CALC-SCNC: 7 MMOL/L (ref 5–19)
AST SERPL-CCNC: 294 U/L (ref 17–59)
BILIRUB DIRECT SERPL-MCNC: 10.8 MG/DL (ref 0–0.4)
BILIRUB SERPL-MCNC: 12.1 MG/DL (ref 0.2–1.3)
BUN SERPL-MCNC: 7 MG/DL (ref 7–20)
CALCIUM: 7 MG/DL (ref 8.4–10.2)
CHLORIDE SERPL-SCNC: 101 MMOL/L (ref 98–107)
CO2 SERPL-SCNC: 28 MMOL/L (ref 22–30)
GLUCOSE SERPL-MCNC: 126 MG/DL (ref 75–110)
PHOSPHATE SERPL-MCNC: 2.4 MG/DL (ref 2.5–4.5)
POTASSIUM SERPL-SCNC: 3.8 MMOL/L (ref 3.6–5)
PROT SERPL-MCNC: 5.1 G/DL (ref 6.3–8.2)

## 2020-02-23 RX ADMIN — SODIUM CHLORIDE, SODIUM LACTATE, POTASSIUM CHLORIDE, AND CALCIUM CHLORIDE PRN MLS/HR: .6; .31; .03; .02 INJECTION, SOLUTION INTRAVENOUS at 16:50

## 2020-02-23 RX ADMIN — NAFCILLIN SODIUM SCH MLS/HR: 2 INJECTION, POWDER, FOR SOLUTION INTRAMUSCULAR; INTRAVENOUS at 01:46

## 2020-02-23 RX ADMIN — NAFCILLIN SODIUM SCH MLS/HR: 2 INJECTION, POWDER, FOR SOLUTION INTRAMUSCULAR; INTRAVENOUS at 14:39

## 2020-02-23 RX ADMIN — LACTULOSE SCH GM: 20 SOLUTION ORAL at 09:02

## 2020-02-23 RX ADMIN — FAMOTIDINE SCH MG: 20 TABLET, FILM COATED ORAL at 14:39

## 2020-02-23 RX ADMIN — DIAZEPAM PRN MG: 5 INJECTION, SOLUTION INTRAMUSCULAR; INTRAVENOUS at 00:17

## 2020-02-23 RX ADMIN — DEXMEDETOMIDINE HYDROCHLORIDE PRN MLS/HR: 4 INJECTION, SOLUTION INTRAVENOUS at 06:06

## 2020-02-23 RX ADMIN — DIBASIC SODIUM PHOSPHATE, MONOBASIC POTASSIUM PHOSPHATE AND MONOBASIC SODIUM PHOSPHATE SCH MG: 852; 155; 130 TABLET ORAL at 14:36

## 2020-02-23 RX ADMIN — CALCIUM GLUCONATE SCH MLS/HR: 20 INJECTION, SOLUTION INTRAVENOUS at 15:40

## 2020-02-23 RX ADMIN — FAMOTIDINE SCH MG: 10 INJECTION INTRAVENOUS at 09:02

## 2020-02-23 RX ADMIN — NAFCILLIN SODIUM SCH MLS/HR: 2 INJECTION, POWDER, FOR SOLUTION INTRAMUSCULAR; INTRAVENOUS at 09:02

## 2020-02-23 RX ADMIN — NAFCILLIN SODIUM SCH MLS/HR: 2 INJECTION, POWDER, FOR SOLUTION INTRAMUSCULAR; INTRAVENOUS at 17:34

## 2020-02-23 RX ADMIN — LACTULOSE SCH GM: 20 SOLUTION ORAL at 06:10

## 2020-02-23 RX ADMIN — DIAZEPAM PRN MG: 5 INJECTION, SOLUTION INTRAMUSCULAR; INTRAVENOUS at 03:35

## 2020-02-23 RX ADMIN — HEPARIN SODIUM SCH UNIT: 5000 INJECTION, SOLUTION INTRAVENOUS; SUBCUTANEOUS at 21:25

## 2020-02-23 RX ADMIN — NAFCILLIN SODIUM SCH MLS/HR: 2 INJECTION, POWDER, FOR SOLUTION INTRAMUSCULAR; INTRAVENOUS at 21:26

## 2020-02-23 RX ADMIN — DEXMEDETOMIDINE HYDROCHLORIDE PRN MLS/HR: 4 INJECTION, SOLUTION INTRAVENOUS at 03:32

## 2020-02-23 RX ADMIN — FAMOTIDINE SCH MG: 20 TABLET, FILM COATED ORAL at 21:25

## 2020-02-23 RX ADMIN — DIAZEPAM PRN MG: 5 INJECTION, SOLUTION INTRAMUSCULAR; INTRAVENOUS at 21:35

## 2020-02-23 RX ADMIN — DIBASIC SODIUM PHOSPHATE, MONOBASIC POTASSIUM PHOSPHATE AND MONOBASIC SODIUM PHOSPHATE SCH MG: 852; 155; 130 TABLET ORAL at 17:35

## 2020-02-23 RX ADMIN — CALCIUM GLUCONATE SCH: 20 INJECTION, SOLUTION INTRAVENOUS at 14:35

## 2020-02-23 RX ADMIN — SERTRALINE HYDROCHLORIDE SCH MG: 50 TABLET ORAL at 21:25

## 2020-02-23 RX ADMIN — DIAZEPAM PRN MG: 5 INJECTION, SOLUTION INTRAMUSCULAR; INTRAVENOUS at 17:37

## 2020-02-23 RX ADMIN — DEXMEDETOMIDINE HYDROCHLORIDE PRN MLS/HR: 4 INJECTION, SOLUTION INTRAVENOUS at 00:17

## 2020-02-23 RX ADMIN — DEXMEDETOMIDINE HYDROCHLORIDE PRN MLS/HR: 4 INJECTION, SOLUTION INTRAVENOUS at 09:01

## 2020-02-23 RX ADMIN — DIAZEPAM PRN MG: 5 INJECTION, SOLUTION INTRAMUSCULAR; INTRAVENOUS at 02:23

## 2020-02-23 RX ADMIN — DIAZEPAM PRN MG: 5 INJECTION, SOLUTION INTRAMUSCULAR; INTRAVENOUS at 09:03

## 2020-02-23 RX ADMIN — HEPARIN SODIUM SCH UNIT: 5000 INJECTION, SOLUTION INTRAVENOUS; SUBCUTANEOUS at 06:07

## 2020-02-23 RX ADMIN — NAFCILLIN SODIUM SCH MLS/HR: 2 INJECTION, POWDER, FOR SOLUTION INTRAMUSCULAR; INTRAVENOUS at 05:28

## 2020-02-23 RX ADMIN — DIAZEPAM PRN MG: 5 INJECTION, SOLUTION INTRAMUSCULAR; INTRAVENOUS at 17:34

## 2020-02-23 RX ADMIN — SODIUM CHLORIDE, SODIUM LACTATE, POTASSIUM CHLORIDE, AND CALCIUM CHLORIDE PRN MLS/HR: .6; .31; .03; .02 INJECTION, SOLUTION INTRAVENOUS at 05:28

## 2020-02-23 RX ADMIN — DIBASIC SODIUM PHOSPHATE, MONOBASIC POTASSIUM PHOSPHATE AND MONOBASIC SODIUM PHOSPHATE SCH MG: 852; 155; 130 TABLET ORAL at 06:09

## 2020-02-23 RX ADMIN — DIAZEPAM PRN MG: 5 INJECTION, SOLUTION INTRAMUSCULAR; INTRAVENOUS at 04:44

## 2020-02-23 RX ADMIN — CALCIUM GLUCONATE SCH MLS/HR: 20 INJECTION, SOLUTION INTRAVENOUS at 14:36

## 2020-02-23 RX ADMIN — HEPARIN SODIUM SCH UNIT: 5000 INJECTION, SOLUTION INTRAVENOUS; SUBCUTANEOUS at 14:37

## 2020-02-23 RX ADMIN — LACTULOSE SCH GM: 20 SOLUTION ORAL at 14:37

## 2020-02-23 RX ADMIN — METOCLOPRAMIDE SCH MLS/HR: 5 INJECTION, SOLUTION INTRAMUSCULAR; INTRAVENOUS at 08:50

## 2020-02-23 NOTE — [facility] CRITICAL CARE PROG REPORT
General


Date:: 02/23/20


ICU Day:: 2


Hospital Day:: 5


Resuscitation Status: Full Code


Events in the past 12 to 24 Hours:: 





Some agitation. Still jaundiced but LFTs beginning to improve.


Review of systems relevant to events:: 





Neurological, respiratory, GI.


Reason for ICU Addmission:: Worsening alcohol WD, tachycardia, agitation.





- Medications:


Medications reviewed and adjusted accordingly: Yes


Vasopressors:: 





None


Sedation:: 





Precedex





Physical Exam


Vital Signs: 


                                        











Temp Pulse Resp BP Pulse Ox


 


 98.5 F   66   11 L  126/84 H  94 


 


 02/23/20 08:00  02/23/20 08:00  02/23/20 08:00  02/23/20 08:00  02/23/20 08:17








                                 Intake & Output











 02/22/20 02/23/20 02/24/20





 06:59 06:59 06:59


 


Intake Total 5608.2 5890.2 529


 


Output Total 1120 1615 125


 


Balance 4488.2 4275.2 404


 


Weight 122.9 kg 127.9 kg 








                                  Weight/Height





Weight                           127.9 kg


Height                           5 ft 9 in








General appearance: PRESENT: no acute distress


Head exam: PRESENT: atraumatic, normocephalic


Eye exam: PRESENT: scleral icterus


Ear exam: PRESENT: normal external ear exam


Mouth exam: PRESENT: moist, tongue midline


Respiratory exam: PRESENT: clear to auscultation cesar, decreased breath sounds.  

ABSENT: rales, rhonchi, wheezes


Cardiovascular exam: PRESENT: RRR.  ABSENT: diastolic murmur, rubs, systolic 

murmur


Vascular exam: PRESENT: normal capillary refill


GI/Abdominal exam: PRESENT: normal bowel sounds, soft, other - Caput medusae on 

abd wall..  ABSENT: distended, guarding, mass, organolmegaly, rebound, 

tenderness


Gentrourinary exam: PRESENT: indwelling catheter


Extremities exam: PRESENT: full ROM.  ABSENT: calf tenderness, clubbing, pedal 

edema


Musculoskeletal exam: PRESENT: normal inspection


Neurological exam: PRESENT: altered


Psychiatric exam: PRESENT: agitated


Skin exam: PRESENT: jaundice





Laboratory/Radiographs


Laboratory Results: 


                                        





                                 02/21/20 05:35 





                                 02/23/20 02:20 





                                        











  02/23/20 02/23/20 02/23/20





  02:20 02:20 04:15


 


Sodium  136.1 L  


 


Potassium  3.8  


 


Chloride  101  


 


Carbon Dioxide  28  


 


Anion Gap  7  


 


BUN  7  


 


Creatinine  0.61  


 


Est GFR ( Amer)  > 60  


 


Glucose  126 H  


 


Calcium  7.0 L*  


 


Ionized Calcium Domi   0.95 L 


 


Phosphorus  2.4 L  


 


Magnesium  2.2  D  


 


Total Bilirubin  12.1 H D  


 


AST  294 H  


 


Alkaline Phosphatase  179 H  


 


Ammonia    45.9 H


 


Total Protein  5.1 L  


 


Albumin  2.4 L  








                                        





02/21/20 05:30   Blood   Blood Culture (PCR) - Final


                            Staphylococcus Aureus


02/21/20 00:48   Blood   Blood Culture (PCR) - Final


                            Staphylococcus Aureus





                                        











  02/18/20 02/18/20





  02:30 02:30


 


Creatine Kinase  429 H 


 


CK-MB (CK-2)   3.32


 


Troponin I   0.039











Impressions: 


                                        





KUB X-Ray  02/23/20 05:17


IMPRESSION: A nasogastric tube tip is seen overlying the right


upper abdomen, likely at the duodenum or pylorus.


 











All labs, radiographs, diagnostic studies and EKGs were personally reviewed: Yes


In addition, reports of radiographic and diagnostic studies were read: Yes





Assessment and Plan





- Diagnosis


(1) Alcohol withdrawal


Qualifiers: 


   Complication of substance-induced condition: with delirium   Qualified 

Code(s): F10.231 - Alcohol dependence with withdrawal delirium   


Is this a current diagnosis for this admission?: Yes   


Plan: 


Will stop precedex and assess with only ativan.








(2) Acute renal failure


Qualifiers: 


   Acute renal failure type: unspecified   Qualified Code(s): N17.9 - Acute 

kidney failure, unspecified   


Is this a current diagnosis for this admission?: Yes   


Plan: 


Resolved.








(3) Alcoholic hepatitis


Qualifiers: 


   Ascites presence: without ascites   Qualified Code(s): K70.10 - Alcoholic 

hepatitis without ascites   


Is this a current diagnosis for this admission?: Yes   


Plan: 


Improving. Bilirubin will lag behind some.








(4) Hypocalcemia


Is this a current diagnosis for this admission?: Yes   


Plan: 


Still low ionized replace with IV calcium.








(5) Hypokalemia


Is this a current diagnosis for this admission?: Yes   


Plan: 


Resolved








(6) Hypomagnesemia


Is this a current diagnosis for this admission?: Yes   


Plan: 


Resolved








(7) Hypophosphatemia


Is this a current diagnosis for this admission?: Yes   


Plan: 


Resolved








(8) Hyperammonemia


Is this a current diagnosis for this admission?: Yes   


Plan: 


Up slightly at 45. Start lactuose





Plan Summary: 





Starting lactulose and stopping precedex.





Critical Time


Critical Time (minutes): 40


Level of Care: ICU


Anticipated discharge: Acute Rehab


Within: Other - Too soon to tell.


-: 


1.  The care of a critical patient is a dynamic process.  This note is a 

representative synopsis but static in nature.  The timeframe for treatments 

given in order is not necessarily the actual time these treatments may have been

done.





2.  This patient requires critical care secondary to ongoing requirements for 

therapy not offered or safe outside the critical care environment.  Transfer to 

a lower level of care will result in altered life or limb morbidity and 

mortality.





3.  Multidisciplinary rounds completed.





4.  ABCDE bundle addressed.

## 2020-02-23 NOTE — RADIOLOGY REPORT (SQ)
ABDOMINAL RADIOGRAPH:  2/23/2020 5:56 AM CST      

                                    

COMPARISON: None available



TECHNIQUE: A single radiograph of the abdomen was obtained.  

                                     

HISTORY: 49-year old with nasogastric tube placement.            

              

                        

FINDINGS: Only the upper abdomen was included on this

examination.  The visualized bowel gas pattern is nonspecific and

nonobstructive. No abnormal intra-abdominal calcifications are

seen. There are no findings to suggest organomegaly. A

nasogastric tube tip is seen overlying the right upper abdomen,

likely at the duodenum or pylorus. There is artifact seen over

the imaging which mildly limits evaluation. There is elevation of

the right hemidiaphragm.

                                                                 

                

IMPRESSION: A nasogastric tube tip is seen overlying the right

upper abdomen, likely at the duodenum or pylorus.

## 2020-02-24 LAB
ABSOLUTE LYMPHOCYTES# (MANUAL): 0.9 10^3/UL (ref 0.5–4.7)
ABSOLUTE MONOCYTES # (MANUAL): 0.9 10^3/UL (ref 0.1–1.4)
ADD MANUAL DIFF: YES
ALBUMIN SERPL-MCNC: 2.8 G/DL (ref 3.5–5)
ALP SERPL-CCNC: 260 U/L (ref 38–126)
ANION GAP SERPL CALC-SCNC: 10 MMOL/L (ref 5–19)
ANISOCYTOSIS BLD QL SMEAR: (no result)
AST SERPL-CCNC: 215 U/L (ref 17–59)
BASOPHILS NFR BLD MANUAL: 0 % (ref 0–2)
BILIRUB DIRECT SERPL-MCNC: 13.8 MG/DL (ref 0–0.4)
BILIRUB SERPL-MCNC: 15 MG/DL (ref 0.2–1.3)
BUN SERPL-MCNC: 9 MG/DL (ref 7–20)
CALCIUM: 7.9 MG/DL (ref 8.4–10.2)
CHLORIDE SERPL-SCNC: 101 MMOL/L (ref 98–107)
CO2 SERPL-SCNC: 28 MMOL/L (ref 22–30)
DACRYOCYTES BLD QL SMEAR: SLIGHT
DOHLE BOD BLD QL SMEAR: PRESENT
EOSINOPHIL NFR BLD MANUAL: 3 % (ref 0–6)
ERYTHROCYTE [DISTWIDTH] IN BLOOD BY AUTOMATED COUNT: 16.5 % (ref 11.5–14)
GLUCOSE SERPL-MCNC: 102 MG/DL (ref 75–110)
HCT VFR BLD CALC: 34.6 % (ref 37.9–51)
HGB BLD-MCNC: 11.9 G/DL (ref 13.5–17)
INR PPP: 1.08
MACROCYTES BLD QL SMEAR: (no result)
MCH RBC QN AUTO: 34.8 PG (ref 27–33.4)
MCHC RBC AUTO-ENTMCNC: 34.4 G/DL (ref 32–36)
MCV RBC AUTO: 101 FL (ref 80–97)
MONOCYTES % (MANUAL): 17 % (ref 3–13)
NEUTS BAND NFR BLD MANUAL: 3 % (ref 3–5)
PHOSPHATE SERPL-MCNC: 2.4 MG/DL (ref 2.5–4.5)
PLATELET # BLD: 150 10^3/UL (ref 150–450)
PLATELET COMMENT: ADEQUATE
POTASSIUM SERPL-SCNC: 2.9 MMOL/L (ref 3.6–5)
PROT SERPL-MCNC: 6 G/DL (ref 6.3–8.2)
PROTHROMBIN TIME: 14 SEC (ref 11.4–15.4)
RBC # BLD AUTO: 3.42 10^6/UL (ref 4.35–5.55)
SEGMENTED NEUTROPHILS % (MAN): 61 % (ref 42–78)
TOTAL CELLS COUNTED BLD: 100
TOXIC GRANULES BLD QL SMEAR: SLIGHT
VARIANT LYMPHS NFR BLD MANUAL: 16 % (ref 13–45)
WBC # BLD AUTO: 5.5 10^3/UL (ref 4–10.5)
WBC TOXIC VACUOLES BLD QL SMEAR: PRESENT

## 2020-02-24 RX ADMIN — METOCLOPRAMIDE SCH MLS/HR: 5 INJECTION, SOLUTION INTRAMUSCULAR; INTRAVENOUS at 09:12

## 2020-02-24 RX ADMIN — DIBASIC SODIUM PHOSPHATE, MONOBASIC POTASSIUM PHOSPHATE AND MONOBASIC SODIUM PHOSPHATE SCH MG: 852; 155; 130 TABLET ORAL at 12:41

## 2020-02-24 RX ADMIN — FAMOTIDINE SCH MG: 20 TABLET, FILM COATED ORAL at 21:23

## 2020-02-24 RX ADMIN — DIBASIC SODIUM PHOSPHATE, MONOBASIC POTASSIUM PHOSPHATE AND MONOBASIC SODIUM PHOSPHATE SCH MG: 852; 155; 130 TABLET ORAL at 05:51

## 2020-02-24 RX ADMIN — POTASSIUM CHLORIDE SCH MEQ: 1.5 FOR SOLUTION ORAL at 12:42

## 2020-02-24 RX ADMIN — HEPARIN SODIUM SCH UNIT: 5000 INJECTION, SOLUTION INTRAVENOUS; SUBCUTANEOUS at 05:51

## 2020-02-24 RX ADMIN — NAFCILLIN SODIUM SCH MLS/HR: 2 INJECTION, POWDER, FOR SOLUTION INTRAMUSCULAR; INTRAVENOUS at 01:42

## 2020-02-24 RX ADMIN — SODIUM CHLORIDE, SODIUM LACTATE, POTASSIUM CHLORIDE, AND CALCIUM CHLORIDE PRN MLS/HR: .6; .31; .03; .02 INJECTION, SOLUTION INTRAVENOUS at 03:56

## 2020-02-24 RX ADMIN — HEPARIN SODIUM SCH: 5000 INJECTION, SOLUTION INTRAVENOUS; SUBCUTANEOUS at 14:51

## 2020-02-24 RX ADMIN — HEPARIN SODIUM SCH UNIT: 5000 INJECTION, SOLUTION INTRAVENOUS; SUBCUTANEOUS at 21:22

## 2020-02-24 RX ADMIN — CALCIUM GLUCONATE SCH MLS/HR: 20 INJECTION, SOLUTION INTRAVENOUS at 09:13

## 2020-02-24 RX ADMIN — DIAZEPAM PRN MG: 5 INJECTION, SOLUTION INTRAMUSCULAR; INTRAVENOUS at 01:46

## 2020-02-24 RX ADMIN — CALCIUM GLUCONATE SCH MLS/HR: 20 INJECTION, SOLUTION INTRAVENOUS at 11:02

## 2020-02-24 RX ADMIN — POTASSIUM CHLORIDE SCH MEQ: 1.5 FOR SOLUTION ORAL at 05:55

## 2020-02-24 RX ADMIN — SODIUM CHLORIDE, SODIUM LACTATE, POTASSIUM CHLORIDE, AND CALCIUM CHLORIDE PRN MLS/HR: .6; .31; .03; .02 INJECTION, SOLUTION INTRAVENOUS at 18:14

## 2020-02-24 RX ADMIN — NAFCILLIN SODIUM SCH MLS/HR: 2 INJECTION, POWDER, FOR SOLUTION INTRAMUSCULAR; INTRAVENOUS at 05:53

## 2020-02-24 RX ADMIN — FAMOTIDINE SCH MG: 20 TABLET, FILM COATED ORAL at 09:13

## 2020-02-24 RX ADMIN — CEFTRIAXONE SCH MLS/HR: 1 INJECTION, SOLUTION INTRAVENOUS at 09:12

## 2020-02-24 RX ADMIN — DIBASIC SODIUM PHOSPHATE, MONOBASIC POTASSIUM PHOSPHATE AND MONOBASIC SODIUM PHOSPHATE SCH MG: 852; 155; 130 TABLET ORAL at 00:58

## 2020-02-24 RX ADMIN — POTASSIUM CHLORIDE SCH MEQ: 1.5 FOR SOLUTION ORAL at 17:33

## 2020-02-24 NOTE — RADIOLOGY REPORT (SQ)
Ultrasound of the right upper quadrant of the abdomen: 2/24/2020

5:50 AM CST



Technique: Multiple grayscale color Doppler images of the right

upper quadrant of the abdomen were obtained.



Comparison: None available



History: 49-year old patient with concern for cholecystitis.



Findings: The visualized portions of the hepatic parenchyma

appears diffusely echogenic. There is no evidence to suggest

intra or extrahepatic ductal dilatation. There is normal

directional flow seen within the main portal vein. There is some

possible sludge within the gallbladder lumen with trace

pericholecystic fluid and possible gallbladder wall thickening.

The common duct measures 4-5 mm. The right kidney measures up to

10.4 cm in length. The right kidney demonstrates normal cortical

echogenicity with no evidence to suggest hydronephrosis. The

visualized portions of the IVC, abdominal aorta, and pancreatic

head appear normal. No free intraperitoneal fluid is seen.



Impression: 1. There is likely some sludge within the gallbladder

lumen with suggestion of possible trace gallbladder wall

thickening and trace pericholecystic fluid. The common duct is

within normal limits of size.

2. Hepatic steatosis

## 2020-02-24 NOTE — PD[facility] CRITICAL CARE PROG REPORT
General


Date:: 02/24/20


ICU Day:: 3


Hospital Day:: 6


Resuscitation Status: Full Code


Events in the past 12 to 24 Hours:: 





More awake, bilirubin higher.


Review of systems relevant to events:: 





Neurologic, GI.


Reason for ICU Addmission:: Worsening alcohol WD, tachycardia, agitation.





- Medications:


Medications reviewed and adjusted accordingly: Yes


Vasopressors:: 





None


Sedation:: 





None





Physical Exam


Vital Signs: 


                                        











Temp Pulse Resp BP Pulse Ox


 


 98.4 F   71   17   110/79   94 


 


 02/24/20 05:32  02/23/20 19:00  02/23/20 18:00  02/23/20 18:00  02/23/20 18:00








                                 Intake & Output











 02/23/20 02/24/20 02/25/20





 06:59 06:59 06:59


 


Intake Total 5890.2 2680.2 


 


Output Total 1615 1335 


 


Balance 4275.2 1345.2 


 


Weight 127.9 kg 128.8 kg 








                                  Weight/Height





Weight                           128.8 kg


Height                           5 ft 9 in








General appearance: PRESENT: no acute distress, cooperative, disheveled, obese


Head exam: PRESENT: atraumatic, normocephalic


Eye exam: PRESENT: conjunctiva pink, EOMI, PERRLA.  ABSENT: scleral icterus


Ear exam: PRESENT: normal external ear exam


Mouth exam: PRESENT: moist, tongue midline


Respiratory exam: PRESENT: clear to auscultation cesar.  ABSENT: rales, rhonchi, 

wheezes


Cardiovascular exam: PRESENT: RRR.  ABSENT: diastolic murmur, rubs, systolic mu

rmur


GI/Abdominal exam: PRESENT: diminished bowel sounds, organolmegaly, other - 

Liver edge enlarged still.


Rectal exam: PRESENT: deferred


Gentrourinary exam: PRESENT: indwelling catheter


Extremities exam: PRESENT: pedal edema


Musculoskeletal exam: PRESENT: normal inspection


Neurological exam: PRESENT: altered, other - More awake but still having h

allucinations at times.


Skin exam: PRESENT: jaundice





Laboratory/Radiographs


Laboratory Results: 


                                        





                                 02/21/20 05:35 





                                 02/24/20 04:36 





                                        











  02/24/20 02/24/20 02/24/20





  04:36 04:36 04:36


 


Sodium  138.6  


 


Potassium  2.9 L*  


 


Chloride  101  


 


Carbon Dioxide  28  


 


Anion Gap  10  


 


BUN  9  


 


Creatinine  0.63  


 


Est GFR ( Amer)  > 60  


 


Glucose  102  


 


Calcium  7.9 L  


 


Ionized Calcium Domi   1.05 L 


 


Phosphorus    2.4 L


 


Magnesium    2.0


 


Total Bilirubin  15.0 H  


 


AST  215 H  


 


Alkaline Phosphatase  260 H  


 


Total Protein  6.0 L  


 


Albumin  2.8 L  








                                        





02/21/20 00:48   Blood   Blood Culture (PCR) - Final


                            Staphylococcus Aureus


02/21/20 00:48   Blood   Blood Culture - Final


                            Staphylococcus Aureus


02/21/20 05:30   Blood   Blood Culture (PCR) - Final


                            Staphylococcus Aureus


02/21/20 05:30   Blood   Blood Culture - Final


                            Staphylococcus Aureus





                                        











  02/18/20 02/18/20





  02:30 02:30


 


Creatine Kinase  429 H 


 


CK-MB (CK-2)   3.32


 


Troponin I   0.039











Impressions: 


                                        





KUB X-Ray  02/23/20 05:17


IMPRESSION: A nasogastric tube tip is seen overlying the right


upper abdomen, likely at the duodenum or pylorus.


 











All labs, radiographs, diagnostic studies and EKGs were personally reviewed: Yes


In addition, reports of radiographic and diagnostic studies were read: Yes





Assessment and Plan





- Diagnosis


(1) Alcohol withdrawal


Qualifiers: 


   Complication of substance-induced condition: with delirium   Qualified C

ode(s): F10.231 - Alcohol dependence with withdrawal delirium   


Is this a current diagnosis for this admission?: Yes   


Plan: 


Off precedex and a bit more awake. Still delirious and having hallucinations at 

times. Continue with just ativan.








(2) Acute renal failure


Qualifiers: 


   Acute renal failure type: unspecified   Qualified Code(s): N17.9 - Acute 

kidney failure, unspecified   


Is this a current diagnosis for this admission?: Yes   


Plan: 


Resolved








(3) Alcoholic hepatitis


Qualifiers: 


   Ascites presence: without ascites   Qualified Code(s): K70.10 - Alcoholic 

hepatitis without ascites   


Is this a current diagnosis for this admission?: Yes   


Plan: 


It is problematic his bilirubin is still rising. LFTs are improving and glucose 

is holding its own. May need GI input if this trend continues. He is not in HRS.








(4) Hypocalcemia


Is this a current diagnosis for this admission?: Yes   


Plan: 


Still low, will replace.








(5) Hypokalemia


Is this a current diagnosis for this admission?: Yes   


Plan: 


Low, need to replace. Will use K-phos as he is low in phoshorous also.








(6) Hypomagnesemia


Is this a current diagnosis for this admission?: Yes   


Plan: 


Normal for now.








(7) Hypophosphatemia


Is this a current diagnosis for this admission?: Yes   


Plan: 


Will replace.








(8) Hyperammonemia


Is this a current diagnosis for this admission?: Yes   


Plan: 


Level in 40s. Continue lactulose. Beginning to have diarrhea.





Plan Summary: 





No evidence of acalculous cholecystitis. No Shen's sign. Continue ativan.





Critical Time


Critical Time (minutes): 40


Level of Care: ICU


Anticipated discharge: Acute Rehab


Within: Other - Too soon to tell.


-: 


1.  The care of a critical patient is a dynamic process.  This note is a 

representative synopsis but static in nature.  The timeframe for treatments 

given in order is not necessarily the actual time these treatments may have been

done.





2.  This patient requires critical care secondary to ongoing requirements for 

therapy not offered or safe outside the critical care environment.  Transfer to 

a lower level of care will result in altered life or limb morbidity and mort

ality.





3.  Multidisciplinary rounds completed.





4.  ABCDE bundle addressed.

## 2020-02-25 LAB
ALBUMIN SERPL-MCNC: 2.7 G/DL (ref 3.5–5)
ALP SERPL-CCNC: 239 U/L (ref 38–126)
ANION GAP SERPL CALC-SCNC: 8 MMOL/L (ref 5–19)
AST SERPL-CCNC: 187 U/L (ref 17–59)
BILIRUB DIRECT SERPL-MCNC: 11.9 MG/DL (ref 0–0.4)
BILIRUB SERPL-MCNC: 13.5 MG/DL (ref 0.2–1.3)
BUN SERPL-MCNC: 7 MG/DL (ref 7–20)
CALCIUM: 7.7 MG/DL (ref 8.4–10.2)
CHLORIDE SERPL-SCNC: 106 MMOL/L (ref 98–107)
CO2 SERPL-SCNC: 25 MMOL/L (ref 22–30)
GLUCOSE SERPL-MCNC: 83 MG/DL (ref 75–110)
HEPATITIS C VIRUS ANTIBODY: <0.1 S/CO RATIO (ref 0–0.9)
POTASSIUM SERPL-SCNC: 4 MMOL/L (ref 3.6–5)
PROT SERPL-MCNC: 5.6 G/DL (ref 6.3–8.2)

## 2020-02-25 RX ADMIN — METOCLOPRAMIDE SCH MLS/HR: 5 INJECTION, SOLUTION INTRAMUSCULAR; INTRAVENOUS at 09:10

## 2020-02-25 RX ADMIN — METOPROLOL TARTRATE PRN MG: 5 INJECTION, SOLUTION INTRAVENOUS at 10:43

## 2020-02-25 RX ADMIN — LACTULOSE SCH GM: 20 SOLUTION ORAL at 15:16

## 2020-02-25 RX ADMIN — HEPARIN SODIUM SCH UNIT: 5000 INJECTION, SOLUTION INTRAVENOUS; SUBCUTANEOUS at 21:07

## 2020-02-25 RX ADMIN — SODIUM CHLORIDE, SODIUM LACTATE, POTASSIUM CHLORIDE, AND CALCIUM CHLORIDE PRN MLS/HR: .6; .31; .03; .02 INJECTION, SOLUTION INTRAVENOUS at 03:51

## 2020-02-25 RX ADMIN — METOPROLOL TARTRATE PRN MG: 5 INJECTION, SOLUTION INTRAVENOUS at 00:54

## 2020-02-25 RX ADMIN — CALCIUM GLUCONATE SCH MLS/HR: 20 INJECTION, SOLUTION INTRAVENOUS at 16:16

## 2020-02-25 RX ADMIN — DIAZEPAM PRN MG: 5 INJECTION, SOLUTION INTRAMUSCULAR; INTRAVENOUS at 06:44

## 2020-02-25 RX ADMIN — DIAZEPAM PRN MG: 5 INJECTION, SOLUTION INTRAMUSCULAR; INTRAVENOUS at 03:55

## 2020-02-25 RX ADMIN — FAMOTIDINE SCH MG: 20 TABLET, FILM COATED ORAL at 21:08

## 2020-02-25 RX ADMIN — DIAZEPAM PRN MG: 5 INJECTION, SOLUTION INTRAMUSCULAR; INTRAVENOUS at 00:55

## 2020-02-25 RX ADMIN — FAMOTIDINE SCH MG: 20 TABLET, FILM COATED ORAL at 09:10

## 2020-02-25 RX ADMIN — METOPROLOL TARTRATE PRN MG: 5 INJECTION, SOLUTION INTRAVENOUS at 21:33

## 2020-02-25 RX ADMIN — CEFTRIAXONE SCH MLS/HR: 1 INJECTION, SOLUTION INTRAVENOUS at 09:11

## 2020-02-25 RX ADMIN — DIAZEPAM PRN MG: 5 INJECTION, SOLUTION INTRAMUSCULAR; INTRAVENOUS at 10:43

## 2020-02-25 RX ADMIN — METOPROLOL TARTRATE PRN MG: 5 INJECTION, SOLUTION INTRAVENOUS at 17:26

## 2020-02-25 RX ADMIN — METOPROLOL TARTRATE PRN MG: 5 INJECTION, SOLUTION INTRAVENOUS at 03:54

## 2020-02-25 RX ADMIN — HEPARIN SODIUM SCH: 5000 INJECTION, SOLUTION INTRAVENOUS; SUBCUTANEOUS at 05:18

## 2020-02-25 RX ADMIN — HEPARIN SODIUM SCH UNIT: 5000 INJECTION, SOLUTION INTRAVENOUS; SUBCUTANEOUS at 15:16

## 2020-02-25 RX ADMIN — LACTULOSE SCH GM: 20 SOLUTION ORAL at 17:26

## 2020-02-25 RX ADMIN — CALCIUM GLUCONATE SCH MLS/HR: 20 INJECTION, SOLUTION INTRAVENOUS at 15:16

## 2020-02-25 RX ADMIN — SODIUM CHLORIDE, SODIUM LACTATE, POTASSIUM CHLORIDE, AND CALCIUM CHLORIDE PRN MLS/HR: .6; .31; .03; .02 INJECTION, SOLUTION INTRAVENOUS at 23:54

## 2020-02-25 NOTE — [facility] CRITICAL CARE PROG REPORT
General


Date:: 02/25/20


ICU Day:: 4


Resuscitation Status: Full Code


Events in the past 12 to 24 Hours:: 





LFTs and bilirubin a bit better.


Review of systems relevant to events:: 





GI, Neurological.


Reason for ICU Addmission:: Worsening alcohol WD, tachycardia, agitation.





- Medications:


Medications reviewed and adjusted accordingly: Yes


Vasopressors:: 





None


Sedation:: 





None








Physical Exam


Vital Signs: 


                                        











Temp Pulse Resp BP Pulse Ox


 


 100.0 F   118 H  17   150/110 H  94 


 


 02/25/20 08:00  02/25/20 10:00  02/25/20 10:00  02/25/20 10:00  02/25/20 10:00








                                 Intake & Output











 02/24/20 02/25/20 02/26/20





 06:59 06:59 06:59


 


Intake Total 2680.2 3953.2 


 


Output Total 1335 1675 500


 


Balance 1345.2 2278.2 -500


 


Weight 128.8 kg 128.2 kg 








                                  Weight/Height





Weight                           128.2 kg


Height                           5 ft 9 in








General appearance: PRESENT: no acute distress, obese


Head exam: PRESENT: atraumatic, normocephalic


Eye exam: PRESENT: conjunctiva pink, EOMI, PERRLA.  ABSENT: scleral icterus


Ear exam: PRESENT: normal external ear exam


Mouth exam: PRESENT: moist, tongue midline


Respiratory exam: PRESENT: clear to auscultation cesar.  ABSENT: rales, rhonchi, 

wheezes


Vascular exam: PRESENT: normal capillary refill


GI/Abdominal exam: PRESENT: normal bowel sounds, organolmegaly, soft.  ABSENT: 

distended, guarding, mass, rebound, tenderness


Rectal exam: PRESENT: deferred


Gentrourinary exam: PRESENT: indwelling catheter


Extremities exam: PRESENT: full ROM.  ABSENT: calf tenderness, clubbing, pedal 

edema


Musculoskeletal exam: PRESENT: normal inspection


Neurological exam: PRESENT: alert, altered, awake, oriented to person


Skin exam: PRESENT: dry, intact, warm.  ABSENT: cyanosis, rash





Laboratory/Radiographs


Laboratory Results: 


                                        





                                 02/24/20 09:23 





                                 02/25/20 03:30 





                                        











  02/25/20 02/25/20





  03:30 03:40


 


Sodium  139.4 


 


Potassium  4.0  D 


 


Chloride  106 


 


Carbon Dioxide  25 


 


Anion Gap  8 


 


BUN  7 


 


Creatinine  0.56 


 


Est GFR (African Amer)  > 60 


 


Glucose  83 


 


Calcium  7.7 L 


 


Ionized Calcium Domi   1.04 L


 


Total Bilirubin  13.5 H 


 


AST  187 H 


 


Alkaline Phosphatase  239 H 


 


Total Protein  5.6 L 


 


Albumin  2.7 L 








                                        











  02/18/20 02/18/20





  02:30 02:30


 


Creatine Kinase  429 H 


 


CK-MB (CK-2)   3.32


 


Troponin I   0.039











Impressions: 


                                        





K[facility] X-Ray  02/23/20 05:17


IMPRESSION: A nasogastric tube tip is seen overlying the right


upper abdomen, likely at the duodenum or pylorus.


 











All labs, radiographs, diagnostic studies and EKGs were personally reviewed: Yes


In addition, reports of radiographic and diagnostic studies were read: Yes





Assessment and Plan





- Diagnosis


(1) Alcohol withdrawal


Qualifiers: 


   Complication of substance-induced condition: with delirium   Qualified 

Code(s): F10.231 - Alcohol dependence with withdrawal delirium   


Is this a current diagnosis for this admission?: Yes   


Plan: 


Still having some delirium but improved. Only on occassional ativan.








(2) Acute renal failure


Qualifiers: 


   Acute renal failure type: unspecified   Qualified Code(s): N17.9 - Acute 

kidney failure, unspecified   


Is this a current diagnosis for this admission?: Yes   


Plan: 


Resolved








(3) Alcoholic hepatitis


Qualifiers: 


   Ascites presence: without ascites   Qualified Code(s): K70.10 - Alcoholic 

hepatitis without ascites   


Is this a current diagnosis for this admission?: Yes   


Plan: 


Slightly improved. Check labs in AM.








(4) Hypocalcemia


Is this a current diagnosis for this admission?: Yes   


Plan: 


Replace.








(5) Hypokalemia


Is this a current diagnosis for this admission?: Yes   


Plan: 


Still low, replace.








(6) Hypomagnesemia


Is this a current diagnosis for this admission?: Yes   


Plan: 


Resolved.








(7) Hypophosphatemia


Is this a current diagnosis for this admission?: Yes   


Plan: 


Resolved.








(8) Hyperammonemia


Is this a current diagnosis for this admission?: Yes   


Plan: 


Still 43, increase lactulose.





Plan Summary: 





Not quite ready for downgrade but I anticipate in a day he will.





Critical Time


Critical Time (minutes): 35


Level of Care: ICU


Anticipated discharge: Acute Rehab


Within: Other - Too soon to tell.


-: 


1.  The care of a critical patient is a dynamic process.  This note is a 

representative synopsis but static in nature.  The timeframe for treatments 

given in order is not necessarily the actual time these treatments may have been

done.





2.  This patient requires critical care secondary to ongoing requirements for 

therapy not offered or safe outside the critical care environment.  Transfer to 

a lower level of care will result in altered life or limb morbidity and 

mortality.





3.  Multidisciplinary rounds completed.





4.  ABCDE bundle addressed.

## 2020-02-26 LAB
ABSOLUTE LYMPHOCYTES# (MANUAL): 1.7 10^3/UL (ref 0.5–4.7)
ABSOLUTE LYMPHOCYTES# (MANUAL): 1.8 10^3/UL (ref 0.5–4.7)
ABSOLUTE MONOCYTES # (MANUAL): 0.7 10^3/UL (ref 0.1–1.4)
ABSOLUTE MONOCYTES # (MANUAL): 0.9 10^3/UL (ref 0.1–1.4)
ADD MANUAL DIFF: YES
ADD MANUAL DIFF: YES
ALBUMIN SERPL-MCNC: 2.7 G/DL (ref 3.5–5)
ALP SERPL-CCNC: 249 U/L (ref 38–126)
ANION GAP SERPL CALC-SCNC: 7 MMOL/L (ref 5–19)
ANISOCYTOSIS BLD QL SMEAR: (no result)
ANISOCYTOSIS BLD QL SMEAR: (no result)
AST SERPL-CCNC: 204 U/L (ref 17–59)
BASOPHILS NFR BLD MANUAL: 0 % (ref 0–2)
BASOPHILS NFR BLD MANUAL: 0 % (ref 0–2)
BILIRUB DIRECT SERPL-MCNC: 13.4 MG/DL (ref 0–0.4)
BILIRUB SERPL-MCNC: 14.9 MG/DL (ref 0.2–1.3)
BUN SERPL-MCNC: 9 MG/DL (ref 7–20)
CALCIUM: 7.9 MG/DL (ref 8.4–10.2)
CHLORIDE SERPL-SCNC: 109 MMOL/L (ref 98–107)
CO2 SERPL-SCNC: 26 MMOL/L (ref 22–30)
DACRYOCYTES BLD QL SMEAR: SLIGHT
EOSINOPHIL NFR BLD MANUAL: 0 % (ref 0–6)
EOSINOPHIL NFR BLD MANUAL: 1 % (ref 0–6)
ERYTHROCYTE [DISTWIDTH] IN BLOOD BY AUTOMATED COUNT: 16.7 % (ref 11.5–14)
ERYTHROCYTE [DISTWIDTH] IN BLOOD BY AUTOMATED COUNT: 17.3 % (ref 11.5–14)
GLUCOSE SERPL-MCNC: 105 MG/DL (ref 75–110)
HCT VFR BLD CALC: 28.6 % (ref 37.9–51)
HCT VFR BLD CALC: 30.8 % (ref 37.9–51)
HGB BLD-MCNC: 10.6 G/DL (ref 13.5–17)
HGB BLD-MCNC: 9.9 G/DL (ref 13.5–17)
MACROCYTES BLD QL SMEAR: (no result)
MACROCYTES BLD QL SMEAR: (no result)
MCH RBC QN AUTO: 34.7 PG (ref 27–33.4)
MCH RBC QN AUTO: 34.7 PG (ref 27–33.4)
MCHC RBC AUTO-ENTMCNC: 34.4 G/DL (ref 32–36)
MCHC RBC AUTO-ENTMCNC: 34.4 G/DL (ref 32–36)
MCV RBC AUTO: 101 FL (ref 80–97)
MCV RBC AUTO: 101 FL (ref 80–97)
MONOCYTES % (MANUAL): 10 % (ref 3–13)
MONOCYTES % (MANUAL): 8 % (ref 3–13)
NEUTS BAND NFR BLD MANUAL: 4 % (ref 3–5)
NEUTS BAND NFR BLD MANUAL: 5 % (ref 3–5)
PLATELET # BLD: 144 10^3/UL (ref 150–450)
PLATELET # BLD: 157 10^3/UL (ref 150–450)
PLATELET COMMENT: (no result)
PLATELET COMMENT: ADEQUATE
POTASSIUM SERPL-SCNC: 3.9 MMOL/L (ref 3.6–5)
PROT SERPL-MCNC: 5.5 G/DL (ref 6.3–8.2)
RBC # BLD AUTO: 2.84 10^6/UL (ref 4.35–5.55)
RBC # BLD AUTO: 3.06 10^6/UL (ref 4.35–5.55)
SEGMENTED NEUTROPHILS % (MAN): 65 % (ref 42–78)
SEGMENTED NEUTROPHILS % (MAN): 67 % (ref 42–78)
TOTAL CELLS COUNTED BLD: 100
TOTAL CELLS COUNTED BLD: 100
VARIANT LYMPHS NFR BLD MANUAL: 20 % (ref 13–45)
VARIANT LYMPHS NFR BLD MANUAL: 20 % (ref 13–45)
WBC # BLD AUTO: 8.6 10^3/UL (ref 4–10.5)
WBC # BLD AUTO: 8.9 10^3/UL (ref 4–10.5)

## 2020-02-26 RX ADMIN — PANTOPRAZOLE SODIUM SCH MG: 40 GRANULE, DELAYED RELEASE ORAL at 17:14

## 2020-02-26 RX ADMIN — SUCRALFATE SCH GM: 1 TABLET ORAL at 23:30

## 2020-02-26 RX ADMIN — LACTULOSE SCH: 20 SOLUTION ORAL at 17:14

## 2020-02-26 RX ADMIN — CALCIUM GLUCONATE SCH MLS/HR: 20 INJECTION, SOLUTION INTRAVENOUS at 10:24

## 2020-02-26 RX ADMIN — METOPROLOL TARTRATE PRN MG: 5 INJECTION, SOLUTION INTRAVENOUS at 16:41

## 2020-02-26 RX ADMIN — LACTULOSE SCH: 20 SOLUTION ORAL at 09:22

## 2020-02-26 RX ADMIN — Medication SCH MG: at 09:21

## 2020-02-26 RX ADMIN — CALCIUM GLUCONATE SCH MLS/HR: 20 INJECTION, SOLUTION INTRAVENOUS at 09:21

## 2020-02-26 RX ADMIN — SUCRALFATE SCH GM: 1 TABLET ORAL at 08:27

## 2020-02-26 RX ADMIN — SUCRALFATE SCH GM: 1 TABLET ORAL at 17:14

## 2020-02-26 RX ADMIN — SODIUM CHLORIDE, SODIUM LACTATE, POTASSIUM CHLORIDE, AND CALCIUM CHLORIDE PRN MLS/HR: .6; .31; .03; .02 INJECTION, SOLUTION INTRAVENOUS at 11:19

## 2020-02-26 RX ADMIN — METOPROLOL TARTRATE PRN MG: 5 INJECTION, SOLUTION INTRAVENOUS at 02:20

## 2020-02-26 RX ADMIN — SODIUM CHLORIDE, SODIUM LACTATE, POTASSIUM CHLORIDE, AND CALCIUM CHLORIDE PRN MLS/HR: .6; .31; .03; .02 INJECTION, SOLUTION INTRAVENOUS at 21:51

## 2020-02-26 RX ADMIN — METOPROLOL TARTRATE PRN MG: 5 INJECTION, SOLUTION INTRAVENOUS at 11:21

## 2020-02-26 RX ADMIN — NICOTINE PRN EACH: 21 PATCH, EXTENDED RELEASE TOPICAL at 14:48

## 2020-02-26 RX ADMIN — HEPARIN SODIUM SCH: 5000 INJECTION, SOLUTION INTRAVENOUS; SUBCUTANEOUS at 06:00

## 2020-02-26 RX ADMIN — SUCRALFATE SCH GM: 1 TABLET ORAL at 12:02

## 2020-02-26 RX ADMIN — CEFTRIAXONE SCH MLS/HR: 1 INJECTION, SOLUTION INTRAVENOUS at 09:21

## 2020-02-26 NOTE — [facility] CRITICAL CARE PROG REPORT
General


Date:: 02/26/20


ICU Day:: 5


Hospital Day:: 8


Resuscitation Status: Full Code


Events in the past 12 to 24 Hours:: 





Having melanotic stools.


Review of systems relevant to events:: 





Neurological, CV and now GI.


Reason for ICU Addmission:: Worsening alcohol WD, tachycardia, agitation.





- Medications:


Medications reviewed and adjusted accordingly: Yes


Vasopressors:: 





None


Sedation:: 





None





Physical Exam


Vital Signs: 


                                        











Temp Pulse Resp BP Pulse Ox


 


 99.5 F   125 H  16   161/104 H  95 


 


 02/26/20 05:50  02/25/20 19:00  02/25/20 18:00  02/25/20 18:00  02/25/20 18:00








                                 Intake & Output











 02/25/20 02/26/20 02/27/20





 06:59 06:59 06:59


 


Intake Total 3953.2 50 


 


Output Total 1675 2930 


 


Balance 2278.2 -2880 


 


Weight 128.2 kg 123.1 kg 








                                  Weight/Height





Weight                           123.1 kg


Height                           5 ft 9 in








General appearance: PRESENT: no acute distress, cooperative


Head exam: PRESENT: atraumatic, normocephalic


Eye exam: PRESENT: conjunctiva pink, EOMI, PERRLA.  ABSENT: scleral icterus


Ear exam: PRESENT: normal external ear exam


Mouth exam: PRESENT: moist, tongue midline


Respiratory exam: PRESENT: clear to auscultation cesar.  ABSENT: rales, rhonchi, 

wheezes


Cardiovascular exam: PRESENT: tachycardia


GI/Abdominal exam: PRESENT: normal bowel sounds, soft.  ABSENT: distended, 

guarding, mass, organolmegaly, rebound, tenderness


Rectal exam: PRESENT: deferred, bloody stool


Gentrourinary exam: PRESENT: indwelling catheter


Extremities exam: PRESENT: +1 edema


Musculoskeletal exam: PRESENT: normal inspection


Neurological exam: PRESENT: alert, altered, awake, other - Still having 

occassional hallucinations


Skin exam: PRESENT: dry, intact, warm.  ABSENT: cyanosis, rash





Laboratory/Radiographs


Laboratory Results: 


                                        





                                 02/26/20 04:38 





                                 02/26/20 03:36 





                                        











  02/26/20 02/26/20 02/26/20





  03:36 03:36 04:30


 


WBC   


 


RBC   


 


Hgb   


 


Hct   


 


MCV   


 


MCH   


 


MCHC   


 


RDW   


 


Plt Count   


 


Seg Neutrophils %   


 


Sodium  142.1  


 


Potassium  3.9  


 


Chloride  109 H  


 


Carbon Dioxide  26  


 


Anion Gap  7  


 


BUN  9  


 


Creatinine  0.50 L  


 


Est GFR ( Amer)  > 60  


 


Glucose  105  


 


Calcium  7.9 L  


 


Ionized Calcium Domi   1.10 L 


 


Total Bilirubin  14.9 H  


 


AST  204 H  


 


Alkaline Phosphatase  249 H  


 


Total Protein  5.5 L  


 


Albumin  2.7 L  


 


Stool Occult Blood    POSITIVE














  02/26/20





  04:38


 


WBC  8.9


 


RBC  3.06 L


 


Hgb  10.6 L


 


Hct  30.8 L


 


MCV  101 H


 


MCH  34.7 H


 


MCHC  34.4


 


RDW  16.7 H


 


Plt Count  157


 


Seg Neutrophils %  Not Reportable


 


Sodium 


 


Potassium 


 


Chloride 


 


Carbon Dioxide 


 


Anion Gap 


 


BUN 


 


Creatinine 


 


Est GFR (African Amer) 


 


Glucose 


 


Calcium 


 


Ionized Calcium Domi 


 


Total Bilirubin 


 


AST 


 


Alkaline Phosphatase 


 


Total Protein 


 


Albumin 


 


Stool Occult Blood 








                                        











  02/18/20 02/18/20





  02:30 02:30


 


Creatine Kinase  429 H 


 


CK-MB (CK-2)   3.32


 


Troponin I   0.039











Impressions: 


                                        





K[facility] X-Ray  02/23/20 05:17


IMPRESSION: A nasogastric tube tip is seen overlying the right


upper abdomen, likely at the duodenum or pylorus.


 











All labs, radiographs, diagnostic studies and EKGs were personally reviewed: Yes


In addition, reports of radiographic and diagnostic studies were read: Yes





Assessment and Plan





- Diagnosis


(1) Alcohol withdrawal


Qualifiers: 


   Complication of substance-induced condition: with delirium   Qualified 

Code(s): F10.231 - Alcohol dependence with withdrawal delirium   


Is this a current diagnosis for this admission?: Yes   


Plan: 


Improved and easily manageble. Still hallucinating at times. On much less 

ativan.








(2) Acute renal failure


Qualifiers: 


   Acute renal failure type: unspecified   Qualified Code(s): N17.9 - Acute 

kidney failure, unspecified   


Is this a current diagnosis for this admission?: Yes   


Plan: 


Resolved.








(3) Alcoholic hepatitis


Qualifiers: 


   Ascites presence: without ascites   Qualified Code(s): K70.10 - Alcoholic 

hepatitis without ascites   


Is this a current diagnosis for this admission?: Yes   


Plan: 


Clinically improving but numbers a bit worse likely due to GI bleeding.








(4) Hypocalcemia


Is this a current diagnosis for this admission?: Yes   


Plan: 


Ionized is nearly normal today. Replace one more time and recheck levels.








(5) Hypokalemia


Is this a current diagnosis for this admission?: Yes   


Plan: 


Resolved








(6) Hypomagnesemia


Is this a current diagnosis for this admission?: Yes   


Plan: 


Only slightly low at 2.4.








(7) Hypophosphatemia


Is this a current diagnosis for this admission?: Yes   


Plan: 


Resolved








(8) Hyperammonemia


Is this a current diagnosis for this admission?: Yes   


Plan: 


Continue lactulose








(9) GI bleed


Qualifiers: 


   GI bleed type/associated pathology: unspecified gastrointestinal hemorrhage 

type   Qualified Code(s): K92.2 - Gastrointestinal hemorrhage, unspecified   


Is this a current diagnosis for this admission?: Yes   


Plan: 


He had melanotic stools. No history of GI bleeding. No GI coverage today. Keep 

in ICU due to GI bleeding. Heparin stopped, vitals stable. Give bolus. Will 

likely need scoping to R/O varices.





Plan Summary: 





IVF bolus, EGD heparin stopped, on H2 blocker and carafate. Change to PPI.





Critical Time


Critical Time (minutes): 35


Level of Care: ICU


Anticipated discharge: Acute Rehab


Within: Other - Too soon to tell.


-: 


1.  The care of a critical patient is a dynamic process.  This note is a 

representative synopsis but static in nature.  The timeframe for treatments 

given in order is not necessarily the actual time these treatments may have been

done.





2.  This patient requires critical care secondary to ongoing requirements for 

therapy not offered or safe outside the critical care environment.  Transfer to 

a lower level of care will result in altered life or limb morbidity and 

mortality.





3.  Multidisciplinary rounds completed.





4.  ABCDE bundle addressed.

## 2020-02-27 LAB
ABSOLUTE LYMPHOCYTES# (MANUAL): 1.9 10^3/UL (ref 0.5–4.7)
ABSOLUTE MONOCYTES # (MANUAL): 1.1 10^3/UL (ref 0.1–1.4)
ADD MANUAL DIFF: YES
ALBUMIN SERPL-MCNC: 2.8 G/DL (ref 3.5–5)
ALP SERPL-CCNC: 275 U/L (ref 38–126)
ANION GAP SERPL CALC-SCNC: 11 MMOL/L (ref 5–19)
ANISOCYTOSIS BLD QL SMEAR: (no result)
AST SERPL-CCNC: 196 U/L (ref 17–59)
BASOPHILS NFR BLD MANUAL: 0 % (ref 0–2)
BILIRUB DIRECT SERPL-MCNC: 15.6 MG/DL (ref 0–0.4)
BILIRUB SERPL-MCNC: 17.2 MG/DL (ref 0.2–1.3)
BUN SERPL-MCNC: 7 MG/DL (ref 7–20)
CALCIUM: 7.9 MG/DL (ref 8.4–10.2)
CHLORIDE SERPL-SCNC: 105 MMOL/L (ref 98–107)
CO2 SERPL-SCNC: 25 MMOL/L (ref 22–30)
EOSINOPHIL NFR BLD MANUAL: 2 % (ref 0–6)
ERYTHROCYTE [DISTWIDTH] IN BLOOD BY AUTOMATED COUNT: 17 % (ref 11.5–14)
GLUCOSE SERPL-MCNC: 90 MG/DL (ref 75–110)
HCT VFR BLD CALC: 31.6 % (ref 37.9–51)
HGB BLD-MCNC: 10.6 G/DL (ref 13.5–17)
MACROCYTES BLD QL SMEAR: (no result)
MCH RBC QN AUTO: 33.7 PG (ref 27–33.4)
MCHC RBC AUTO-ENTMCNC: 33.5 G/DL (ref 32–36)
MCV RBC AUTO: 101 FL (ref 80–97)
MONOCYTES % (MANUAL): 12 % (ref 3–13)
NEUTS BAND NFR BLD MANUAL: 3 % (ref 3–5)
PLATELET # BLD: 153 10^3/UL (ref 150–450)
PLATELET COMMENT: ADEQUATE
POLYCHROMASIA BLD QL SMEAR: SLIGHT
POTASSIUM SERPL-SCNC: 3.6 MMOL/L (ref 3.6–5)
PROT SERPL-MCNC: 6.2 G/DL (ref 6.3–8.2)
RBC # BLD AUTO: 3.14 10^6/UL (ref 4.35–5.55)
SEGMENTED NEUTROPHILS % (MAN): 63 % (ref 42–78)
TOTAL CELLS COUNTED BLD: 100
VARIANT LYMPHS NFR BLD MANUAL: 18 % (ref 13–45)
WBC # BLD AUTO: 9.5 10^3/UL (ref 4–10.5)

## 2020-02-27 RX ADMIN — METOPROLOL TARTRATE PRN MG: 5 INJECTION, SOLUTION INTRAVENOUS at 07:42

## 2020-02-27 RX ADMIN — LACTULOSE SCH GM: 20 SOLUTION ORAL at 18:39

## 2020-02-27 RX ADMIN — LACTULOSE SCH GM: 20 SOLUTION ORAL at 16:00

## 2020-02-27 RX ADMIN — Medication SCH MG: at 10:25

## 2020-02-27 RX ADMIN — SUCRALFATE SCH GM: 1 TABLET ORAL at 15:58

## 2020-02-27 RX ADMIN — SUCRALFATE SCH GM: 1 TABLET ORAL at 05:07

## 2020-02-27 RX ADMIN — PANTOPRAZOLE SODIUM SCH MG: 40 GRANULE, DELAYED RELEASE ORAL at 16:00

## 2020-02-27 RX ADMIN — CEFTRIAXONE SCH MLS/HR: 1 INJECTION, SOLUTION INTRAVENOUS at 10:26

## 2020-02-27 RX ADMIN — SUCRALFATE SCH GM: 1 TABLET ORAL at 18:39

## 2020-02-27 RX ADMIN — PANTOPRAZOLE SODIUM SCH MG: 40 GRANULE, DELAYED RELEASE ORAL at 05:07

## 2020-02-27 RX ADMIN — LACTULOSE SCH GM: 20 SOLUTION ORAL at 10:25

## 2020-02-27 NOTE — [facility] CRITICAL CARE PROG REPORT
General


Date:: 02/27/20


ICU Day:: 6


Hospital Day:: 9


Resuscitation Status: Full Code


Events in the past 12 to 24 Hours:: 





More jaundiced, more awake though.


Review of systems relevant to events:: 





GI, Neurological.


Reason for ICU Addmission:: Worsening alcohol WD, tachycardia, agitation.





- Medications:


Medications reviewed and adjusted accordingly: Yes


Vasopressors:: 





None


Sedation:: 





None





Physical Exam


Vital Signs: 


                                        











Temp Pulse Resp BP Pulse Ox


 


 99.7 F   93   19   175/107 H  99 


 


 02/27/20 08:00  02/27/20 08:00  02/27/20 08:00  02/27/20 08:00  02/27/20 08:00








                                 Intake & Output











 02/26/20 02/27/20 02/28/20





 06:59 06:59 06:59


 


Intake Total 100 3050 


 


Output Total 2930 4535 450


 


Balance -2830 -1485 -450


 


Weight 123.1 kg 121.7 kg 








                                  Weight/Height





Weight                           121.7 kg


Height                           5 ft 9 in








General appearance: PRESENT: no acute distress


Head exam: PRESENT: atraumatic, normocephalic


Eye exam: PRESENT: conjunctiva pink, EOMI, PERRLA.  ABSENT: scleral icterus


Ear exam: PRESENT: normal external ear exam


Mouth exam: PRESENT: moist, tongue midline


Respiratory exam: PRESENT: clear to auscultation cesar.  ABSENT: rales, rhonchi, 

wheezes


Cardiovascular exam: PRESENT: RRR.  ABSENT: diastolic murmur, rubs, systolic 

murmur


GI/Abdominal exam: PRESENT: diminished bowel sounds, firm, other - Caput medusae


Rectal exam: PRESENT: deferred


Gentrourinary exam: PRESENT: indwelling catheter


Extremities exam: PRESENT: full ROM.  ABSENT: calf tenderness, clubbing, pedal 

edema


Musculoskeletal exam: PRESENT: normal inspection


Neurological exam: PRESENT: alert, altered, awake, oriented to person, oriented 

to place, oriented to time


Skin exam: PRESENT: dry, intact, warm.  ABSENT: cyanosis, rash





Laboratory/Radiographs


Laboratory Results: 


                                        





                                 02/27/20 03:49 





                                 02/27/20 03:49 





                                        











  02/26/20 02/27/20 02/27/20





  16:06 03:49 03:49


 


WBC  8.6  9.5 


 


RBC  2.84 L  3.14 L 


 


Hgb  9.9 L  10.6 L 


 


Hct  28.6 L  31.6 L 


 


MCV  101 H  101 H 


 


MCH  34.7 H  33.7 H 


 


MCHC  34.4  33.5 


 


RDW  17.3 H  17.0 H 


 


Plt Count  144 L  153 


 


Seg Neutrophils %  Not Reportable  Not Reportable 


 


Sodium    140.7


 


Potassium    3.6


 


Chloride    105


 


Carbon Dioxide    25


 


Anion Gap    11


 


BUN    7


 


Creatinine    0.48 L


 


Est GFR ( Amer)    > 60


 


Glucose    90


 


Calcium    7.9 L


 


Total Bilirubin    17.2 H


 


AST    196 H


 


Alkaline Phosphatase    275 H


 


Ammonia   


 


Total Protein    6.2 L


 


Albumin    2.8 L














  02/27/20





  03:49


 


WBC 


 


RBC 


 


Hgb 


 


Hct 


 


MCV 


 


MCH 


 


MCHC 


 


RDW 


 


Plt Count 


 


Seg Neutrophils % 


 


Sodium 


 


Potassium 


 


Chloride 


 


Carbon Dioxide 


 


Anion Gap 


 


BUN 


 


Creatinine 


 


Est GFR (African Amer) 


 


Glucose 


 


Calcium 


 


Total Bilirubin 


 


AST 


 


Alkaline Phosphatase 


 


Ammonia  54.2 H


 


Total Protein 


 


Albumin 








                                        











  02/18/20 02/18/20





  02:30 02:30


 


Creatine Kinase  429 H 


 


CK-MB (CK-2)   3.32


 


Troponin I   0.039











Impressions: 


                                        





K[facility] X-Ray  02/23/20 05:17


IMPRESSION: A nasogastric tube tip is seen overlying the right


upper abdomen, likely at the duodenum or pylorus.


 











All labs, radiographs, diagnostic studies and EKGs were personally reviewed: Yes


In addition, reports of radiographic and diagnostic studies were read: Yes





Assessment and Plan





- Diagnosis


(1) Alcohol withdrawal


Qualifiers: 


   Complication of substance-induced condition: with delirium   Qualified 

Code(s): F10.231 - Alcohol dependence with withdrawal delirium   


Is this a current diagnosis for this admission?: Yes   


Plan: 


This has pretty well run its course. Will keep ativan for anxiety.








(2) Acute renal failure


Qualifiers: 


   Acute renal failure type: unspecified   Qualified Code(s): N17.9 - Acute 

kidney failure, unspecified   


Is this a current diagnosis for this admission?: Yes   


Plan: 


Resolved








(3) Alcoholic hepatitis


Qualifiers: 


   Ascites presence: without ascites   Qualified Code(s): K70.10 - Alcoholic 

hepatitis without ascites   


Is this a current diagnosis for this admission?: Yes   


Plan: 


LFTs about the same. Bilirubin up to 17.








(4) Hypocalcemia


Is this a current diagnosis for this admission?: Yes   


Plan: 


Replace








(5) Hypokalemia


Is this a current diagnosis for this admission?: Yes   


Plan: 


Resolved








(6) Hypomagnesemia


Is this a current diagnosis for this admission?: Yes   


Plan: 


Resolved








(7) Hypophosphatemia


Is this a current diagnosis for this admission?: Yes   


Plan: 


Resolved








(8) Hyperammonemia


Is this a current diagnosis for this admission?: Yes   


Plan: 


Up to 54. Lactolose increased to 30mg TID.








(9) GI bleed


Qualifiers: 


   GI bleed type/associated pathology: unspecified gastrointestinal hemorrhage 

type   Qualified Code(s): K92.2 - Gastrointestinal hemorrhage, unspecified   


Is this a current diagnosis for this admission?: Yes   


Plan: 


No recurrence. H/H stable. When GI available may need to touch base regarding 

bleeding and liver.





Plan Summary: 





Try PO liquids and TF if not able.





Critical Time


Critical Time (minutes): 35


Level of Care: ICU


Anticipated discharge: Acute Rehab


Within: Other - Too soon to tell.


-: 


1.  The care of a critical patient is a dynamic process.  This note is a 

representative synopsis but static in nature.  The timeframe for treatments 

given in order is not necessarily the actual time these treatments may have been

done.





2.  This patient requires critical care secondary to ongoing requirements for 

therapy not offered or safe outside the critical care environment.  Transfer to 

a lower level of care will result in altered life or limb morbidity and 

mortality.





3.  Multidisciplinary rounds completed.





4.  ABCDE bundle addressed.

## 2020-02-28 LAB
ABSOLUTE LYMPHOCYTES# (MANUAL): 1.8 10^3/UL (ref 0.5–4.7)
ABSOLUTE LYMPHOCYTES# (MANUAL): 1.8 10^3/UL (ref 0.5–4.7)
ABSOLUTE MONOCYTES # (MANUAL): 0.5 10^3/UL (ref 0.1–1.4)
ABSOLUTE MONOCYTES # (MANUAL): 1.2 10^3/UL (ref 0.1–1.4)
ADD MANUAL DIFF: YES
ADD MANUAL DIFF: YES
ALBUMIN SERPL-MCNC: 3.1 G/DL (ref 3.5–5)
ALP SERPL-CCNC: 291 U/L (ref 38–126)
ANION GAP SERPL CALC-SCNC: 9 MMOL/L (ref 5–19)
ANISOCYTOSIS BLD QL SMEAR: (no result)
ANISOCYTOSIS BLD QL SMEAR: (no result)
AST SERPL-CCNC: 215 U/L (ref 17–59)
BASOPHILS NFR BLD MANUAL: 0 % (ref 0–2)
BASOPHILS NFR BLD MANUAL: 0 % (ref 0–2)
BILIRUB DIRECT SERPL-MCNC: 16.6 MG/DL (ref 0–0.4)
BILIRUB SERPL-MCNC: 18.2 MG/DL (ref 0.2–1.3)
BUN SERPL-MCNC: 7 MG/DL (ref 7–20)
CALCIUM: 8.5 MG/DL (ref 8.4–10.2)
CHLORIDE SERPL-SCNC: 106 MMOL/L (ref 98–107)
CO2 SERPL-SCNC: 26 MMOL/L (ref 22–30)
EOSINOPHIL NFR BLD MANUAL: 0 % (ref 0–6)
EOSINOPHIL NFR BLD MANUAL: 4 % (ref 0–6)
ERYTHROCYTE [DISTWIDTH] IN BLOOD BY AUTOMATED COUNT: 16.9 % (ref 11.5–14)
ERYTHROCYTE [DISTWIDTH] IN BLOOD BY AUTOMATED COUNT: 17.5 % (ref 11.5–14)
GLUCOSE SERPL-MCNC: 80 MG/DL (ref 75–110)
HCT VFR BLD CALC: 30 % (ref 37.9–51)
HCT VFR BLD CALC: 32.3 % (ref 37.9–51)
HGB BLD-MCNC: 10.4 G/DL (ref 13.5–17)
HGB BLD-MCNC: 10.9 G/DL (ref 13.5–17)
MACROCYTES BLD QL SMEAR: (no result)
MCH RBC QN AUTO: 34 PG (ref 27–33.4)
MCH RBC QN AUTO: 34.4 PG (ref 27–33.4)
MCHC RBC AUTO-ENTMCNC: 33.9 G/DL (ref 32–36)
MCHC RBC AUTO-ENTMCNC: 34.5 G/DL (ref 32–36)
MCV RBC AUTO: 100 FL (ref 80–97)
MCV RBC AUTO: 100 FL (ref 80–97)
METAMYELOCYTES % (MANUAL): 1 % (ref 0–1)
MONOCYTES % (MANUAL): 12 % (ref 3–13)
MONOCYTES % (MANUAL): 5 % (ref 3–13)
NEUTS BAND NFR BLD MANUAL: 3 % (ref 3–5)
PLATELET # BLD: 156 10^3/UL (ref 150–450)
PLATELET # BLD: 158 10^3/UL (ref 150–450)
PLATELET COMMENT: ADEQUATE
PLATELET COMMENT: ADEQUATE
POLYCHROMASIA BLD QL SMEAR: (no result)
POLYCHROMASIA BLD QL SMEAR: SLIGHT
POTASSIUM SERPL-SCNC: 3.6 MMOL/L (ref 3.6–5)
PROT SERPL-MCNC: 6.9 G/DL (ref 6.3–8.2)
RBC # BLD AUTO: 3.01 10^6/UL (ref 4.35–5.55)
RBC # BLD AUTO: 3.22 10^6/UL (ref 4.35–5.55)
SEGMENTED NEUTROPHILS % (MAN): 63 % (ref 42–78)
SEGMENTED NEUTROPHILS % (MAN): 77 % (ref 42–78)
STOMATOCYTES BLD QL SMEAR: SLIGHT
TOTAL CELLS COUNTED BLD: 100
TOTAL CELLS COUNTED BLD: 100
TOXIC GRANULES BLD QL SMEAR: SLIGHT
VARIANT LYMPHS NFR BLD MANUAL: 17 % (ref 13–45)
VARIANT LYMPHS NFR BLD MANUAL: 18 % (ref 13–45)
WBC # BLD AUTO: 10.1 10^3/UL (ref 4–10.5)
WBC # BLD AUTO: 10.3 10^3/UL (ref 4–10.5)

## 2020-02-28 RX ADMIN — LACTULOSE SCH GM: 20 SOLUTION ORAL at 10:04

## 2020-02-28 RX ADMIN — PANTOPRAZOLE SODIUM SCH MG: 40 GRANULE, DELAYED RELEASE ORAL at 06:17

## 2020-02-28 RX ADMIN — SODIUM CHLORIDE, SODIUM LACTATE, POTASSIUM CHLORIDE, AND CALCIUM CHLORIDE PRN MLS/HR: .6; .31; .03; .02 INJECTION, SOLUTION INTRAVENOUS at 14:03

## 2020-02-28 RX ADMIN — IBUPROFEN PRN MG: 600 TABLET, FILM COATED ORAL at 21:46

## 2020-02-28 RX ADMIN — LACTULOSE SCH GM: 20 SOLUTION ORAL at 17:14

## 2020-02-28 RX ADMIN — SODIUM CHLORIDE, SODIUM LACTATE, POTASSIUM CHLORIDE, AND CALCIUM CHLORIDE PRN MLS/HR: .6; .31; .03; .02 INJECTION, SOLUTION INTRAVENOUS at 00:38

## 2020-02-28 RX ADMIN — Medication SCH MG: at 10:04

## 2020-02-28 RX ADMIN — SUCRALFATE SCH GM: 1 TABLET ORAL at 17:14

## 2020-02-28 RX ADMIN — LACTULOSE SCH GM: 20 SOLUTION ORAL at 13:47

## 2020-02-28 RX ADMIN — SUCRALFATE SCH GM: 1 TABLET ORAL at 00:40

## 2020-02-28 RX ADMIN — SUCRALFATE SCH GM: 1 TABLET ORAL at 13:47

## 2020-02-28 RX ADMIN — CEFTRIAXONE SCH MLS/HR: 1 INJECTION, SOLUTION INTRAVENOUS at 10:04

## 2020-02-28 RX ADMIN — PANTOPRAZOLE SODIUM SCH MG: 40 GRANULE, DELAYED RELEASE ORAL at 17:14

## 2020-02-28 RX ADMIN — SUCRALFATE SCH GM: 1 TABLET ORAL at 06:17

## 2020-02-28 NOTE — PDOC CRITICAL CARE PROG REPORT
General


Date:: 02/28/20


ICU Day:: 7


Hospital Day:: 10


Resuscitation Status: Full Code


Events in the past 12 to 24 Hours:: 





Higher bilirubin but more awake.


Review of systems relevant to events:: 





GI and neurological.


Reason for ICU Addmission:: Worsening alcohol WD, tachycardia, agitation.





- Medications:


Medications reviewed and adjusted accordingly: Yes


Vasopressors:: 





None


Sedation:: 





None





Physical Exam


Vital Signs: 


                                        











Temp Pulse Resp BP Pulse Ox


 


 98.8 F   87   14   146/105 H  100 


 


 02/28/20 10:00  02/28/20 10:00  02/28/20 10:00  02/28/20 10:00  02/28/20 10:00








                                 Intake & Output











 02/27/20 02/28/20 02/29/20





 06:59 06:59 06:59


 


Intake Total 3050 150 


 


Output Total 4535 3120 455


 


Balance -1485 -2970 -455


 


Weight 121.7 kg 118.9 kg 








                                  Weight/Height





Weight                           118.9 kg


Height                           5 ft 9 in








General appearance: PRESENT: no acute distress, well-developed, well-nourished


Head exam: PRESENT: atraumatic, normocephalic


Eye exam: PRESENT: conjunctiva pink, EOMI, PERRLA.  ABSENT: scleral icterus


Ear exam: PRESENT: normal external ear exam


Mouth exam: PRESENT: moist, tongue midline


Respiratory exam: PRESENT: clear to auscultation cesar.  ABSENT: rales, rhonchi, 

wheezes


Cardiovascular exam: PRESENT: tachycardia


GI/Abdominal exam: PRESENT: diminished bowel sounds, firm, other - No RUQ pain, 

no Mrphy"s sign. Liver smaller.


Rectal exam: PRESENT: deferred


Gentrourinary exam: PRESENT: indwelling catheter


Extremities exam: PRESENT: full ROM.  ABSENT: calf tenderness, clubbing, pedal 

edema


Musculoskeletal exam: PRESENT: normal inspection


Neurological exam: PRESENT: altered, awake, oriented to person, other - Better 

oriented.


Skin exam: PRESENT: jaundice





Laboratory/Radiographs


Laboratory Results: 


                                        





                                 02/28/20 06:31 





                                 02/28/20 03:49 





                                        











  02/28/20 02/28/20 02/28/20





  03:49 03:49 03:49


 


WBC    10.3


 


RBC    3.01 L


 


Hgb    10.4 L


 


Hct    30.0 L


 


MCV    100 H


 


MCH    34.4 H


 


MCHC    34.5


 


RDW    16.9 H


 


Plt Count    156


 


Seg Neutrophils %    Not Reportable


 


Sodium  141.2  


 


Potassium  3.6  


 


Chloride  106  


 


Carbon Dioxide  26  


 


Anion Gap  9  


 


BUN  7  


 


Creatinine  0.47 L  


 


Est GFR ( Amer)  > 60  


 


Glucose  80  


 


Calcium  8.5  


 


Ionized Calcium Domi   1.12 L 


 


Total Bilirubin  18.2 H  


 


AST  215 H  


 


Alkaline Phosphatase  291 H  


 


Ammonia   


 


Total Protein  6.9  


 


Albumin  3.1 L  














  02/28/20 02/28/20





  06:31 06:31


 


WBC  10.1 


 


RBC  3.22 L 


 


Hgb  10.9 L 


 


Hct  32.3 L 


 


MCV  100 H 


 


MCH  34.0 H 


 


MCHC  33.9 


 


RDW  17.5 H 


 


Plt Count  158 


 


Seg Neutrophils %  Not Reportable 


 


Sodium  


 


Potassium  


 


Chloride  


 


Carbon Dioxide  


 


Anion Gap  


 


BUN  


 


Creatinine  


 


Est GFR (African Amer)  


 


Glucose  


 


Calcium  


 


Ionized Calcium Domi  


 


Total Bilirubin  


 


AST  


 


Alkaline Phosphatase  


 


Ammonia   40.3 H


 


Total Protein  


 


Albumin  








                                        











  02/18/20 02/18/20





  02:30 02:30


 


Creatine Kinase  429 H 


 


CK-MB (CK-2)   3.32


 


Troponin I   0.039











Impressions: 


                                        





KUB X-Ray  02/23/20 05:17


IMPRESSION: A nasogastric tube tip is seen overlying the right


upper abdomen, likely at the duodenum or pylorus.


 











All labs, radiographs, diagnostic studies and EKGs were personally reviewed: Yes


In addition, reports of radiographic and diagnostic studies were read: Yes





Assessment and Plan





- Diagnosis


(1) Alcohol withdrawal


Qualifiers: 


   Complication of substance-induced condition: with delirium   Qualified 

Code(s): F10.231 - Alcohol dependence with withdrawal delirium   


Is this a current diagnosis for this admission?: Yes   


Plan: 


Resolved








(2) Acute renal failure


Qualifiers: 


   Acute renal failure type: unspecified   Qualified Code(s): N17.9 - Acute 

kidney failure, unspecified   


Is this a current diagnosis for this admission?: Yes   


Plan: 


Resolved








(3) Alcoholic hepatitis


Qualifiers: 


   Ascites presence: without ascites   Qualified Code(s): K70.10 - Alcoholic 

hepatitis without ascites   


Is this a current diagnosis for this admission?: Yes   


Plan: 


His jaundice looks better but his bilirubin is higher. LFTs are about the same 

or slightly higher. There is no GI coverage this week but will try to touch base

with Dr. Prieto for advice.








(4) Hypocalcemia


Is this a current diagnosis for this admission?: Yes   





(5) Hypokalemia


Is this a current diagnosis for this admission?: Yes   


Plan: 


Resolved








(6) Hypomagnesemia


Is this a current diagnosis for this admission?: Yes   


Plan: 


Resolved








(7) Hypophosphatemia


Is this a current diagnosis for this admission?: Yes   


Plan: 


Resolved








(8) Hyperammonemia


Is this a current diagnosis for this admission?: Yes   





(9) GI bleed


Qualifiers: 


   GI bleed type/associated pathology: unspecified gastrointestinal hemorrhage 

type   Qualified Code(s): K92.2 - Gastrointestinal hemorrhage, unspecified   


Is this a current diagnosis for this admission?: Yes   


Plan: 


There is no evidence of further bleeding. H/H is stable





Plan Summary: 





Would like to speak with Dr. Wiggins about further work up for alcoholic 

hepatitis.





Critical Time


Critical Time (minutes): 35


Level of Care: ICU


Anticipated discharge: Acute Rehab


Within: Other - Too soon to tell.


-: 


1.  The care of a critical patient is a dynamic process.  This note is a 

representative synopsis but static in nature.  The timeframe for treatments 

given in order is not necessarily the actual time these treatments may have been

done.





2.  This patient requires critical care secondary to ongoing requirements for 

therapy not offered or safe outside the critical care environment.  Transfer to 

a lower level of care will result in altered life or limb morbidity and 

mortality.





3.  Multidisciplinary rounds completed.





4.  ABCDE bundle addressed.

## 2020-02-28 NOTE — PROGRESS NOTE
Provider Note


Provider Note: 





I have spoken to Dr. Mclean in an unofficial capacity as he is not on call. His 

LFTs are slightly higher as is his bilirubin. For now we continue conservative 

treatment and if need be obtain a formal consult at a later date.

## 2020-02-29 LAB
ALBUMIN SERPL-MCNC: 2.6 G/DL (ref 3.5–5)
ALP SERPL-CCNC: 242 U/L (ref 38–126)
ANION GAP SERPL CALC-SCNC: 7 MMOL/L (ref 5–19)
AST SERPL-CCNC: 226 U/L (ref 17–59)
BILIRUB DIRECT SERPL-MCNC: 15.1 MG/DL (ref 0–0.4)
BILIRUB SERPL-MCNC: 16.8 MG/DL (ref 0.2–1.3)
BUN SERPL-MCNC: 5 MG/DL (ref 7–20)
CALCIUM: 8.3 MG/DL (ref 8.4–10.2)
CHLORIDE SERPL-SCNC: 107 MMOL/L (ref 98–107)
CO2 SERPL-SCNC: 26 MMOL/L (ref 22–30)
GLUCOSE SERPL-MCNC: 100 MG/DL (ref 75–110)
POTASSIUM SERPL-SCNC: 3 MMOL/L (ref 3.6–5)
PROT SERPL-MCNC: 5.4 G/DL (ref 6.3–8.2)

## 2020-02-29 RX ADMIN — IBUPROFEN PRN MG: 600 TABLET, FILM COATED ORAL at 11:16

## 2020-02-29 RX ADMIN — SUCRALFATE SCH GM: 1 TABLET ORAL at 00:48

## 2020-02-29 RX ADMIN — LACTULOSE SCH GM: 20 SOLUTION ORAL at 18:40

## 2020-02-29 RX ADMIN — SODIUM CHLORIDE, SODIUM LACTATE, POTASSIUM CHLORIDE, AND CALCIUM CHLORIDE PRN MLS/HR: .6; .31; .03; .02 INJECTION, SOLUTION INTRAVENOUS at 03:09

## 2020-02-29 RX ADMIN — NICOTINE PRN EACH: 21 PATCH, EXTENDED RELEASE TOPICAL at 11:16

## 2020-02-29 RX ADMIN — Medication SCH MG: at 11:17

## 2020-02-29 RX ADMIN — PANTOPRAZOLE SODIUM SCH MG: 40 TABLET, DELAYED RELEASE ORAL at 06:18

## 2020-02-29 RX ADMIN — PANTOPRAZOLE SODIUM SCH MG: 40 TABLET, DELAYED RELEASE ORAL at 18:40

## 2020-02-29 RX ADMIN — SUCRALFATE SCH GM: 1 TABLET ORAL at 06:18

## 2020-02-29 RX ADMIN — CEFTRIAXONE SCH MLS/HR: 1 INJECTION, SOLUTION INTRAVENOUS at 11:16

## 2020-02-29 RX ADMIN — SUCRALFATE SCH GM: 1 TABLET ORAL at 23:30

## 2020-02-29 RX ADMIN — SUCRALFATE SCH GM: 1 TABLET ORAL at 11:17

## 2020-02-29 RX ADMIN — SUCRALFATE SCH GM: 1 TABLET ORAL at 18:40

## 2020-02-29 RX ADMIN — LACTULOSE SCH: 20 SOLUTION ORAL at 15:06

## 2020-02-29 RX ADMIN — LACTULOSE SCH GM: 20 SOLUTION ORAL at 11:15

## 2020-02-29 NOTE — PDOC CRITICAL CARE PROG REPORT
General


Date:: 02/29/20


Hospital Day:: 11


Resuscitation Status: Full Code


Events in the past 12 to 24 Hours:: 





More awake oriented and alert. Wants food and to mobilize.


Review of systems relevant to events:: 





Neurological, GI.


Reason for ICU Addmission:: Still in alcoholic hepatitis.





- Medications:


Medications reviewed and adjusted accordingly: Yes


Vasopressors:: 





None


Sedation:: 





None





Physical Exam


Vital Signs: 


                                        











Temp Pulse Resp BP Pulse Ox


 


 98.6 F   84   12   115/79   98 


 


 02/29/20 08:00  02/29/20 08:00  02/29/20 08:00  02/29/20 08:00  02/29/20 08:00








                                 Intake & Output











 02/28/20 02/29/20 03/01/20





 06:59 06:59 06:59


 


Intake Total 150 2033 


 


Output Total 3120 1605 


 


Balance -2970 428 


 


Weight 118.9 kg 117.7 kg 








                                  Weight/Height





Weight                           117.7 kg


Height                           5 ft 9 in








General appearance: PRESENT: no acute distress, obese


Head exam: PRESENT: atraumatic, normocephalic


Eye exam: PRESENT: conjunctiva pink, EOMI, PERRLA, scleral icterus - Icterus 

better.


Ear exam: PRESENT: normal external ear exam


Mouth exam: PRESENT: moist, tongue midline


Respiratory exam: PRESENT: clear to auscultation cesar.  ABSENT: rales, rhonchi, 

wheezes


Cardiovascular exam: PRESENT: RRR.  ABSENT: diastolic murmur, rubs, systolic 

murmur


GI/Abdominal exam: PRESENT: normal bowel sounds, soft.  ABSENT: distended, 

guarding, mass, organolmegaly, rebound, tenderness


Rectal exam: PRESENT: deferred


Extremities exam: PRESENT: full ROM.  ABSENT: calf tenderness, clubbing, pedal 

edema


Musculoskeletal exam: PRESENT: normal inspection


Neurological exam: PRESENT: alert, awake, oriented to person, oriented to place,

oriented to situation


Skin exam: PRESENT: dry, intact, warm.  ABSENT: cyanosis, rash


Tubes/Lines: PRESENT: Central Line





Laboratory/Radiographs


Laboratory Results: 


                                        





                                 02/28/20 06:31 





                                 02/29/20 04:03 





                                        











  02/29/20





  04:03


 


Sodium  139.6


 


Potassium  3.0 L*


 


Chloride  107


 


Carbon Dioxide  26


 


Anion Gap  7


 


BUN  5 L


 


Creatinine  0.49 L


 


Est GFR (African Amer)  > 60


 


Glucose  100


 


Calcium  8.3 L


 


Total Bilirubin  16.8 H


 


AST  226 H


 


Alkaline Phosphatase  242 H


 


Total Protein  5.4 L


 


Albumin  2.6 L








                                        











  02/18/20 02/18/20





  02:30 02:30


 


Creatine Kinase  429 H 


 


CK-MB (CK-2)   3.32


 


Troponin I   0.039











Impressions: 


                                        





KUB X-Ray  02/23/20 05:17


IMPRESSION: A nasogastric tube tip is seen overlying the right


upper abdomen, likely at the duodenum or pylorus.


 











All labs, radiographs, diagnostic studies and EKGs were personally reviewed: Yes


In addition, reports of radiographic and diagnostic studies were read: Yes





Assessment and Plan





- Diagnosis


(1) Alcohol withdrawal


Qualifiers: 


   Complication of substance-induced condition: with delirium   Qualified 

Code(s): F10.231 - Alcohol dependence with withdrawal delirium   


Is this a current diagnosis for this admission?: Yes   


Plan: 


Resolved








(2) Acute renal failure


Qualifiers: 


   Acute renal failure type: unspecified   Qualified Code(s): N17.9 - Acute 

kidney failure, unspecified   


Is this a current diagnosis for this admission?: Yes   


Plan: 


Resolved








(3) Alcoholic hepatitis


Qualifiers: 


   Ascites presence: without ascites   Qualified Code(s): K70.10 - Alcoholic 

hepatitis without ascites   


Is this a current diagnosis for this admission?: Yes   


Plan: 


Still jaundiced but starting to improve. Clinically less yellow and bilirubin 

down somewhat. LFTs still somewhat elevated and are still going up slightly.








(4) Hypocalcemia


Is this a current diagnosis for this admission?: Yes   


Plan: 


Nearly normal.








(5) Hypokalemia


Is this a current diagnosis for this admission?: Yes   


Plan: 


Low again will replace.








(6) Hypomagnesemia


Is this a current diagnosis for this admission?: Yes   


Plan: 


Stable








(7) Hypophosphatemia


Is this a current diagnosis for this admission?: Yes   


Plan: 


Stable.








(8) Hyperammonemia


Is this a current diagnosis for this admission?: Yes   


Plan: 


Lower with higher dose of lactulose








(9) GI bleed


Qualifiers: 


   GI bleed type/associated pathology: unspecified gastrointestinal hemorrhage 

type   Qualified Code(s): K92.2 - Gastrointestinal hemorrhage, unspecified   


Is this a current diagnosis for this admission?: Yes   


Plan: 


No evidence of bleeding, resolved





Plan Summary: 





Regular diet, OOB PT to see.





Critical Time


Critical Time (minutes): 30


Level of Care: IMCU


Anticipated discharge: Home


Within: Other - Within a week.


-: 


1.  The care of a critical patient is a dynamic process.  This note is a 

representative synopsis but static in nature.  The timeframe for treatments 

given in order is not necessarily the actual time these treatments may have been

done.





2.  This patient requires critical care secondary to ongoing requirements for 

therapy not offered or safe outside the critical care environment.  Transfer to 

a lower level of care will result in altered life or limb morbidity and 

mortality.





3.  Multidisciplinary rounds completed.





4.  ABCDE bundle addressed.

## 2020-03-01 LAB
ALBUMIN SERPL-MCNC: 2.9 G/DL (ref 3.5–5)
ALP SERPL-CCNC: 295 U/L (ref 38–126)
ANION GAP SERPL CALC-SCNC: 11 MMOL/L (ref 5–19)
AST SERPL-CCNC: 318 U/L (ref 17–59)
BILIRUB DIRECT SERPL-MCNC: 17.9 MG/DL (ref 0–0.4)
BILIRUB SERPL-MCNC: 19.6 MG/DL (ref 0.2–1.3)
BUN SERPL-MCNC: 6 MG/DL (ref 7–20)
CALCIUM: 8.4 MG/DL (ref 8.4–10.2)
CHLORIDE SERPL-SCNC: 104 MMOL/L (ref 98–107)
CO2 SERPL-SCNC: 23 MMOL/L (ref 22–30)
GLUCOSE SERPL-MCNC: 93 MG/DL (ref 75–110)
PHOSPHATE SERPL-MCNC: 1.4 MG/DL (ref 2.5–4.5)
POTASSIUM SERPL-SCNC: 3.4 MMOL/L (ref 3.6–5)
PROT SERPL-MCNC: 6.7 G/DL (ref 6.3–8.2)

## 2020-03-01 RX ADMIN — LACTULOSE SCH GM: 20 SOLUTION ORAL at 17:26

## 2020-03-01 RX ADMIN — LACTULOSE SCH: 20 SOLUTION ORAL at 13:38

## 2020-03-01 RX ADMIN — IBUPROFEN PRN MG: 600 TABLET, FILM COATED ORAL at 10:39

## 2020-03-01 RX ADMIN — SUCRALFATE SCH GM: 1 TABLET ORAL at 23:31

## 2020-03-01 RX ADMIN — CEFTRIAXONE SCH: 1 INJECTION, SOLUTION INTRAVENOUS at 10:40

## 2020-03-01 RX ADMIN — POTASSIUM CHLORIDE SCH MEQ: 750 TABLET, FILM COATED, EXTENDED RELEASE ORAL at 17:26

## 2020-03-01 RX ADMIN — Medication SCH MG: at 10:39

## 2020-03-01 RX ADMIN — SUCRALFATE SCH GM: 1 TABLET ORAL at 05:21

## 2020-03-01 RX ADMIN — LACTULOSE SCH GM: 20 SOLUTION ORAL at 10:40

## 2020-03-01 RX ADMIN — SUCRALFATE SCH: 1 TABLET ORAL at 15:55

## 2020-03-01 RX ADMIN — SUCRALFATE SCH GM: 1 TABLET ORAL at 17:26

## 2020-03-01 RX ADMIN — PANTOPRAZOLE SODIUM SCH MG: 40 TABLET, DELAYED RELEASE ORAL at 16:02

## 2020-03-01 RX ADMIN — PANTOPRAZOLE SODIUM SCH MG: 40 TABLET, DELAYED RELEASE ORAL at 05:19

## 2020-03-01 NOTE — PDOC CRITICAL CARE PROG REPORT
General


Date:: 03/01/20


Hospital Day:: 12


Resuscitation Status: Full Code


Events in the past 12 to 24 Hours:: 





Seen walking around with PT


Review of systems relevant to events:: 





Neurological, GI


Reason for ICU Addmission:: Still in alcoholic hepatitis.





- Medications:


Medications reviewed and adjusted accordingly: Yes


Vasopressors:: 





None


Sedation:: 





None





Physical Exam


Vital Signs: 


                                        











Temp Pulse Resp BP Pulse Ox


 


 99.7 F   118 H  18   123/70   95 


 


 03/01/20 09:20  03/01/20 07:00  03/01/20 10:44  03/01/20 10:44  03/01/20 10:44








                                 Intake & Output











 02/29/20 03/01/20 03/02/20





 06:59 06:59 06:59


 


Intake Total 2033 1810 


 


Output Total 1605 1365 625


 


Balance 428 445 -625


 


Weight 117.7 kg 118.1 kg 








                                  Weight/Height





Weight                           118.1 kg


Height                           5 ft 9 in








General appearance: PRESENT: no acute distress, obese


Head exam: PRESENT: atraumatic, normocephalic


Eye exam: PRESENT: conjunctiva pink, EOMI, PERRLA.  ABSENT: scleral icterus


Ear exam: PRESENT: normal external ear exam


Mouth exam: PRESENT: moist, tongue midline


Respiratory exam: PRESENT: clear to auscultation cesar.  ABSENT: rales, rhonchi, 

wheezes


Cardiovascular exam: PRESENT: RRR, tachycardia.  ABSENT: diastolic murmur, rubs,

systolic murmur


GI/Abdominal exam: PRESENT: normal bowel sounds, soft.  ABSENT: distended, 

guarding, mass, organolmegaly, rebound, tenderness


Rectal exam: PRESENT: deferred


Gentrourinary exam: PRESENT: indwelling catheter


Extremities exam: PRESENT: full ROM.  ABSENT: calf tenderness, clubbing, pedal 

edema


Musculoskeletal exam: PRESENT: normal inspection


Neurological exam: PRESENT: alert, awake, oriented to person, oriented to place,

oriented to time, oriented to situation


Skin exam: PRESENT: jaundice





Laboratory/Radiographs


Laboratory Results: 


                                        





                                 02/28/20 06:31 





                                 03/01/20 07:40 





                                        











  02/29/20 03/01/20 03/01/20





  22:04 07:40 07:40


 


Sodium   137.8 


 


Potassium  3.4 L  3.4 L 


 


Chloride   104 


 


Carbon Dioxide   23 


 


Anion Gap   11 


 


BUN   6 L 


 


Creatinine   0.48 L 


 


Est GFR ( Amer)   > 60 


 


Glucose   93 


 


Calcium   8.4 


 


Ionized Calcium Domi    1.14


 


Phosphorus   1.4 L 


 


Magnesium   1.9 


 


Total Bilirubin   19.6 H 


 


AST   318 H 


 


Alkaline Phosphatase   295 H 


 


Total Protein   6.7 


 


Albumin   2.9 L 








                                        











  02/18/20 02/18/20





  02:30 02:30


 


Creatine Kinase  429 H 


 


CK-MB (CK-2)   3.32


 


Troponin I   0.039











Impressions: 


                                        





KUB X-Ray  02/23/20 05:17


IMPRESSION: A nasogastric tube tip is seen overlying the right


upper abdomen, likely at the duodenum or pylorus.


 











All labs, radiographs, diagnostic studies and EKGs were personally reviewed: Yes


In addition, reports of radiographic and diagnostic studies were read: Yes





Assessment and Plan





- Diagnosis


(1) Alcohol withdrawal


Qualifiers: 


   Complication of substance-induced condition: with delirium   Qualified 

Code(s): F10.231 - Alcohol dependence with withdrawal delirium   


Is this a current diagnosis for this admission?: Yes   


Plan: 


Resolved








(2) Acute renal failure


Qualifiers: 


   Acute renal failure type: unspecified   Qualified Code(s): N17.9 - Acute 

kidney failure, unspecified   


Is this a current diagnosis for this admission?: Yes   


Plan: 


Resolved








(3) Alcoholic hepatitis


Qualifiers: 


   Ascites presence: without ascites   Qualified Code(s): K70.10 - Alcoholic 

hepatitis without ascites   


Is this a current diagnosis for this admission?: Yes   


Plan: 


Still active with LFTs and bilirubin slightly higher.








(4) Hypocalcemia


Is this a current diagnosis for this admission?: Yes   


Plan: 


Resolved








(5) Hypokalemia


Is this a current diagnosis for this admission?: Yes   





(6) Hypomagnesemia


Is this a current diagnosis for this admission?: Yes   


Plan: 


Resolved








(7) Hypophosphatemia


Is this a current diagnosis for this admission?: Yes   


Plan: 


Slightly low will give K-phos, K 3.4.








(8) Hyperammonemia


Is this a current diagnosis for this admission?: Yes   





(9) GI bleed


Qualifiers: 


   GI bleed type/associated pathology: unspecified gastrointestinal hemorrhage 

type   Qualified Code(s): K92.2 - Gastrointestinal hemorrhage, unspecified   


Is this a current diagnosis for this admission?: Yes   


Plan: 


Resolved.





Plan Summary: 





Alcoholic hepatitis not resolved yet. Ready for the floor.





Critical Time


Critical Time (minutes): 30


Level of Care: IMCU


Anticipated discharge: Acute Rehab


Within: within 72 hours


-: 


1.  The care of a critical patient is a dynamic process.  This note is a 

representative synopsis but static in nature.  The timeframe for treatments 

given in order is not necessarily the actual time these treatments may have been

done.





2.  This patient requires critical care secondary to ongoing requirements for 

therapy not offered or safe outside the critical care environment.  Transfer to 

a lower level of care will result in altered life or limb morbidity and 

mortality.





3.  Multidisciplinary rounds completed.





4.  ABCDE bundle addressed.

## 2020-03-01 NOTE — PROGRESS NOTE
Provider Note


Provider Note: 


Patient being downgraded to the floor from the ICU.  Signout received from 

intensivist.  Chart reviewed.





This is a 49-year-old male who is a heavy alcohol abuser who was initially 

admitted for alcohol withdrawal.  Patient had worsening withdrawal symptoms and 

was admitted to the ICU.  He was also deemed to have alcoholic hepatitis.





On encounter, patient appears comfortable.  Denies acute complaints.  He is 

severely jaundiced.  Labs are remarkable for worsening transaminitis.  Appears 

patient also has been having low-grade fever in the past 72 hours.  He had a 

blood culture which grew MSSA from 2/21/2020.





We will place patient on Rocephin.  Repeat blood cultures to check clearance for

MSSA.  Will order a TTE.  MDF score <32 but MELD is >21.  Start steroids for 

alcoholic hepatitis. DC ibuprofen.

## 2020-03-02 LAB
ABSOLUTE LYMPHOCYTES# (MANUAL): 1.2 10^3/UL (ref 0.5–4.7)
ABSOLUTE MONOCYTES # (MANUAL): 1.7 10^3/UL (ref 0.1–1.4)
ADD MANUAL DIFF: YES
ALBUMIN SERPL-MCNC: 2.9 G/DL (ref 3.5–5)
ALP SERPL-CCNC: 285 U/L (ref 38–126)
ANION GAP SERPL CALC-SCNC: 11 MMOL/L (ref 5–19)
ANISOCYTOSIS BLD QL SMEAR: (no result)
AST SERPL-CCNC: 342 U/L (ref 17–59)
BASO STIPL BLD QL SMEAR: PRESENT
BASOPHILS NFR BLD MANUAL: 0 % (ref 0–2)
BILIRUB DIRECT SERPL-MCNC: 17.3 MG/DL (ref 0–0.4)
BILIRUB SERPL-MCNC: 19.1 MG/DL (ref 0.2–1.3)
BUN SERPL-MCNC: 6 MG/DL (ref 7–20)
CALCIUM: 8.5 MG/DL (ref 8.4–10.2)
CHLORIDE SERPL-SCNC: 105 MMOL/L (ref 98–107)
CO2 SERPL-SCNC: 26 MMOL/L (ref 22–30)
EOSINOPHIL NFR BLD MANUAL: 0 % (ref 0–6)
ERYTHROCYTE [DISTWIDTH] IN BLOOD BY AUTOMATED COUNT: 17.9 % (ref 11.5–14)
GLUCOSE SERPL-MCNC: 88 MG/DL (ref 75–110)
HCT VFR BLD CALC: 29.7 % (ref 37.9–51)
HGB BLD-MCNC: 10.1 G/DL (ref 13.5–17)
MACROCYTES BLD QL SMEAR: (no result)
MCH RBC QN AUTO: 34.4 PG (ref 27–33.4)
MCHC RBC AUTO-ENTMCNC: 34.2 G/DL (ref 32–36)
MCV RBC AUTO: 101 FL (ref 80–97)
MONOCYTES % (MANUAL): 14 % (ref 3–13)
PLATELET # BLD: 227 10^3/UL (ref 150–450)
PLATELET COMMENT: ADEQUATE
POLYCHROMASIA BLD QL SMEAR: (no result)
POTASSIUM SERPL-SCNC: 3.7 MMOL/L (ref 3.6–5)
PROT SERPL-MCNC: 6.5 G/DL (ref 6.3–8.2)
RBC # BLD AUTO: 2.95 10^6/UL (ref 4.35–5.55)
SEGMENTED NEUTROPHILS % (MAN): 76 % (ref 42–78)
TARGETS BLD QL SMEAR: SLIGHT
TOTAL CELLS COUNTED BLD: 100
VARIANT LYMPHS NFR BLD MANUAL: 10 % (ref 13–45)
WBC # BLD AUTO: 11.9 10^3/UL (ref 4–10.5)

## 2020-03-02 RX ADMIN — POTASSIUM CHLORIDE SCH MEQ: 750 TABLET, FILM COATED, EXTENDED RELEASE ORAL at 17:18

## 2020-03-02 RX ADMIN — SUCRALFATE SCH: 1 TABLET ORAL at 13:10

## 2020-03-02 RX ADMIN — METHYLPREDNISOLONE SODIUM SUCCINATE SCH MG: 40 INJECTION, POWDER, FOR SOLUTION INTRAMUSCULAR; INTRAVENOUS at 09:02

## 2020-03-02 RX ADMIN — LACTULOSE SCH GM: 20 SOLUTION ORAL at 17:17

## 2020-03-02 RX ADMIN — LACTULOSE SCH GM: 20 SOLUTION ORAL at 09:03

## 2020-03-02 RX ADMIN — POTASSIUM CHLORIDE SCH MEQ: 750 TABLET, FILM COATED, EXTENDED RELEASE ORAL at 05:56

## 2020-03-02 RX ADMIN — SUCRALFATE SCH GM: 1 TABLET ORAL at 17:18

## 2020-03-02 RX ADMIN — LACTULOSE SCH: 20 SOLUTION ORAL at 13:10

## 2020-03-02 RX ADMIN — PANTOPRAZOLE SODIUM SCH MG: 40 TABLET, DELAYED RELEASE ORAL at 17:18

## 2020-03-02 RX ADMIN — Medication SCH MG: at 09:03

## 2020-03-02 RX ADMIN — SUCRALFATE SCH GM: 1 TABLET ORAL at 05:56

## 2020-03-02 RX ADMIN — CEFTRIAXONE SCH MLS/HR: 1 INJECTION, SOLUTION INTRAVENOUS at 09:02

## 2020-03-02 RX ADMIN — PANTOPRAZOLE SODIUM SCH MG: 40 TABLET, DELAYED RELEASE ORAL at 05:56

## 2020-03-02 NOTE — PDOC PROGRESS REPORT
Subjective


Progress Note for:: 03/02/20


Subjective:: 


3/1: Patient being downgraded to the floor from the ICU.  Signout received from 

intensivist.  Chart reviewed. This is a 49-year-old male who is a heavy alcohol 

abuser who was initially admitted for alcohol withdrawal.  Patient had worsening

withdrawal symptoms and was admitted to the ICU.  He was also deemed to have 

alcoholic hepatitis.





On encounter, patient appears comfortable.  Denies acute complaints.  He is 

severely jaundiced.  Labs are remarkable for worsening transaminitis.  Appears 

patient also has been having low-grade fever in the past 72 hours.  He had a 

blood culture which grew MSSA from 2/21/2020. We will place patient on Rocephin.

 Repeat blood cultures to check clearance for MSSA.  Will order a TTE.  MDF 

score <32 but MELD is >21.  Start steroids for alcoholic hepatitis. DC 

ibuprofen.





3/2: No acute event overnight.  Patient remains jaundiced.  Denies chest pain, 

shortness of breath or abdominal pain.  Repeat blood culture from yesterday have

been negative so far.  TTE pending.  CT of the abdomen pelvis pending.  Liver 

enzymes remain elevated.  Continue steroids.





Reason For Visit: 


ETOH WITHDRAWAL








Physical Exam


Vital Signs: 


                                        











Temp Pulse Resp BP Pulse Ox


 


 97.7 F   90   20   137/82 H  96 


 


 03/02/20 04:06  03/02/20 07:00  03/02/20 04:06  03/02/20 04:06  03/02/20 04:06








                                 Intake & Output











 03/01/20 03/02/20 03/03/20





 06:59 06:59 06:59


 


Intake Total 1810 1325 


 


Output Total 1365 1750 


 


Balance 445 -425 


 


Weight 260 lb 5.855 oz 252 lb 6.868 oz 











General appearance: PRESENT: no acute distress, well-developed, well-nourished


Head exam: PRESENT: atraumatic, normocephalic


Eye exam: PRESENT: EOMI, PERRLA, scleral icterus


Ear exam: PRESENT: normal external ear exam


Mouth exam: PRESENT: moist, tongue midline


Neck exam: ABSENT: carotid bruit, JVD, lymphadenopathy, thyromegaly


Respiratory exam: PRESENT: clear to auscultation cesar.  ABSENT: rales, rhonchi, 

wheezes


Cardiovascular exam: PRESENT: RRR.  ABSENT: diastolic murmur, rubs, systolic 

murmur


Pulses: PRESENT: normal dorsalis pedis pul


GI/Abdominal exam: ABSENT: hyperactive bowel sounds, tenderness


Rectal exam: PRESENT: deferred


Neurological exam: PRESENT: alert, awake, oriented to person, oriented to place,

oriented to time, CN II-XII grossly intact.  ABSENT: motor sensory deficit





Results


Laboratory Results: 


                                        





                                 03/02/20 04:20 





                                 03/02/20 04:20 





                                        











  03/01/20 03/02/20 03/02/20





  07:40 04:20 04:20


 


WBC   11.9 H 


 


RBC   2.95 L 


 


Hgb   10.1 L 


 


Hct   29.7 L 


 


MCV   101 H 


 


MCH   34.4 H 


 


MCHC   34.2 


 


RDW   17.9 H 


 


Plt Count   227 


 


Seg Neutrophils %   Not Reportable 


 


Sodium  137.8   142.1


 


Potassium  3.4 L   3.7


 


Chloride  104   105


 


Carbon Dioxide  23   26


 


Anion Gap  11   11


 


BUN  6 L   6 L


 


Creatinine  0.48 L   0.47 L


 


Est GFR ( Amer)  > 60   > 60


 


Glucose  93   88


 


Calcium  8.4   8.5


 


Phosphorus  1.4 L  


 


Magnesium  1.9  


 


Total Bilirubin  19.6 H   19.1 H


 


AST  318 H   342 H


 


Alkaline Phosphatase  295 H   285 H


 


Total Protein  6.7   6.5


 


Albumin  2.9 L   2.9 L








                                        











  02/18/20 02/18/20





  02:30 02:30


 


Creatine Kinase  429 H 


 


CK-MB (CK-2)   3.32


 


Troponin I   0.039











Impressions: 


                                        





KUB X-Ray  02/23/20 05:17


IMPRESSION: A nasogastric tube tip is seen overlying the right


upper abdomen, likely at the duodenum or pylorus.


 














Assessment and Plan





- Diagnosis


(1) Alcoholic hepatitis


Qualifiers: 


   Ascites presence: without ascites   Qualified Code(s): K70.10 - Alcoholic 

hepatitis without ascites   


Is this a current diagnosis for this admission?: Yes   


Plan: 


Liver enzymes remain elevated.  Started on steroids yesterday.  Continue Solu-

Medrol.








(2) MSSA bacteremia


Is this a current diagnosis for this admission?: Yes   


Plan: 


Continue Rocephin.  TTE pending.








(3) Acute renal failure


Qualifiers: 


   Acute renal failure type: unspecified   Qualified Code(s): N17.9 - Acute 

kidney failure, unspecified   


Is this a current diagnosis for this admission?: Yes   


Plan: 


Resolved.








(4) Alcohol withdrawal


Qualifiers: 


   Complication of substance-induced condition: with delirium   Qualified 

Code(s): F10.231 - Alcohol dependence with withdrawal delirium   


Is this a current diagnosis for this admission?: Yes   


Plan: 


Resolved.








(5) Hypokalemia


Is this a current diagnosis for this admission?: Yes   


Plan: 


Resolved.  Continue p.o. potassium.








(6) Hypomagnesemia


Is this a current diagnosis for this admission?: Yes   


Plan: 


Resolved.








- Time


Time Spent with patient: 25-34 minutes

## 2020-03-02 NOTE — RADIOLOGY REPORT (SQ)
EXAM DESCRIPTION:  CT ABD/PELVIS NO ORAL OR IV



COMPLETED DATE/TIME:  3/2/2020 8:27 am



REASON FOR STUDY:  Worsening jaundice,abd pain,poss ascites



COMPARISON:  Ultrasound of the abdomen from 2/24/2020.



TECHNIQUE:  CT scan of the abdomen and pelvis performed without intravenous or oral contrast. Images 
reviewed with lung, soft tissue, and bone windows. Reconstructed coronal and sagittal MPR images revi
ewed. All images stored on PACS.

All CT scanners at this facility use dose modulation, iterative reconstruction, and/or weight based d
osing when appropriate to reduce radiation dose to as low as reasonably achievable (ALARA).

[facility]: Dose Right  [facility]: CareDose    [facility]: Dose Right    [facility]: Teradose 4D    [facility]: Smart Technologies



RADIATION DOSE:  CT Rad equipment meets quality standard of care and radiation dose reduction techniq
ues were employed. CTDIvol: 16.0 - 26.7 mGy. DLP: 2010 mGy-cm.



LIMITATIONS:  None.



FINDINGS:  LOWER CHEST: Nonspecific ground-glass opacities in the nondependent portions of the upper 
lobes.  There is atherosclerotic calcification of the coronary arteries.  There is no pericardial or 
pleural effusion.

NON-CONTRASTED LIVER, SPLEEN, ADRENALS: Evaluation is limited due to the absence of intravenous contr
ast.  The liver is enlarged.  The diffuse low attenuation of the hepatic parenchyma is consistent wit
h hepatic steatosis.  The spleen is normal in size.  There is no abnormality of the adrenal glands

PANCREAS: No acute gross abnormality.

GALLBLADDER: The high attenuation within the gallbladder lumen could represent sludge.

RIGHT KIDNEY AND URETER: Evaluation is limited due to the absence of intravenous contrast.  There is 
no hydronephrosis, nephrolithiasis, hydroureter or ureterolithiasis.

LEFT KIDNEY AND URETER: Evaluation is limited due to the absence of intravenous contrast.  There is n
o hydronephrosis, nephrolithiasis, hydroureter or ureterolithiasis.

AORTA AND RETROPERITONEUM: No aneurysm of the abdominal aorta.  No retroperitoneal adenopathy, hemorr
ismael or mass.

BOWEL AND PERITONEAL CAVITY: Mild nonspecific stranding of the mesenteric fat.  There is no bowel obs
truction, bowel wall thickening or pericolonic/ perienteric inflammation.  There is no mesenteric alexia
nopathy, free intraperitoneal fluid or omental inflammation.

APPENDIX: Unable to identify the appendix.

PELVIS, BLADDER, AND ABDOMINAL WALL:The urinary bladder is partially distended.  The prostate gland i
s normal in size.  The left inguinal canal is patulous.

BONES: No acute findings.

OTHER: No other finding.



IMPRESSION:  1. Enlarged steatotic liver.  There is no ascites.

2.  Nonspecific ground-glass opacities in the nondependent portions of the upper lobes - correlation 
with clinical findings to exclude an infectious or inflammatory process.

3.  The high attenuation within the gallbladder lumen could represent sludge.



COMMENT:  Quality ID # 436: Final reports with documentation of one or more dose reduction techniques
 (e.g., Automated exposure control, adjustment of the mA and/or kV according to patient size, use of 
iterative reconstruction technique)



TECHNICAL DOCUMENTATION:  JOB ID:  [id number]

 2011 [employer]- All Rights Reserved



Reading location - IP/workstation name: [email or web address]

## 2020-03-03 RX ADMIN — SUCRALFATE SCH GM: 1 TABLET ORAL at 06:01

## 2020-03-03 RX ADMIN — LACTULOSE SCH GM: 20 SOLUTION ORAL at 09:42

## 2020-03-03 RX ADMIN — SUCRALFATE SCH GM: 1 TABLET ORAL at 11:06

## 2020-03-03 RX ADMIN — Medication SCH MG: at 09:44

## 2020-03-03 RX ADMIN — PANTOPRAZOLE SODIUM SCH MG: 40 TABLET, DELAYED RELEASE ORAL at 17:06

## 2020-03-03 RX ADMIN — METHYLPREDNISOLONE SODIUM SUCCINATE SCH MG: 40 INJECTION, POWDER, FOR SOLUTION INTRAMUSCULAR; INTRAVENOUS at 09:44

## 2020-03-03 RX ADMIN — SUCRALFATE SCH GM: 1 TABLET ORAL at 17:06

## 2020-03-03 RX ADMIN — PANTOPRAZOLE SODIUM SCH MG: 40 TABLET, DELAYED RELEASE ORAL at 06:01

## 2020-03-03 RX ADMIN — POTASSIUM CHLORIDE SCH MEQ: 750 TABLET, FILM COATED, EXTENDED RELEASE ORAL at 06:01

## 2020-03-03 RX ADMIN — LACTULOSE SCH GM: 20 SOLUTION ORAL at 17:06

## 2020-03-03 RX ADMIN — CEFTRIAXONE SCH MLS/HR: 1 INJECTION, SOLUTION INTRAVENOUS at 09:42

## 2020-03-03 RX ADMIN — SUCRALFATE SCH GM: 1 TABLET ORAL at 00:15

## 2020-03-03 RX ADMIN — POTASSIUM CHLORIDE SCH MEQ: 750 TABLET, FILM COATED, EXTENDED RELEASE ORAL at 17:06

## 2020-03-03 NOTE — PDOC PROGRESS REPORT
Subjective


Progress Note for:: 03/03/20


Reason For Visit: 


ETOH WITHDRAWAL








Physical Exam


Vital Signs: 


                                        











Temp Pulse Resp BP Pulse Ox


 


 98.3 F   101 H  20   133/77 H  94 


 


 03/03/20 11:52  03/03/20 11:52  03/03/20 11:52  03/03/20 11:52  03/03/20 11:52








                                 Intake & Output











 03/02/20 03/03/20 03/04/20





 06:59 06:59 06:59


 


Intake Total 1325 2783 50


 


Output Total 1750 2350 


 


Balance -425 433 50


 


Weight 114.5 kg 113.3 kg 











General appearance: PRESENT: no acute distress, cooperative, well-developed


Head exam: PRESENT: atraumatic, normocephalic


Eye exam: PRESENT: conjunctiva pink, scleral icterus


Ear exam: PRESENT: normal external ear exam.  ABSENT: bleeding, drainage


Mouth exam: PRESENT: moist, tongue midline


Neck exam: PRESENT: full ROM.  ABSENT: carotid bruit, lymphadenopathy, 

tenderness, tracheal deviation, tracheostomy


Respiratory exam: PRESENT: clear to auscultation cesar, symmetrical, unlabored.  

ABSENT: rales, rhonchi, tachypnea, wheezes


Cardiovascular exam: PRESENT: RRR, +S1, +S2.  ABSENT: diastolic murmur, 

irregular rhythm, systolic murmur


GI/Abdominal exam: PRESENT: distended, normal bowel sounds, soft, tenderness.  

ABSENT: guarding, rigid


Rectal exam: PRESENT: deferred


Gentrourinary exam: ABSENT: indwelling catheter


Extremities exam: PRESENT: +2 edema.  ABSENT: joint swelling


Musculoskeletal exam: PRESENT: ambulatory.  ABSENT: deformity, tenderness


Neurological exam: PRESENT: alert, awake, oriented to person, oriented to place,

oriented to situation, CN II-XII grossly intact


Psychiatric exam: PRESENT: flat affect, other - Anxious for discharge.  ABSENT: 

agitated, anxious, unusual affect


Focused psych exam: ABSENT: delusional, paranoid, pressured speech, restlessness


Skin exam: PRESENT: jaundice





Results


Laboratory Results: 


                                        





                                 03/02/20 04:20 





                                 03/02/20 04:20 





                                        











  02/18/20 02/18/20





  02:30 02:30


 


Creatine Kinase  429 H 


 


CK-MB (CK-2)   3.32


 


Troponin I   0.039











Impressions: 


                                        





KUB X-Ray  02/23/20 05:17


IMPRESSION: A nasogastric tube tip is seen overlying the right


upper abdomen, likely at the duodenum or pylorus.


 








Abdomen/Pelvis CT  03/01/20 09:00


IMPRESSION:  1. Enlarged steatotic liver.  There is no ascites.


2.  Nonspecific ground-glass opacities in the nondependent portions of the upper

lobes - correlation with clinical findings to exclude an infectious or 

inflammatory process.


3.  The high attenuation within the gallbladder lumen could represent sludge.


 














Assessment and Plan





- Diagnosis


(1) Alcoholic hepatitis


Qualifiers: 


   Ascites presence: without ascites   Qualified Code(s): K70.10 - Alcoholic 

hepatitis without ascites   


Is this a current diagnosis for this admission?: Yes   





(2) Acute renal failure


Qualifiers: 


   Acute renal failure type: unspecified   Qualified Code(s): N17.9 - Acute 

kidney failure, unspecified   


Is this a current diagnosis for this admission?: Yes   





(3) Hypokalemia


Is this a current diagnosis for this admission?: Yes   





(4) Hypomagnesemia


Is this a current diagnosis for this admission?: Yes   





(5) MSSA bacteremia


Is this a current diagnosis for this admission?: Yes   





(6) Alcohol withdrawal


Qualifiers: 


   Complication of substance-induced condition: with delirium   Qualified 

Code(s): F10.231 - Alcohol dependence with withdrawal delirium   


Is this a current diagnosis for this admission?: Yes   





(7) Jaundice due to hepatitis


Is this a current diagnosis for this admission?: Yes   





(8) Hepatic steatosis


Is this a current diagnosis for this admission?: Yes   





- Plan Summary


Summary: 


3/3/2020


Still with markedly elevated liver enzymes.  The hepatitis is likely due to 

alcohol.  CT scan did reveal hepatic steatosis possibly is an early precursor to

cirrhosis from his alcoholism.  A triglyceride level can also be checked but 

alcohol is the most likely cause.  He is currently on Solu-Medrol therapy and 

will likely need to see gastroenterology as an outpatient.  The ceftriaxone 

typically does not cause elevation of liver enzymes.


Staph aureus bacteremia-the patient grew methicillin sensitive staph aureus in 

all 4 blood culture bottles from the first set.  Second set of blood cultures 

drawn on March 1 are still negative to this point.  He has 5 more days of 

antibiotic therapy which can be changed to oral therapy.


The acute kidney injury has resolved as has the alcohol withdrawal.


We are still monitoring electrolytes including magnesium and potassium.  His 

potassium still fluctuates in and out of the normal range.  Supplemental therapy

is in place.  The magnesium is currently normal.





- Time


Time Spent with patient: 15-24 minutes


Medications reviewed and adjusted accordingly: Yes


Anticipated discharge: Home

## 2020-03-04 VITALS — DIASTOLIC BLOOD PRESSURE: 71 MMHG | SYSTOLIC BLOOD PRESSURE: 110 MMHG

## 2020-03-04 LAB
ABSOLUTE LYMPHOCYTES# (MANUAL): 1.3 10^3/UL (ref 0.5–4.7)
ABSOLUTE MONOCYTES # (MANUAL): 0.8 10^3/UL (ref 0.1–1.4)
ADD MANUAL DIFF: YES
ALBUMIN SERPL-MCNC: 3 G/DL (ref 3.5–5)
ALP SERPL-CCNC: 287 U/L (ref 38–126)
ANION GAP SERPL CALC-SCNC: 9 MMOL/L (ref 5–19)
ANISOCYTOSIS BLD QL SMEAR: (no result)
AST SERPL-CCNC: 328 U/L (ref 17–59)
BASOPHILS NFR BLD MANUAL: 0 % (ref 0–2)
BILIRUB DIRECT SERPL-MCNC: 17.7 MG/DL (ref 0–0.4)
BILIRUB SERPL-MCNC: 19.7 MG/DL (ref 0.2–1.3)
BUN SERPL-MCNC: 6 MG/DL (ref 7–20)
CALCIUM: 8.2 MG/DL (ref 8.4–10.2)
CHLORIDE SERPL-SCNC: 103 MMOL/L (ref 98–107)
CO2 SERPL-SCNC: 26 MMOL/L (ref 22–30)
EOSINOPHIL NFR BLD MANUAL: 0 % (ref 0–6)
ERYTHROCYTE [DISTWIDTH] IN BLOOD BY AUTOMATED COUNT: 18 % (ref 11.5–14)
GLUCOSE SERPL-MCNC: 99 MG/DL (ref 75–110)
HCT VFR BLD CALC: 31.6 % (ref 37.9–51)
HGB BLD-MCNC: 10.9 G/DL (ref 13.5–17)
MACROCYTES BLD QL SMEAR: (no result)
MCH RBC QN AUTO: 34.6 PG (ref 27–33.4)
MCHC RBC AUTO-ENTMCNC: 34.4 G/DL (ref 32–36)
MCV RBC AUTO: 101 FL (ref 80–97)
MONOCYTES % (MANUAL): 7 % (ref 3–13)
NEUTS BAND NFR BLD MANUAL: 1 % (ref 3–5)
OVALOCYTES BLD QL SMEAR: SLIGHT
PLATELET # BLD: 298 10^3/UL (ref 150–450)
PLATELET COMMENT: ADEQUATE
POIKILOCYTOSIS BLD QL SMEAR: SLIGHT
POLYCHROMASIA BLD QL SMEAR: SLIGHT
POTASSIUM SERPL-SCNC: 3.4 MMOL/L (ref 3.6–5)
PROT SERPL-MCNC: 6.7 G/DL (ref 6.3–8.2)
RBC # BLD AUTO: 3.14 10^6/UL (ref 4.35–5.55)
SCHISTOCYTES BLD QL SMEAR: SLIGHT
SEGMENTED NEUTROPHILS % (MAN): 80 % (ref 42–78)
TOTAL CELLS COUNTED BLD: 100
TOXIC GRANULES BLD QL SMEAR: SLIGHT
VARIANT LYMPHS NFR BLD MANUAL: 12 % (ref 13–45)
WBC # BLD AUTO: 10.9 10^3/UL (ref 4–10.5)
WBC TOXIC VACUOLES BLD QL SMEAR: PRESENT

## 2020-03-04 RX ADMIN — POTASSIUM CHLORIDE SCH MEQ: 750 TABLET, FILM COATED, EXTENDED RELEASE ORAL at 06:26

## 2020-03-04 RX ADMIN — SUCRALFATE SCH GM: 1 TABLET ORAL at 02:30

## 2020-03-04 RX ADMIN — PANTOPRAZOLE SODIUM SCH MG: 40 TABLET, DELAYED RELEASE ORAL at 06:26

## 2020-03-04 RX ADMIN — CEFTRIAXONE SCH MLS/HR: 1 INJECTION, SOLUTION INTRAVENOUS at 09:34

## 2020-03-04 RX ADMIN — LACTULOSE SCH GM: 20 SOLUTION ORAL at 09:29

## 2020-03-04 RX ADMIN — SUCRALFATE SCH GM: 1 TABLET ORAL at 06:29

## 2020-03-04 RX ADMIN — Medication SCH MG: at 09:29

## 2020-03-04 RX ADMIN — METHYLPREDNISOLONE SODIUM SUCCINATE SCH MG: 40 INJECTION, POWDER, FOR SOLUTION INTRAMUSCULAR; INTRAVENOUS at 09:29

## 2020-03-04 NOTE — XCELERA REPORT
[address] [address]

                               Tel: [phone]

                               Fax: [phone]



                      Transthoracic Echocardiogram Report

_______________________________________________________________________________



Name: HALI HARRISON

MRN: [id number]                           Age: 49 yrs

Gender: Male                              : 1970

Patient Status: Inpatient                 Patient Location: [address] [place] [address]

Account #: [id number]

Study Date: 2020 09:27 AM

Accession #: [id number]

_______________________________________________________________________________



Height: 69 in        Weight: 260 lb        BSA: 2.3 m2

_______________________________________________________________________________

Procedure: A two-dimensional transthoracic echocardiogram with color flow and

Doppler was performed. Study Quality: Technically suboptimal. The study was

technically difficult with many images being suboptimal in quality.

Reason For Study: MSSA bacteremia, fever unknown source



History: MSSA bacteremia, / Endocarditis.

Ordering Physician: JAVIER HILL



Performed By: Melissa Ashby

_______________________________________________________________________________



Interpretation Summary

Not a good study to assess for Valvular vegetations / Endocarditis. But no

gross vegetations seen.Recommend STEPAN if clinical suspicion for endocarditis is

high. Study Quality: Technically suboptimal.

The study was technically difficult with many images being suboptimal in

quality.

The left ventricle is normal in size.

Probably no LVN.Normal LVEF of 65%.Probably no regional wall motion

abnormality.

The right ventricle is not well visualized secondary to technical limitations

The left atrial size is normal.

Cannot assess ASD ,VSd or PFO

There is no evidence of mitral valve prolapse.

There is no mitral valve stenosis.

There is no mitral regurgitation noted.

There is no aortic valvular vegetation.

There is no aortic valve stenosis

No aortic regurgitation is present.

There is no tricuspid stenosis.

There is a trace amount of tricuspid regurgitation

Tricuspid regurgitation jet envelope not well defined to measure RV systolic

pressure accurately.

The pulmonic valve is not well visualized.

There is no pulmonic valvular stenosis.

There is a trace amount of pulmonic regurgitation

The aortic root is not well visualized.

There is no pericardial effusion.

Not a good study to assess for Valvular vegetations / Endocarditis. But no

gross vegetations seen.Recommend STEPAN if clinical suspicion for endocarditis is

high.



MMode/2D Measurements & Calculations

RVDd: 2.8 cm  LVIDd: 4.9 cm   FS: 35.3 %             Ao root diam: 2.8 cm



IVSd: 1.1 cm  LVIDs: 3.2 cm   EDV(Teich): 112.1 ml   Ao root area: 6.1 cm2

              LVPWd: 1.0 cm   ESV(Teich): 39.8 ml    LA dimension: 3.6 cm

                              EF(Teich): 64.4 %



Doppler Measurements & Calculations

MV E max radha:      MV P1/2t max radha:     Ao V2 max:         LV V1 max P.1 cm/sec       85.5 cm/sec           179.0 cm/sec       10.3 mmHg

MV A max radha:      MV P1/2t: 43.7 msec   Ao max PG:         LV V1 max:

91.3 cm/sec        MVA(P1/2t): 5.0 cm2   12.8 mmHg          160.4 cm/sec

MV E/A: 1.2        MV dec slope:



                   573.8 cm/sec2

        _______________________________________________________________

PA V2 max:         PI end-d radha:         MV P1/2t-pr_phl:

121.4 cm/sec       102.8 cm/sec          43.7 msec

PA max P.9 mmHg





Left Ventricle

The left ventricle is normal in size. Probably no LVN.Normal LVEF of

65%.Probably no regional wall motion abnormality.



Right Ventricle

The right ventricle is not well visualized secondary to technical limitations.



Atria

The left atrial size is normal. Cannot assess ASD ,VSd or PFO.



Mitral Valve

There is no evidence of mitral valve prolapse. There is no mitral valve

stenosis. There is no mitral regurgitation noted.



Aortic Valve

There is no aortic valvular vegetation. There is no aortic valve stenosis. No

aortic regurgitation is present.





Tricuspid Valve

There is no tricuspid stenosis. There is a trace amount of tricuspid

regurgitation. Tricuspid regurgitation jet envelope not well defined to

measure RV systolic pressure accurately.



Pulmonic Valve

The pulmonic valve is not well visualized. There is no pulmonic valvular

stenosis. There is a trace amount of pulmonic regurgitation.



Great Vessels

The aortic root is not well visualized. The inferior vena cava was not well

visualized.



Effusions

There is no pericardial effusion.





_______________________________________________________________________________

_______________________________________________________________________________



Electronically signed by:      Linda Fernandez      on 2020 11:53 AM



CC: JAVIER HILL Lakshmi

## 2020-03-04 NOTE — PDOC DISCHARGE SUMMARY
Impression





- Admit/DC Date/PCP


Admission Date/Primary Care Provider: 


  02/18/20 05:10





  VIDHYA SIMONS MD





Discharge Date: 03/04/20





- Discharge Diagnosis


(1) Alcoholic hepatitis


Is this a current diagnosis for this admission?: Yes   





(2) Acute renal failure


Is this a current diagnosis for this admission?: Yes   





(3) Hypokalemia


Is this a current diagnosis for this admission?: Yes   





(4) Hypomagnesemia


Is this a current diagnosis for this admission?: Yes   





(5) MSSA bacteremia


Is this a current diagnosis for this admission?: Yes   





(6) Alcohol withdrawal


Is this a current diagnosis for this admission?: Yes   





(7) Jaundice due to hepatitis


Is this a current diagnosis for this admission?: Yes   





(8) Hepatic steatosis


Is this a current diagnosis for this admission?: Yes   





- Assessment


Summary: 


3/3/2020


Still with markedly elevated liver enzymes.  The hepatitis is likely due to 

alcohol.  CT scan did reveal hepatic steatosis possibly is an early precursor to

cirrhosis from his alcoholism.  A triglyceride level can also be checked but 

alcohol is the most likely cause.  He is currently on Solu-Medrol therapy and 

will likely need to see gastroenterology as an outpatient.  The ceftriaxone 

typically does not cause elevation of liver enzymes.


Staph aureus bacteremia-the patient grew methicillin sensitive staph aureus in 

all 4 blood culture bottles from the first set.  Second set of blood cultures 

drawn on March 1 are still negative to this point.  He has 5 more days of 

antibiotic therapy which can be changed to oral therapy.


The acute kidney injury has resolved as has the alcohol withdrawal.


We are still monitoring electrolytes including magnesium and potassium.  His 

potassium still fluctuates in and out of the normal range.  Supplemental therapy

is in place.  The magnesium is currently normal.





- Additional Information


Resuscitation Status: Full Code


Discharge Diet: Cardiac


Discharge Activity: Activity As Tolerated, Slowly Increase Activity


Referrals: 


VIDHYA SIMONS MD [Primary Care Provider] - Follow up as needed


DARRICK BECK MD [ACTIVE STAFF] - 


Prescriptions: 


Sucralfate [Carafate 1 gm Tablet] 1 gm PO ACHS #60 tablet


Lactulose [Cephulac Syrup 20 gm/30 ml Udcup] 20 gm PO DAILY 30 Days #1000 ml


Cephalexin Monohydrate [Keflex 250 mg Capsule] 250 mg PO QID #24 cap


Methylprednisolone [Methylpred Dp] 4 mg PO ASDIR #1 tab.ds.pk


Potassium Chloride 20 meq PO DAILY #14 tablet.er


Pantoprazole Sodium [Protonix 40 mg Dr Tablet] 40 mg PO DAILY #14 tablet.dr


Home Medications: 








Amlodipine Besylate [Norvasc 10 mg Tablet] 10 mg PO DAILY 12/31/19 


Atenolol 100 mg PO DAILY 12/31/19 


Albuterol Sulfate [Albuterol Sulfate Hfa] 2 puff IH Q4HP PRN 02/18/20 


Propranolol HCl [Inderal 10 mg Tablet] 10 mg PO QHS 02/18/20 


Sertraline HCl [Zoloft 50 mg Tablet] 50 mg PO QHS 02/18/20 


Cephalexin Monohydrate [Keflex 250 mg Capsule] 250 mg PO QID #24 cap 03/04/20 


Lactulose [Cephulac Syrup 20 gm/30 ml Udcup] 20 gm PO DAILY 30 Days #1000 ml 

03/04/20 


Methylprednisolone [Methylpred Dp] 4 mg PO ASDIR #1 tab.ds.pk 03/04/20 


Pantoprazole Sodium [Protonix 40 mg Dr Tablet] 40 mg PO DAILY #14 tablet.dr 

03/04/20 


Potassium Chloride 20 meq PO DAILY #14 tablet.er 03/04/20 


Sucralfate [Carafate 1 gm Tablet] 1 gm PO ACHS #60 tablet 03/04/20 











History of Present Illiness


History of Present Illness: 


HALI HARRISON is a 49 year old male with a past medical history of 

polysubstance abuse, alcohol dependence and alcohol withdrawal DTs.  He presents

from [facility] for alcohol withdrawal manifesting and hypertension, 

tachycardia, tremor and hallucinations.  In the emergency department he is found

to have delirium labs reveal hyponatremia, hypokalemia, hypocalcemia h

ypomagnesemia, prolonged QT interval and acute renal failure.  He receives IV 

calcium, Ativan and referred to the hospitalist for admission.  Patient is a 

poor historian unable provide additional history.  Patient last admitted for 

alcohol withdrawal DTs December 31, 2019








Hospital Course


Hospital Course: 


The patient had a difficult hospital course.  After being admitted to the 

medical floor he began having severe agitation from alcohol withdrawal.  

Intermittent medications were ineffective.  He was transferred to the ICU for 

intubation and continuous sedation.  When he was through the withdrawal.  He in 

fact was able to transferred back to [facility].  He was found to have methicillin 

sensitive staph aureus bacteremia which is pansensitive.  He has hepatic 

steatosis with alcohol-related hepatitis and his transaminases and bilirubin are

still elevated but appear to be stable and are beginning to improve.  He is on a

short course of steroids for the inflammation.  He is back on his 

antihypertensive medications with reasonable blood pressure control.  He is 

working with physical therapy but is still weak and will benefit from home 

health.





Physical Exam


Vital Signs: 


                                        











Temp Pulse Resp BP Pulse Ox


 


 98.4 F   116 H  16   136/73 H  96 


 


 03/04/20 08:07  03/04/20 08:07  03/04/20 08:07  03/04/20 08:07  03/04/20 08:07








                                 Intake & Output











 03/03/20 03/04/20 03/05/20





 06:59 06:59 06:59


 


Intake Total 2783 1736 


 


Output Total 2350 2550 


 


Balance 433 -814 


 


Weight 113.3 kg 110.8 kg 











General appearance: PRESENT: no acute distress, well-developed


Eye exam: PRESENT: scleral icterus


Respiratory exam: PRESENT: rales - Faint at bases, symmetrical, unlabored.  

ABSENT: prolonged expiratory phas, rhonchi, tachypnea, wheezes


Cardiovascular exam: PRESENT: RRR, +S1, +S2.  ABSENT: diastolic murmur, systolic

murmur


GI/Abdominal exam: PRESENT: distended, normal bowel sounds, soft.  ABSENT: mass,

tenderness


Rectal exam: ABSENT: deferred


Gentrourinary exam: ABSENT: indwelling catheter


Extremities exam: PRESENT: +1 edema


Musculoskeletal exam: PRESENT: ambulatory, normal inspection.  ABSENT: deformity


Neurological exam: PRESENT: alert, awake, oriented to person, oriented to place,

oriented to time, oriented to situation, CN II-XII grossly intact


Psychiatric exam: PRESENT: appropriate affect.  ABSENT: agitated, anxious, 

unusual affect


Focused psych exam: ABSENT: delusional, paranoid, restlessness


Skin exam: PRESENT: jaundice





Results


Laboratory Results: 


                                        











WBC  10.9 10^3/uL (4.0-10.5)  H  03/04/20  04:37    


 


RBC  3.14 10^6/uL (4.35-5.55)  L  03/04/20  04:37    


 


Hgb  10.9 g/dL (13.5-17.0)  L  03/04/20  04:37    


 


Hct  31.6 % (37.9-51.0)  L  03/04/20  04:37    


 


MCV  101 fl (80-97)  H  03/04/20  04:37    


 


MCH  34.6 pg (27.0-33.4)  H  03/04/20  04:37    


 


MCHC  34.4 g/dL (32.0-36.0)   03/04/20  04:37    


 


RDW  18.0 % (11.5-14.0)  H  03/04/20  04:37    


 


Plt Count  298 10^3/uL (150-450)   03/04/20  04:37    


 


Lymph % (Auto)  Not Reportable   03/04/20  04:37    


 


Mono % (Auto)  Not Reportable   03/04/20  04:37    


 


Eos % (Auto)  Not Reportable   03/04/20  04:37    


 


Baso % (Auto)  Not Reportable   03/04/20  04:37    


 


Absolute Neuts (auto)  Not Reportable   03/04/20  04:37    


 


Absolute Lymphs (auto)  Not Reportable   03/04/20  04:37    


 


Absolute Monos (auto)  Not Reportable   03/04/20  04:37    


 


Absolute Eos (auto)  Not Reportable   03/04/20  04:37    


 


Absolute Basos (auto)  Not Reportable   03/04/20  04:37    


 


Total Counted  100   03/04/20  04:37    


 


Seg Neutrophils %  Not Reportable   03/04/20  04:37    


 


Seg Neuts % (Manual)  80 % (42-78)  H  03/04/20  04:37    


 


Band Neutrophils %  1 % (3-5)  L  03/04/20  04:37    


 


Lymphocytes % (Manual)  12 % (13-45)  L  03/04/20  04:37    


 


Atypical Lymphs %  2 % (0)   02/27/20  03:49    


 


Monocytes % (Manual)  7 % (3-13)   03/04/20  04:37    


 


Eosinophils % (Manual)  0 % (0-6)   03/04/20  04:37    


 


Basophils % (Manual)  0 % (0-2)   03/04/20  04:37    


 


Metamyelocytes %  1 % (0-1)   02/28/20  03:49    


 


Abs Neuts (Manual)  8.8 10^3/uL (1.7-8.2)  H  03/04/20  04:37    


 


Abs Lymphs (Manual)  1.3 10^3/uL (0.5-4.7)   03/04/20  04:37    


 


Abs Monocytes (Manual)  0.8 10^3/uL (0.1-1.4)   03/04/20  04:37    


 


Absolute Eos (Manual)  0.0 10^3/uL (0.0-0.6)   03/04/20  04:37    


 


Abs Basophils (Manual)  0.0 10^3/uL (0.0-0.2)   03/04/20  04:37    


 


Nucleated RBCs  1 /100 WBC (0)   02/21/20  00:48    


 


Toxic Granulation  SLIGHT   03/04/20  04:37    


 


Toxic Vacuolation  PRESENT   03/04/20  04:37    


 


Dohle Bodies  PRESENT   02/24/20  09:23    


 


Clumped Platelets  PRESENT   02/21/20  05:35    


 


Large Platelets  PRESENT   02/21/20  05:35    


 


Platelet Comment  ADEQUATE   03/04/20  04:37    


 


Polychromasia  SLIGHT   03/04/20  04:37    


 


Poikilocytosis  SLIGHT   03/04/20  04:37    


 


Basophilic Stippling  PRESENT   03/02/20  04:20    


 


Anisocytosis  1+   03/04/20  04:37    


 


Macrocytosis  1+   03/04/20  04:37    


 


Target Cells  SLIGHT   03/02/20  04:20    


 


Tear Drop Cells  SLIGHT   02/26/20  04:38    


 


Ovalocytes  SLIGHT   03/04/20  04:37    


 


Stomatocytes  SLIGHT   02/28/20  06:31    


 


Schistocytes  SLIGHT   03/04/20  04:37    


 


PT  14.0 SEC (11.4-15.4)   02/24/20  09:23    


 


INR  1.08   02/24/20  09:23    


 


Sodium  138.4 mmol/L (137-145)   03/04/20  04:37    


 


Potassium  3.4 mmol/L (3.6-5.0)  L  03/04/20  04:37    


 


Chloride  103 mmol/L ()   03/04/20  04:37    


 


Carbon Dioxide  26 mmol/L (22-30)   03/04/20  04:37    


 


Anion Gap  9  (5-19)   03/04/20  04:37    


 


BUN  6 mg/dL (7-20)  L  03/04/20  04:37    


 


Creatinine  0.54 mg/dL (0.52-1.25)   03/04/20  04:37    


 


Est GFR ( Amer)  > 60  (>60)   03/04/20  04:37    


 


Est GFR (Non-Af Amer)  Cancelled   02/21/20  05:35    


 


Est GFR (MDRD) Non-Af  > 60  (>60)   03/04/20  04:37    


 


Glucose  99 mg/dL ()   03/04/20  04:37    


 


POC Glucose  103 mg/dL ()   02/21/20  01:20    


 


Calcium  8.2 mg/dL (8.4-10.2)  L  03/04/20  04:37    


 


Ionized Calcium Domi  1.14 mmol/L (1.14-1.30)   03/01/20  07:40    


 


Phosphorus  1.4 mg/dL (2.5-4.5)  L  03/01/20  07:40    


 


Magnesium  2.1 mg/dL (1.6-2.3)   03/04/20  04:37    


 


Total Bilirubin  19.7 mg/dL (0.2-1.3)  H  03/04/20  04:37    


 


Direct Bilirubin  17.7 mg/dL (0.0-0.4)  H  03/04/20  04:37    


 


Neonat Total Bilirubin  Not Reportable   03/04/20  04:37    


 


Neonat Direct Bilirubin  Not Reportable   03/04/20  04:37    


 


Neonat Indirect Bili  Not Reportable   03/04/20  04:37    


 


AST  328 U/L (17-59)  H  03/04/20  04:37    


 


ALT  157 U/L (<50)  H  03/04/20  04:37    


 


Alkaline Phosphatase  287 U/L ()  H  03/04/20  04:37    


 


Ammonia  12.7 umol/L (9-33)   03/04/20  04:37    


 


Creatine Kinase  429 U/L ()  H  02/18/20  02:30    


 


CK-MB (CK-2)  3.32 ng/mL (<4.55)   02/18/20  02:30    


 


Troponin I  0.039 ng/mL  02/18/20  02:30    


 


Total Protein  6.7 g/dL (6.3-8.2)   03/04/20  04:37    


 


Albumin  3.0 g/dL (3.5-5.0)  L  03/04/20  04:37    


 


Lipase  612.5 U/L ()  H  02/18/20  02:30    


 


EGFR   Cancelled   02/21/20  05:35    


 


Urine Color  YOSHI   02/21/20  00:20    


 


Urine Appearance  CLEAR   02/21/20  00:20    


 


Urine pH  7.0  (5.0-9.0)   02/21/20  00:20    


 


Ur Specific Gravity  1.013   02/21/20  00:20    


 


Urine Protein  30 mg/dL (NEGATIVE)  H  02/21/20  00:20    


 


Urine Glucose (UA)  NEGATIVE mg/dL (NEGATIVE)   02/21/20  00:20    


 


Urine Ketones  20 mg/dL (NEGATIVE)  H  02/21/20  00:20    


 


Urine Blood  SMALL  (NEGATIVE)  H  02/21/20  00:20    


 


Urine Nitrite  NEGATIVE  (NEGATIVE)   02/21/20  00:20    


 


Urine Bilirubin  SMALL  (NEGATIVE)  H  02/21/20  00:20    


 


Urine Urobilinogen  4.0 mg/dL (<2.0)  H  02/21/20  00:20    


 


Ur Leukocyte Esterase  NEGATIVE  (NEGATIVE)   02/21/20  00:20    


 


Urine WBC (Auto)  2 /HPF  02/21/20  00:20    


 


Urine RBC (Auto)  35 /HPF  02/21/20  00:20    


 


U Hyaline Cast (Auto)  24 /LPF  02/18/20  04:15    


 


Urine Bacteria (Auto)  TRACE /HPF  02/21/20  00:20    


 


Squamous Epi Cells Auto  <1 /HPF  02/21/20  00:20    


 


Urine Mucus (Auto)  RARE /LPF  02/21/20  00:20    


 


Urine Ascorbic Acid  NEGATIVE  (NEGATIVE)   02/21/20  00:20    


 


Stool Occult Blood  POSITIVE  (NEGATIVE)   02/26/20  04:30    


 


Salicylates  < 1.0 mg/dL (2.0-20.0)  L  02/18/20  02:30    


 


Urine Opiates Screen  NEGATIVE   02/18/20  04:15    


 


Urine Methadone Screen  NEGATIVE   02/18/20  04:15    


 


Acetaminophen  < 10 ug/mL (10-30)  L  02/18/20  02:30    


 


Ur Barbiturates Screen  NEGATIVE   02/18/20  04:15    


 


Ur Phencyclidine Scrn  NEGATIVE   02/18/20  04:15    


 


Ur Amphetamines Screen  NEGATIVE   02/18/20  04:15    


 


U Benzodiazepines Scrn  UNCONFIRMED POSITIVE   02/18/20  04:15    


 


Urine Cocaine Screen  NEGATIVE   02/18/20  04:15    


 


U Marijuana (THC) Screen  NEGATIVE   02/18/20  04:15    


 


Serum Alcohol  < 10 mg/dL (NONE DETECTED)   02/18/20  02:30    


 


Hepatitis A IgM Ab  Negative  (Negative)   02/24/20  09:23    


 


Hep Bs Antigen  Negative  (Negative)   02/24/20  09:23    


 


Hep B Core IgM Ab  Negative  (Negative)   02/24/20  09:23    


 


Hepatitis C Antibody  <0.1 s/co ratio (0.0-0.9)   02/24/20  09:23    








                                        











  02/18/20





  02:30


 


CK-MB (CK-2)  3.32


 


Troponin I  0.039











Impressions: 


                                        





KUB X-Ray  02/23/20 05:17


IMPRESSION: A nasogastric tube tip is seen overlying the right


upper abdomen, likely at the duodenum or pylorus.


 








Abdomen/Pelvis CT  03/01/20 09:00


IMPRESSION:  1. Enlarged steatotic liver.  There is no ascites.


2.  Nonspecific ground-glass opacities in the nondependent portions of the upper

lobes - correlation with clinical findings to exclude an infectious or 

inflammatory process.


3.  The high attenuation within the gallbladder lumen could represent sludge.


 














Plan


Health Concerns: 


I had a long talk with the patient regarding his alcoholism.  There is a strong 

family history of alcoholism.  He needs to understand that if he does not stop 

drinking his liver will surely go to cirrhosis with end-stage liver failure.


Plan of Treatment: 


Complete antibiotic therapy for staph aureus bacteremia.  Short course of 

prednisone therapy for the hepatitis.  Return to antihypertensive medications.


Goals: 


Alcohol cessation with rehab


Time Spent: Greater than 30 Minutes





Stroke


Is this a Stroke Patient?: No





Acute Heart Failure





- **


Is this a Heart Failure Patient?: No

## 2020-03-19 ENCOUNTER — HOSPITAL ENCOUNTER (INPATIENT)
Dept: HOSPITAL 62 - ER | Age: 50
LOS: 8 days | Discharge: HOME | DRG: 897 | End: 2020-03-27
Attending: INTERNAL MEDICINE | Admitting: FAMILY MEDICINE
Payer: COMMERCIAL

## 2020-03-19 DIAGNOSIS — F12.10: ICD-10-CM

## 2020-03-19 DIAGNOSIS — D64.9: ICD-10-CM

## 2020-03-19 DIAGNOSIS — F14.10: ICD-10-CM

## 2020-03-19 DIAGNOSIS — F17.210: ICD-10-CM

## 2020-03-19 DIAGNOSIS — K21.9: ICD-10-CM
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
## 2020-03-19 DIAGNOSIS — I10: ICD-10-CM

## 2020-03-19 DIAGNOSIS — Z82.49: ICD-10-CM

## 2020-03-19 DIAGNOSIS — F32.9: ICD-10-CM

## 2020-03-19 DIAGNOSIS — E66.9: ICD-10-CM

## 2020-03-19 DIAGNOSIS — F10.231: Primary | ICD-10-CM

## 2020-03-19 DIAGNOSIS — E87.6: ICD-10-CM

## 2020-03-19 DIAGNOSIS — Z79.899: ICD-10-CM

## 2020-03-19 DIAGNOSIS — F19.10: ICD-10-CM

## 2020-03-19 DIAGNOSIS — J44.9: ICD-10-CM

## 2020-03-19 DIAGNOSIS — K70.10: ICD-10-CM

## 2020-03-19 DIAGNOSIS — K70.30: ICD-10-CM

## 2020-03-19 DIAGNOSIS — Z88.8: ICD-10-CM

## 2020-03-19 LAB
ABSOLUTE LYMPHOCYTES# (MANUAL): 1.9 10^3/UL (ref 0.5–4.7)
ABSOLUTE MONOCYTES # (MANUAL): 0.6 10^3/UL (ref 0.1–1.4)
ADD MANUAL DIFF: YES
ALBUMIN SERPL-MCNC: 2.7 G/DL (ref 3.5–5)
ALP SERPL-CCNC: 321 U/L (ref 38–126)
ANION GAP SERPL CALC-SCNC: 13 MMOL/L (ref 5–19)
ANISOCYTOSIS BLD QL SMEAR: (no result)
APPEARANCE UR: (no result)
APTT PPP: (no result) S
AST SERPL-CCNC: 204 U/L (ref 17–59)
BARBITURATES UR QL SCN: NEGATIVE
BASOPHILS NFR BLD MANUAL: 0 % (ref 0–2)
BILIRUB DIRECT SERPL-MCNC: 16.1 MG/DL (ref 0–0.4)
BILIRUB SERPL-MCNC: 18 MG/DL (ref 0.2–1.3)
BILIRUB UR QL STRIP: (no result)
BUN SERPL-MCNC: 7 MG/DL (ref 7–20)
CALCIUM: 7.7 MG/DL (ref 8.4–10.2)
CHLORIDE SERPL-SCNC: 97 MMOL/L (ref 98–107)
CO2 SERPL-SCNC: 25 MMOL/L (ref 22–30)
DACRYOCYTES BLD QL SMEAR: SLIGHT
EOSINOPHIL NFR BLD MANUAL: 0 % (ref 0–6)
ERYTHROCYTE [DISTWIDTH] IN BLOOD BY AUTOMATED COUNT: 17.2 % (ref 11.5–14)
ETHANOL SERPL-MCNC: 191 MG/DL
GLUCOSE SERPL-MCNC: 91 MG/DL (ref 75–110)
GLUCOSE UR STRIP-MCNC: NEGATIVE MG/DL
HCT VFR BLD CALC: 28.6 % (ref 37.9–51)
HGB BLD-MCNC: 10 G/DL (ref 13.5–17)
KETONES UR STRIP-MCNC: NEGATIVE MG/DL
MACROCYTES BLD QL SMEAR: (no result)
MCH RBC QN AUTO: 35.7 PG (ref 27–33.4)
MCHC RBC AUTO-ENTMCNC: 34.9 G/DL (ref 32–36)
MCV RBC AUTO: 102 FL (ref 80–97)
METHADONE UR QL SCN: NEGATIVE
MONOCYTES % (MANUAL): 4 % (ref 3–13)
NEUTS BAND NFR BLD MANUAL: 2 % (ref 3–5)
PCP UR QL SCN: NEGATIVE
PH UR STRIP: 6 [PH] (ref 5–9)
PLATELET # BLD: 193 10^3/UL (ref 150–450)
PLATELET COMMENT: ADEQUATE
POIKILOCYTOSIS BLD QL SMEAR: SLIGHT
POLYCHROMASIA BLD QL SMEAR: SLIGHT
POTASSIUM SERPL-SCNC: 3 MMOL/L (ref 3.6–5)
PROT SERPL-MCNC: 6.3 G/DL (ref 6.3–8.2)
PROT UR STRIP-MCNC: 30 MG/DL
RBC # BLD AUTO: 2.79 10^6/UL (ref 4.35–5.55)
SEGMENTED NEUTROPHILS % (MAN): 81 % (ref 42–78)
SP GR UR STRIP: 1.02
TARGETS BLD QL SMEAR: (no result)
TOTAL CELLS COUNTED BLD: 100
URINE AMPHETAMINES SCREEN: NEGATIVE
URINE BENZODIAZEPINES SCREEN: (no result)
URINE COCAINE SCREEN: NEGATIVE
URINE MARIJUANA (THC) SCREEN: NEGATIVE
UROBILINOGEN UR-MCNC: 4 MG/DL (ref ?–2)
VARIANT LYMPHS NFR BLD MANUAL: 13 % (ref 13–45)
WBC # BLD AUTO: 14.4 10^3/UL (ref 4–10.5)
WBC TOXIC VACUOLES BLD QL SMEAR: PRESENT

## 2020-03-19 PROCEDURE — 83735 ASSAY OF MAGNESIUM: CPT

## 2020-03-19 PROCEDURE — 81001 URINALYSIS AUTO W/SCOPE: CPT

## 2020-03-19 PROCEDURE — 85027 COMPLETE CBC AUTOMATED: CPT

## 2020-03-19 PROCEDURE — 83690 ASSAY OF LIPASE: CPT

## 2020-03-19 PROCEDURE — 99285 EMERGENCY DEPT VISIT HI MDM: CPT

## 2020-03-19 PROCEDURE — 85610 PROTHROMBIN TIME: CPT

## 2020-03-19 PROCEDURE — 80053 COMPREHEN METABOLIC PANEL: CPT

## 2020-03-19 PROCEDURE — 80307 DRUG TEST PRSMV CHEM ANLYZR: CPT

## 2020-03-19 PROCEDURE — 84132 ASSAY OF SERUM POTASSIUM: CPT

## 2020-03-19 PROCEDURE — 71045 X-RAY EXAM CHEST 1 VIEW: CPT

## 2020-03-19 PROCEDURE — 96375 TX/PRO/DX INJ NEW DRUG ADDON: CPT

## 2020-03-19 PROCEDURE — 36415 COLL VENOUS BLD VENIPUNCTURE: CPT

## 2020-03-19 PROCEDURE — 96361 HYDRATE IV INFUSION ADD-ON: CPT

## 2020-03-19 PROCEDURE — 82140 ASSAY OF AMMONIA: CPT

## 2020-03-19 PROCEDURE — 87040 BLOOD CULTURE FOR BACTERIA: CPT

## 2020-03-19 PROCEDURE — 85730 THROMBOPLASTIN TIME PARTIAL: CPT

## 2020-03-19 PROCEDURE — 87070 CULTURE OTHR SPECIMN AEROBIC: CPT

## 2020-03-19 PROCEDURE — 85025 COMPLETE CBC W/AUTO DIFF WBC: CPT

## 2020-03-19 PROCEDURE — 80048 BASIC METABOLIC PNL TOTAL CA: CPT

## 2020-03-19 PROCEDURE — 94640 AIRWAY INHALATION TREATMENT: CPT

## 2020-03-19 PROCEDURE — 96366 THER/PROPH/DIAG IV INF ADDON: CPT

## 2020-03-19 PROCEDURE — 96365 THER/PROPH/DIAG IV INF INIT: CPT

## 2020-03-19 PROCEDURE — 76705 ECHO EXAM OF ABDOMEN: CPT

## 2020-03-19 NOTE — RADIOLOGY REPORT (SQ)
EXAM DESCRIPTION: 



XR CHEST 1 VIEW



COMPLETED DATE/TME:  03/19/2020 18:31



CLINICAL HISTORY: 



49 years, Male, eval for pneumonia



COMPARISON:

Prior CT dated 3/2/2020.



NUMBER OF VIEWS:

One



TECHNIQUE:

Single frontal view of the chest was obtained portably



LIMITATIONS:

None.



FINDINGS:



Cardiac and mediastinal contours are stable. There is elevation

of the right hemidiaphragm with suspected small right pleural

effusion/right basilar opacity. Left lung is overall clear. No

pneumothorax.



IMPRESSION:



Significantly elevated right hemidiaphragm with suspected small

right pleural effusion/right basilar opacity. Consider

superimposed atelectasis or pneumonia to include aspiration.

 



copyright 2011 [employer]- All Rights Reserved

## 2020-03-19 NOTE — ER DOCUMENT REPORT
ED Medical Screen (RME)





- General


Chief Complaint: ETOH Abuse


Stated Complaint: POSSIBLE WITHDRAWLS ETOH


Time Seen by Provider: 03/19/20 17:09


Primary Care Provider: 


VIDHYA SIMONS MD [Primary Care Provider] - Follow up as needed


Mode of Arrival: Wheelchair


Information source: Patient


Notes: 





49-year-old male presents to ED for complaint of alcohol withdrawal he states he

drank a pint of bourbon between morning and lunchtime.  States he has not had 

any alcohol since lunchtime.  He is alert oriented and answering questions 

appropriately.  His pulse is 130 his O2 sat is 100% blood pressure was elevated.

 He is drinking a bottle of water at this time.











I have greeted and performed a rapid initial assessment of this patient.  A 

comprehensive ED assessment and evaluation of the patient, analysis of test 

results and completion of medical decision making process will be conducted by 

an additional ED providers.


TRAVEL OUTSIDE OF THE [place] IN LAST 30 DAYS: No





- Related Data


Allergies/Adverse Reactions: 


                                        





lisinopril Adverse Reaction (Verified 12/31/19 17:53)


   











Past Medical History





- General


Information source: Patient





- Social History


Cigarette use (# per day): Yes - Pack per day


Frequency of alcohol use: Heavy - States he has been drinking a pint a day


Drug Abuse: None





- Past Medical History


Cardiac Medical History: Reports: Hx Hypertension


Pulmonary Medical History: Reports: Hx COPD


Neurological Medical History: Reports: Hx Seizures


Psychiatric Medical History: Reports: Hx Depression





Physical Exam





- Vital signs


Vitals: 





                                        











Temp Pulse Resp BP Pulse Ox


 


 98.2 F   128 H  20   133/72 H  100 


 


 03/19/20 17:07  03/19/20 17:07  03/19/20 17:07  03/19/20 17:07  03/19/20 17:07














Course





- Vital Signs


Vital signs: 





                                        











Temp Pulse Resp BP Pulse Ox


 


 98.2 F   128 H  20   133/72 H  100 


 


 03/19/20 17:07  03/19/20 17:07  03/19/20 17:07  03/19/20 17:07  03/19/20 17:07














Doctor's Discharge





- Discharge


Referrals: 


VIDHYA SIMONS MD [Primary Care Provider] - Follow up as needed

## 2020-03-19 NOTE — ER DOCUMENT REPORT
ED General





- General


Chief Complaint: ETOH Abuse


Stated Complaint: POSSIBLE WITHDRAWLS ETOH


Time Seen by Provider: 03/19/20 17:09


Primary Care Provider: 


VIDHYA SIMONS MD [Primary Care Provider] - Follow up as needed


Mode of Arrival: Wheelchair


Information source: Patient, Relative


TRAVEL OUTSIDE OF THE [place] IN LAST 30 DAYS: No





- HPI


Onset: Other - patient has been drinking for years and has felt bad over the 

last 12-24 hours.


Onset/Duration: Gradual


Quality of pain: Achy


Severity: Moderate


Pain Level: 1


Associated symptoms: Diarrhea, Nausea


Exacerbated by: Denies


Relieved by: Denies


Similar symptoms previously: Yes - with alcohol abuse and withdrawal


Recently seen / treated by doctor: Yes - patient was in the ICU here at [place]


Notes: 





49 year old male with a history of Alcoholism (patient drinks a pint to a 1/5th 

of alcohol a day), HTN, and GERD who was just admitted to the ICU for alcohol 

withdrawal (patient was discharged earlier in the month) here for concern of 

alcohol withdrawal. The patient says he feels sick to his stomach and he feels 

his heart is racing. The patient says he drank a pint of alcohol today.





- Related Data


Allergies/Adverse Reactions: 


                                        





lisinopril Adverse Reaction (Verified 12/31/19 17:53)


   











Past Medical History





- General


Information source: Patient





- Social History


Smoking Status: Current Every Day Smoker


Cigarette use (# per day): Yes - Pack per day


Frequency of alcohol use: Heavy - States he has been drinking a pint to a 1/5th 

a day


Drug Abuse: None


Lives with: Alone


Family History: Reviewed & Not Pertinent


Patient has suicidal ideation: No


Patient has homicidal ideation: No





- Past Medical History


Cardiac Medical History: Reports: Hx Hypertension


Pulmonary Medical History: Reports: Hx COPD


Neurological Medical History: Reports: Hx Seizures


Psychiatric Medical History: Reports: Hx Depression





Review of Systems





- Review of Systems


Constitutional: Weakness, Other - weakness


EENT: No symptoms reported


Cardiovascular: Heart racing


Respiratory: No symptoms reported


Gastrointestinal: Nausea


Genitourinary: No symptoms reported


Male Genitourinary: No symptoms reported


Musculoskeletal: No symptoms reported


Skin: Other - jaundiced


Hematologic/Lymphatic: No symptoms reported


Neurological/Psychological: No symptoms reported


-: Yes All other systems reviewed and negative





Physical Exam





- Vital signs


Vitals: 


                                        











Temp Pulse Resp BP Pulse Ox


 


 98.2 F   128 H  20   133/72 H  100 


 


 03/19/20 17:07  03/19/20 17:07  03/19/20 17:07  03/19/20 17:07  03/19/20 17:07














- Notes


Notes: 





GENERAL: Patient is jaundiced, Obese, not well appearing, in mild acute 

distress.


HEAD: Atraumatic, normocephalic.


EYES: Pupils equal round and reactive to light, extraocular movements intact, 

sclera icterus present, conjunctiva are normal.


ENT: Nares patent, oropharynx clear without exudates.  Moist mucous membranes.


NECK: Normal range of motion, supple without lymphadenopathy or JVD.


LUNGS: Breath sounds clear to auscultation bilaterally and equal.  No wheezes 

rales or rhonchi.


HEART: Tachycardic, normal rhythm without murmurs, rubs or gallops.


ABDOMEN: Soft, nontender, normoactive bowel sounds.  No guarding, no rebound.  

No masses appreciated.


EXTREMITIES: Normal range of motion, no pitting or edema.  No clubbing or 

cyanosis.


NEUROLOGICAL: Cranial nerves II through XII grossly intact.  Normal speech, 

normal gait.


PSYCH: Normal mood, normal affect.


SKIN: Warm, Dry, normal turgor, no rashes or lesions noted.





Course





- Re-evaluation


Re-evalutation: 





03/19/20 21:27


The patient is high risk for serious alcohol withdrawal. Patient was tachycardic

in the ER and he had some hypotensive events as well. Patient was bacteremic on 

his last admission earlier in the month. Patient had blood cultures obtained in 

the ER, fluids, banana bag, and ativan given prior to admission and patient felt

better after treatment.





- Vital Signs


Vital signs: 


                                        











Temp Pulse Resp BP Pulse Ox


 


 98.0 F   111 H  20   98/59 L  91 L


 


 03/19/20 20:41  03/19/20 20:41  03/19/20 17:07  03/19/20 20:41  03/19/20 20:41














- Laboratory


Result Diagrams: 


                                 03/19/20 18:08





                                 03/19/20 18:08


Laboratory results interpreted by me: 


                                        











  03/19/20 03/19/20 03/19/20





  17:18 18:08 18:08


 


WBC   14.4 H 


 


RBC   2.79 L 


 


Hgb   10.0 L 


 


Hct   28.6 L 


 


MCV   102 H 


 


MCH   35.7 H 


 


RDW   17.2 H 


 


Seg Neuts % (Manual)   81 H 


 


Band Neutrophils %   2 L 


 


Abs Neuts (Manual)   12.0 H 


 


APTT   


 


Sodium    135.4 L


 


Potassium    3.0 L*


 


Chloride    97 L


 


Calcium    7.7 L


 


Total Bilirubin    18.0 H


 


Direct Bilirubin    16.1 H


 


AST    204 H


 


ALT    66 H


 


Alkaline Phosphatase    321 H


 


Albumin    2.7 L


 


Lipase    385.6 H


 


Urine Protein  30 H  


 


Urine Bilirubin  MODERATE H  


 


Urine Urobilinogen  4.0 H  














  03/19/20





  18:56


 


WBC 


 


RBC 


 


Hgb 


 


Hct 


 


MCV 


 


MCH 


 


RDW 


 


Seg Neuts % (Manual) 


 


Band Neutrophils % 


 


Abs Neuts (Manual) 


 


APTT  40.6 H


 


Sodium 


 


Potassium 


 


Chloride 


 


Calcium 


 


Total Bilirubin 


 


Direct Bilirubin 


 


AST 


 


ALT 


 


Alkaline Phosphatase 


 


Albumin 


 


Lipase 


 


Urine Protein 


 


Urine Bilirubin 


 


Urine Urobilinogen 














- Diagnostic Test


Radiology reviewed: Image reviewed, Reports reviewed





Discharge





- Discharge


Clinical Impression: 


 Alcohol abuse





Liver failure


Qualifiers:


 Liver failure chronicity: chronic Hepatic coma status: without hepatic coma 

Qualified Code(s): K72.10 - Chronic hepatic failure without coma





Condition: Stable


Disposition: ADMITTED AS INPATIENT


Admitting Provider: Weiland (Hospitalist)


Unit Admitted: [facility]


Referrals: 


VIDHYA SIMONS MD [Primary Care Provider] - Follow up as needed

## 2020-03-20 LAB
ALBUMIN SERPL-MCNC: 2.3 G/DL (ref 3.5–5)
ALP SERPL-CCNC: 274 U/L (ref 38–126)
ANION GAP SERPL CALC-SCNC: 15 MMOL/L (ref 5–19)
AST SERPL-CCNC: 185 U/L (ref 17–59)
BILIRUB DIRECT SERPL-MCNC: 15.2 MG/DL (ref 0–0.4)
BILIRUB SERPL-MCNC: 16.7 MG/DL (ref 0.2–1.3)
BUN SERPL-MCNC: 6 MG/DL (ref 7–20)
CALCIUM: 7.1 MG/DL (ref 8.4–10.2)
CHLORIDE SERPL-SCNC: 100 MMOL/L (ref 98–107)
CO2 SERPL-SCNC: 20 MMOL/L (ref 22–30)
ERYTHROCYTE [DISTWIDTH] IN BLOOD BY AUTOMATED COUNT: 17.8 % (ref 11.5–14)
GLUCOSE SERPL-MCNC: 85 MG/DL (ref 75–110)
HCT VFR BLD CALC: 23.7 % (ref 37.9–51)
HGB BLD-MCNC: 8.2 G/DL (ref 13.5–17)
MCH RBC QN AUTO: 36.2 PG (ref 27–33.4)
MCHC RBC AUTO-ENTMCNC: 34.7 G/DL (ref 32–36)
MCV RBC AUTO: 104 FL (ref 80–97)
PLATELET # BLD: 179 10^3/UL (ref 150–450)
POTASSIUM SERPL-SCNC: 2.8 MMOL/L (ref 3.6–5)
PROT SERPL-MCNC: 5.8 G/DL (ref 6.3–8.2)
RBC # BLD AUTO: 2.28 10^6/UL (ref 4.35–5.55)
WBC # BLD AUTO: 10.8 10^3/UL (ref 4–10.5)

## 2020-03-20 RX ADMIN — FAMOTIDINE SCH MG: 20 TABLET, FILM COATED ORAL at 00:28

## 2020-03-20 RX ADMIN — Medication SCH ML: at 05:48

## 2020-03-20 RX ADMIN — DIAZEPAM SCH MG: 5 TABLET ORAL at 09:30

## 2020-03-20 RX ADMIN — Medication SCH ML: at 13:43

## 2020-03-20 RX ADMIN — HEPARIN SODIUM SCH: 5000 INJECTION, SOLUTION INTRAVENOUS; SUBCUTANEOUS at 21:38

## 2020-03-20 RX ADMIN — FOLIC ACID SCH MG: 1 TABLET ORAL at 11:14

## 2020-03-20 RX ADMIN — SODIUM CHLORIDE AND POTASSIUM CHLORIDE PRN MLS/HR: 9; 2.98 INJECTION, SOLUTION INTRAVENOUS at 11:00

## 2020-03-20 RX ADMIN — HEPARIN SODIUM SCH: 5000 INJECTION, SOLUTION INTRAVENOUS; SUBCUTANEOUS at 13:43

## 2020-03-20 RX ADMIN — Medication SCH ML: at 21:38

## 2020-03-20 RX ADMIN — FAMOTIDINE SCH MG: 20 TABLET, FILM COATED ORAL at 09:30

## 2020-03-20 RX ADMIN — HEPARIN SODIUM SCH UNIT: 5000 INJECTION, SOLUTION INTRAVENOUS; SUBCUTANEOUS at 00:26

## 2020-03-20 RX ADMIN — DIAZEPAM SCH: 5 TABLET ORAL at 00:37

## 2020-03-20 RX ADMIN — PROMETHAZINE HYDROCHLORIDE PRN MG: 25 INJECTION INTRAMUSCULAR; INTRAVENOUS at 08:06

## 2020-03-20 RX ADMIN — DIAZEPAM SCH MG: 5 TABLET ORAL at 21:38

## 2020-03-20 RX ADMIN — DIAZEPAM SCH MG: 5 TABLET ORAL at 01:45

## 2020-03-20 RX ADMIN — LACTULOSE SCH: 20 SOLUTION ORAL at 17:10

## 2020-03-20 RX ADMIN — LACTULOSE SCH: 20 SOLUTION ORAL at 13:43

## 2020-03-20 RX ADMIN — HEPARIN SODIUM SCH UNIT: 5000 INJECTION, SOLUTION INTRAVENOUS; SUBCUTANEOUS at 05:47

## 2020-03-20 RX ADMIN — FAMOTIDINE SCH MG: 20 TABLET, FILM COATED ORAL at 21:38

## 2020-03-20 RX ADMIN — DIAZEPAM SCH MG: 5 TABLET ORAL at 13:42

## 2020-03-20 RX ADMIN — DIAZEPAM SCH MG: 5 TABLET ORAL at 05:47

## 2020-03-20 RX ADMIN — Medication SCH: at 00:31

## 2020-03-20 NOTE — PDOC H&P
History of Present Illness


Admission Date/PCP: 


03/19/2020  21:28


VIDHYA SIMONS MD


Patient complains of: Alcohol withdrawal


History of Present Illness: 


HALI HARRSION is a 49 year old male who presented to the emergency room 

with acute alcohol withdrawal symptoms.  He admits a multi-decade history of 

chronic alcoholism with alcoholic cirrhosis and further admits to drinking a 

pint of bourbon after waking up this morning.  He finished his bourbon at about 

noon today and has not had any alcohol intake since.  Since that time he has 

been experiencing progressively worsening malaise, ague, nausea and diarrhea.  

He admits to numerous prior similar episodes related to alcohol withdrawal.  He 

denies other associated or accompanying signs and symptoms.  He has not 

identified any additional aggravating or ameliorating factors for his alcohol 

withdrawal symptoms.  In the emergency room he was found to have a blood alcohol

of 197 and was also noted to have a bilirubin of 18.  He was tachycardic and 

mildly hypotensive initially but improved with IV fluids.  He was also noted to 

be hypokalemic.  He was subsequently admitted to the hospital for further 

evaluation and acute detoxification treatment.





Past Medical History


Cardiac Medical History: Reports: Hypertension


   Denies: Atrial Fibrillation, Congestive Heart Failure, Coronary Artery Di

sease, DVT, Myocardial Infarction, Hyperlipidema, Pulmonary Embolism


Pulmonary Medical History: Reports: Chronic Obstructive Pulmonary Disease 

(COPD), Respiratory Failure


   Denies: Asthma


EENT Medical History: 


   Denies: Cataracts, Ears - Hearing aids


Neurological Medical History: Reports: Seizures


   Denies: Hemorrhagic CVA, Ischemic CVA


Endocrine Medical History: Reports: Obesity


   Denies: Diabetes Mellitus Type 1, Diabetes Mellitus Type 2, Hyperthyroidism, 

Hypothyroidism


Renal/ Medical History: 


   Denies: Chronic Kidney Disease, Nephrolithiasis


Malignancy Medical History: Reports: None


GI Medical History: Reports: Cirrhosis - Chronic alcoholic cirrhosis, 

Gastroesophageal Reflux Disease


   Denies: Crohn's Disease, Hepatitis, Ulcerative Colitis


Musculoskeltal Medical History: 


   Denies: Arthritis, Gout


Skin Medical History: 


   Denies: Eczema, Psoriasis


Psychiatric Medical History: Reports: Alcohol Dependency, Depression, Substance 

Abuse, Tobacco Dependency


Traumatic Medical History: Reports: None


Hematology: Reports: Anemia - Chronic, Bleeding Tendencies - Excessive bleeding 

when injury


Infectious Medical History: Reports: None





Past Surgical History


Past Surgical History: Reports: None





Social History


Information Source: Patient


Lives with: Parents


Smoking Status: Current Every Day Smoker


Electronic Cigarette use?: No


Frequency of Alcohol Use: Heavy - 16 to 24 ounces of liquor daily


Hx Recreational Drug Use: Yes


Drugs: Cocaine, Marijuana, Hallucinogen


Hx Prescription Drug Abuse: No





- Advance Directive


Resuscitation Status: Full Code


Surrogate healthcare decision maker:: 


Venkatesh Harrison





Family History


Family History: CAD, Hypertension.  denies: DM, Malignancy


Parental Family History Reviewed: Yes


Children Family History Reviewed: No


Sibling(s) Family History Reviewed.: Yes





Medication/Allergy


Home Medications: 








Amlodipine Besylate [Norvasc 10 mg Tablet] 10 mg PO DAILY 12/31/19 


Atenolol 100 mg PO DAILY 12/31/19 


Albuterol Sulfate [Albuterol Sulfate Hfa] 2 puff IH Q4HP PRN 02/18/20 


Propranolol HCl [Inderal 10 mg Tablet] 10 mg PO QHS 02/18/20 


Sertraline HCl [Zoloft 50 mg Tablet] 50 mg PO QHS 02/18/20 


Cephalexin Monohydrate [Keflex 250 mg Capsule] 250 mg PO QID #24 cap 03/04/20 


Lactulose [Cephulac Syrup 20 gm/30 ml Udcup] 20 gm PO DAILY 30 Days #1000 ml 

03/04/20 


Methylprednisolone [Methylpred Dp] 4 mg PO ASDIR #1 tab.ds.pk 03/04/20 


Pantoprazole Sodium [Protonix 40 mg Dr Tablet] 40 mg PO DAILY #14 tablet.dr 

03/04/20 


Potassium Chloride 20 meq PO DAILY #14 tablet.er 03/04/20 


Sucralfate [Carafate 1 gm Tablet] 1 gm PO ACHS #60 tablet 03/04/20 








Allergies/Adverse Reactions: 


                                        





lisinopril Adverse Reaction (Verified 12/31/19 17:53)


   











Review of Systems


Constitutional: PRESENT: as per HPI, other - Malaise and ague.  ABSENT: chills, 

fever(s)


Eyes: ABSENT: visual disturbances, other - Eye pain


Ears: ABSENT: hearing changes, other - Ear pain


Nose, Mouth, and Throat: ABSENT: headache(s), sore throat


Cardiovascular: ABSENT: chest pain, palpitations


Respiratory: ABSENT: cough, dyspnea


Gastrointestinal: PRESENT: as per HPI, diarrhea, nausea.  ABSENT: abdominal 

pain, constipation, vomiting


Genitourinary: PRESENT: other - Dark-colored urine.  ABSENT: dysuria, hematuria


Musculoskeletal: ABSENT: back pain, joint swelling


Integumentary: ABSENT: pruritus, rash


Neurological: ABSENT: confusion, convulsions, focal weakness, memory loss, 

syncope


Psychiatric: ABSENT: anxiety, depression


Endocrine: ABSENT: cold intolerance, heat intolerance, polydipsia, polyphagia, 

polyuria


Hematologic/Lymphatic: PRESENT: easy bleeding, easy bruising


Allergic/Immunologic: ABSENT: seasonal rhinorrhea





Physical Exam


Vital Signs: 


                                        











Temp Pulse Resp BP Pulse Ox


 


 98.0 F   111 H  20   98/59 L  91 L


 


 03/19/20 20:41  03/19/20 20:41  03/19/20 17:07  03/19/20 20:41  03/19/20 20:41








                                 Intake & Output











 03/17/20 03/18/20 03/19/20





 23:59 23:59 23:59


 


Intake Total   967


 


Balance   967


 


Weight   106.8 kg











General appearance: PRESENT: no acute distress, cooperative, obese, other - 

Jaundiced


Head exam: PRESENT: atraumatic, normocephalic


Eye exam: PRESENT: scleral icterus.  ABSENT: conjunctival injection


Ear exam: PRESENT: normal external ear exam.  ABSENT: bleeding, drainage


Mouth exam: PRESENT: dry mucosa, neck supple


Neck exam: ABSENT: thyromegaly, tracheal deviation


Respiratory exam: PRESENT: decreased breath sounds - Mildly decreased breath 

sounds throughout all fields, prolonged expiratory phas - Mildly prolonged 

expiratory phase noted in all fields, symmetrical, unlabored, wheezes - Mild 

expiratory wheezes present in all fields


Cardiovascular exam: PRESENT: RRR, tachycardia.  ABSENT: clicks, gallop, rubs


Pulses: PRESENT: normal radial pulses, normal dorsalis pedis pul


Vascular exam: PRESENT: normal capillary refill.  ABSENT: pallor


GI/Abdominal exam: PRESENT: normal bowel sounds, soft.  ABSENT: distended, 

tenderness


Rectal exam: PRESENT: deferred


Extremities exam: ABSENT: joint swelling, pedal edema


Musculoskeletal exam: ABSENT: deformity, dislocation


Neurological exam: PRESENT: alert, oriented to person, oriented to place, 

oriented to time, oriented to situation, CN II-XII grossly intact - Mild 

generalized tremor noted, other


Psychiatric exam: PRESENT: anxious, normal mood


Skin exam: PRESENT: dry, intact, jaundice, warm.  ABSENT: rash, urticaria





Results


Laboratory Results: 


                                        





                                 03/19/20 18:08 





                                 03/19/20 18:08 





                                        











  03/19/20 03/19/20 03/19/20





  17:18 18:08 18:08


 


WBC   14.4 H 


 


RBC   2.79 L 


 


Hgb   10.0 L 


 


Hct   28.6 L 


 


MCV   102 H 


 


MCH   35.7 H 


 


MCHC   34.9 


 


RDW   17.2 H 


 


Plt Count   193 


 


Seg Neutrophils %   Not Reportable 


 


Sodium    135.4 L


 


Potassium    3.0 L*


 


Chloride    97 L


 


Carbon Dioxide    25


 


Anion Gap    13


 


BUN    7


 


Creatinine    0.64


 


Est GFR ( Amer)    > 60


 


Glucose    91


 


Calcium    7.7 L


 


Magnesium   


 


Total Bilirubin    18.0 H


 


AST    204 H


 


Alkaline Phosphatase    321 H


 


Total Protein    6.3


 


Albumin    2.7 L


 


Lipase    385.6 H


 


Urine Color  YOSHI  


 


Urine Appearance  SLIGHTLY-CLOUDY  


 


Urine pH  6.0  


 


Ur Specific Gravity  1.017  


 


Urine Protein  30 H  


 


Urine Glucose (UA)  NEGATIVE  


 


Urine Ketones  NEGATIVE  


 


Urine Blood  NEGATIVE  


 


Urine RBC (Auto)  0  














  03/19/20





  18:08


 


WBC 


 


RBC 


 


Hgb 


 


Hct 


 


MCV 


 


MCH 


 


MCHC 


 


RDW 


 


Plt Count 


 


Seg Neutrophils % 


 


Sodium 


 


Potassium 


 


Chloride 


 


Carbon Dioxide 


 


Anion Gap 


 


BUN 


 


Creatinine 


 


Est GFR (African Amer) 


 


Glucose 


 


Calcium 


 


Magnesium  1.9


 


Total Bilirubin 


 


AST 


 


Alkaline Phosphatase 


 


Total Protein 


 


Albumin 


 


Lipase 


 


Urine Color 


 


Urine Appearance 


 


Urine pH 


 


Ur Specific Gravity 


 


Urine Protein 


 


Urine Glucose (UA) 


 


Urine Ketones 


 


Urine Blood 


 


Urine RBC (Auto) 














Assessment and Plan





- Diagnosis


(1) Alcohol withdrawal


Qualifiers: 


   Complication of substance-induced condition: with delirium   Qualified 

Code(s): F10.231 - Alcohol dependence with withdrawal delirium   


Is this a current diagnosis for this admission?: Yes   





(2) Hypokalemia


Is this a current diagnosis for this admission?: Yes   





(3) Laennec's cirrhosis (alcoholic)


Qualifiers: 


   Ascites presence: without ascites   Qualified Code(s): K70.30 - Alcoholic 

cirrhosis of liver without ascites   


Is this a current diagnosis for this admission?: Yes   





(4) Alcohol use disorder, severe, dependence


Is this a current diagnosis for this admission?: Yes   





(5) Tobacco use disorder, severe, dependence


Is this a current diagnosis for this admission?: Yes   





(6) Polysubstance abuse


Is this a current diagnosis for this admission?: Yes   





- Plan Summary


Summary: 


Patient will be admitted to the [facility] where he will receive routine supportive 

and symptomatic cares.  He will receive IV fluids with supplemental potassium.  

He will receive Valium 5 mg p.o. every 4 hours and will also have Valium 10 mg 

IV every hour as needed severe tremor/agitation/anxiety.  He will use Thorazine 

25 mg IV every 8 hours as needed extreme agitation or hallucinations.  He will 

be continued on his usual medications for his chronic medical problems as 

appropriate.  CBCs, metabolic profiles and magnesium levels will be obtained as 

appropriate.  A nutritionist consultation will be obtained for appropriate diet 

management.





- Time


Time Spent with patient: 15-24 minutes


Smoking Cessation Education: 3 to 10 minutes


Medications reviewed and adjusted accordingly: Yes


Anticipated discharge: Home





- Inpatient Certification


Based on my medical assessment, after consideration of the patient's 

comorbidities, presenting symptoms, or acuity I expect that the services needed 

warrant INPATIENT care.: Yes


I certify that my determination is in accordance with my understanding of 

Medicare's requirements for reasonable and necessary INPATIENT services [42 CFR 

412.3e].: Yes


Medical Necessity: Significant Comorbidiites Make Outpatient Treatment Too 

Risky, Need Close Monitoring Due to Risk of Patient Decompensation, Need For 

Continuous Telemetry Monitoring, Need for Neurological Checks, Risk of 

Complication if Not Cared For in Hospital

## 2020-03-20 NOTE — PDOC PROGRESS REPORT
Subjective


Progress Note for:: 03/20/20


Subjective:: 


HALI HARRISON is a 49 year old male who presented to the emergency room 

with acute alcohol withdrawal symptoms.  He admits a multi-decade history of 

chronic alcoholism with alcoholic cirrhosis and further admits to drinking a 

pint of bourbon after waking up this morning.  He finished his bourbon at about 

noon today and has not had any alcohol intake since.  Since that time he has 

been experiencing progressively worsening malaise, ague, nausea and diarrhea.  YARELI

e admits to numerous prior similar episodes related to alcohol withdrawal.  He 

denies other associated or accompanying signs and symptoms.  He has not 

identified any additional aggravating or ameliorating factors for his alcohol 

withdrawal symptoms.  In the emergency room he was found to have a blood alcohol

of 197 and was also noted to have a bilirubin of 18.  He was tachycardic and 

mildly hypotensive initially but improved with IV fluids.  He was also noted to 

be hypokalemic.  He was subsequently admitted to the hospital for further 

evaluation and acute detoxification treatment.





3/20/2020.  Saw patient this morning, sitting in bed enjoying his breakfast, no 

apparent distress, alert and oriented, complaining of mild anxiety, denies any 

fever, chills, nausea, vomiting, diarrhea.


Reason For Visit: 


ACUTE ALCOHOL WITHDRAWAL








Physical Exam


Vital Signs: 


                                        











Temp Pulse Resp BP Pulse Ox


 


 98.3 F   108 H  18   108/59 L  98 


 


 03/20/20 07:22  03/20/20 07:22  03/20/20 07:22  03/20/20 07:22  03/20/20 07:22








                                 Intake & Output











 03/19/20 03/20/20 03/21/20





 06:59 06:59 06:59


 


Intake Total  2990 1000


 


Output Total  250 


 


Balance  2740 1000


 


Weight  111.1 kg 111.1 kg











General appearance: PRESENT: obese, other - Diffuse jaundice


Head exam: PRESENT: atraumatic, normocephalic


Respiratory exam: PRESENT: clear to auscultation cesar.  ABSENT: rales, rhonchi, 

wheezes


Cardiovascular exam: PRESENT: RRR.  ABSENT: diastolic murmur, rubs, systolic 

murmur


GI/Abdominal exam: PRESENT: distended, normal bowel sounds, soft.  ABSENT: 

guarding, mass, organolmegaly, rebound, tenderness


Neurological exam: PRESENT: alert, awake, oriented to person, oriented to place,

CN II-XII grossly intact.  ABSENT: motor sensory deficit


Skin exam: PRESENT: jaundice





Results


Laboratory Results: 


                                        





                                 03/20/20 05:29 





                                 03/20/20 05:29 





                                        











  03/19/20 03/19/20 03/19/20





  17:18 18:08 18:08


 


WBC   14.4 H 


 


RBC   2.79 L 


 


Hgb   10.0 L 


 


Hct   28.6 L 


 


MCV   102 H 


 


MCH   35.7 H 


 


MCHC   34.9 


 


RDW   17.2 H 


 


Plt Count   193 


 


Seg Neutrophils %   Not Reportable 


 


Sodium    135.4 L


 


Potassium    3.0 L*


 


Chloride    97 L


 


Carbon Dioxide    25


 


Anion Gap    13


 


BUN    7


 


Creatinine    0.64


 


Est GFR ( Amer)    > 60


 


Glucose    91


 


Calcium    7.7 L


 


Magnesium   


 


Total Bilirubin    18.0 H


 


AST    204 H


 


Alkaline Phosphatase    321 H


 


Ammonia   


 


Total Protein    6.3


 


Albumin    2.7 L


 


Lipase    385.6 H


 


Urine Color  YOSHI  


 


Urine Appearance  SLIGHTLY-CLOUDY  


 


Urine pH  6.0  


 


Ur Specific Gravity  1.017  


 


Urine Protein  30 H  


 


Urine Glucose (UA)  NEGATIVE  


 


Urine Ketones  NEGATIVE  


 


Urine Blood  NEGATIVE  


 


Urine RBC (Auto)  0  














  03/19/20 03/20/20 03/20/20





  18:08 05:29 05:29


 


WBC   10.8 H 


 


RBC   2.28 L 


 


Hgb   8.2 L 


 


Hct   23.7 L 


 


MCV   104 H 


 


MCH   36.2 H 


 


MCHC   34.7 


 


RDW   17.8 H 


 


Plt Count   179 


 


Seg Neutrophils %   


 


Sodium    135.1 L


 


Potassium    2.8 L*


 


Chloride    100


 


Carbon Dioxide    20 L


 


Anion Gap    15


 


BUN    6 L


 


Creatinine    0.50 L


 


Est GFR ( Amer)    > 60


 


Glucose    85


 


Calcium    7.1 L


 


Magnesium  1.9   1.9


 


Total Bilirubin    16.7 H


 


AST    185 H


 


Alkaline Phosphatase    274 H


 


Ammonia   


 


Total Protein    5.8 L


 


Albumin    2.3 L


 


Lipase   


 


Urine Color   


 


Urine Appearance   


 


Urine pH   


 


Ur Specific Gravity   


 


Urine Protein   


 


Urine Glucose (UA)   


 


Urine Ketones   


 


Urine Blood   


 


Urine RBC (Auto)   














  03/20/20





  05:29


 


WBC 


 


RBC 


 


Hgb 


 


Hct 


 


MCV 


 


MCH 


 


MCHC 


 


RDW 


 


Plt Count 


 


Seg Neutrophils % 


 


Sodium 


 


Potassium 


 


Chloride 


 


Carbon Dioxide 


 


Anion Gap 


 


BUN 


 


Creatinine 


 


Est GFR (African Amer) 


 


Glucose 


 


Calcium 


 


Magnesium 


 


Total Bilirubin 


 


AST 


 


Alkaline Phosphatase 


 


Ammonia  24.5


 


Total Protein 


 


Albumin 


 


Lipase 


 


Urine Color 


 


Urine Appearance 


 


Urine pH 


 


Ur Specific Gravity 


 


Urine Protein 


 


Urine Glucose (UA) 


 


Urine Ketones 


 


Urine Blood 


 


Urine RBC (Auto) 











Impressions: 


                                        





Chest X-Ray  03/19/20 18:31


IMPRESSION:


 


Significantly elevated right hemidiaphragm with suspected small


right pleural effusion/right basilar opacity. Consider


superimposed atelectasis or pneumonia to include aspiration.


 


 


copyright 2011 [employer]- All Rights Reserved


 














Assessment and Plan





- Diagnosis


(1) Alcohol intoxication


Qualifiers: 


   Complication of substance-induced condition: uncomplicated   Qualified 

Code(s): F10.920 - Alcohol use, unspecified with intoxication, uncomplicated   


Is this a current diagnosis for this admission?: Yes   


Plan: 


Denies any visual or auditory hallucination.  Complains of mild anxiety.  


Continue DT protocol.


Implement fall, seizure and aspiration precautions.








(2) Alcoholic cirrhosis


Qualifiers: 


   Ascites presence: unspecified   Qualified Code(s): K70.30 - Alcoholic cirrho

sis of liver without ascites   


Is this a current diagnosis for this admission?: Yes   


Plan: 


Severely jaundiced with elevated T bili, AST, ALT.





Afebrile.  WBC WNL.  Denies any abdominal pain.  





Monitor volume status, monitor for bleeding, monitor for seizure.





Continue Lasix, spironolactone and lactulose.





Outpatient PCP and gastroenterology follow-up.








(3) Alcoholic hepatitis


Qualifiers: 


   Ascites presence: without ascites   Qualified Code(s): K70.10 - Alcoholic 

hepatitis without ascites   


Is this a current diagnosis for this admission?: Yes   


Plan: 


As above.








(4) Tobacco use disorder, severe, dependence


Is this a current diagnosis for this admission?: Yes   


Plan: 


Counseled on quitting.


NicoDerm patch will be provided.








(5) Hypokalemia


Is this a current diagnosis for this admission?: Yes   


Plan: 


Likely due to low p.o. intake.


No acute EKG changes.


Continue supplemental potassium.  Potassium level tomorrow.








(6) Polysubstance abuse


Is this a current diagnosis for this admission?: Yes   


Plan: 


UDS positive for benzodiazepines.


Monitor for withdrawal.  Monitor vitals.








- Plan Summary


Summary: 


Patient will be admitted to the [facility] where he will receive routine supportive 

and symptomatic cares.  He will receive IV fluids with supplemental potassium.  

He will receive Valium 5 mg p.o. every 4 hours and will also have Valium 10 mg 

IV every hour as needed severe tremor/agitation/anxiety.  He will use Thorazine 

25 mg IV every 8 hours as needed extreme agitation or hallucinations.  He will 

be continued on his usual medications for his chronic medical problems as 

appropriate.  CBCs, metabolic profiles and magnesium levels will be obtained as 

appropriate.  A nutritionist consultation will be obtained for appropriate diet 

management.

## 2020-03-21 LAB
ANION GAP SERPL CALC-SCNC: 7 MMOL/L (ref 5–19)
BUN SERPL-MCNC: 4 MG/DL (ref 7–20)
CALCIUM: 7.2 MG/DL (ref 8.4–10.2)
CHLORIDE SERPL-SCNC: 102 MMOL/L (ref 98–107)
CO2 SERPL-SCNC: 25 MMOL/L (ref 22–30)
ERYTHROCYTE [DISTWIDTH] IN BLOOD BY AUTOMATED COUNT: 17.5 % (ref 11.5–14)
GLUCOSE SERPL-MCNC: 83 MG/DL (ref 75–110)
HCT VFR BLD CALC: 23 % (ref 37.9–51)
HGB BLD-MCNC: 8 G/DL (ref 13.5–17)
MCH RBC QN AUTO: 36 PG (ref 27–33.4)
MCHC RBC AUTO-ENTMCNC: 34.7 G/DL (ref 32–36)
MCV RBC AUTO: 104 FL (ref 80–97)
PLATELET # BLD: 160 10^3/UL (ref 150–450)
POTASSIUM SERPL-SCNC: 2.8 MMOL/L (ref 3.6–5)
POTASSIUM SERPL-SCNC: 3 MMOL/L (ref 3.6–5)
RBC # BLD AUTO: 2.21 10^6/UL (ref 4.35–5.55)
WBC # BLD AUTO: 10.9 10^3/UL (ref 4–10.5)

## 2020-03-21 RX ADMIN — DIAZEPAM SCH MG: 5 TABLET ORAL at 05:16

## 2020-03-21 RX ADMIN — Medication SCH ML: at 13:33

## 2020-03-21 RX ADMIN — SODIUM CHLORIDE AND POTASSIUM CHLORIDE PRN MLS/HR: 9; 2.98 INJECTION, SOLUTION INTRAVENOUS at 03:22

## 2020-03-21 RX ADMIN — FOLIC ACID SCH MG: 1 TABLET ORAL at 09:33

## 2020-03-21 RX ADMIN — DEXTROSE, SODIUM CHLORIDE, AND POTASSIUM CHLORIDE PRN MLS/HR: 5; .45; .3 INJECTION INTRAVENOUS at 22:37

## 2020-03-21 RX ADMIN — FAMOTIDINE SCH MG: 20 TABLET, FILM COATED ORAL at 21:31

## 2020-03-21 RX ADMIN — LACTULOSE SCH GM: 20 SOLUTION ORAL at 09:33

## 2020-03-21 RX ADMIN — DEXTROSE, SODIUM CHLORIDE, AND POTASSIUM CHLORIDE PRN MLS/HR: 5; .45; .3 INJECTION INTRAVENOUS at 21:32

## 2020-03-21 RX ADMIN — LACTULOSE SCH GM: 20 SOLUTION ORAL at 13:33

## 2020-03-21 RX ADMIN — DIAZEPAM SCH MG: 5 TABLET ORAL at 21:30

## 2020-03-21 RX ADMIN — Medication SCH: at 05:16

## 2020-03-21 RX ADMIN — HEPARIN SODIUM SCH: 5000 INJECTION, SOLUTION INTRAVENOUS; SUBCUTANEOUS at 21:30

## 2020-03-21 RX ADMIN — PROMETHAZINE HYDROCHLORIDE PRN MG: 25 INJECTION INTRAMUSCULAR; INTRAVENOUS at 01:30

## 2020-03-21 RX ADMIN — FAMOTIDINE SCH MG: 20 TABLET, FILM COATED ORAL at 09:33

## 2020-03-21 RX ADMIN — HEPARIN SODIUM SCH: 5000 INJECTION, SOLUTION INTRAVENOUS; SUBCUTANEOUS at 05:16

## 2020-03-21 RX ADMIN — LORAZEPAM PRN MG: 2 INJECTION INTRAMUSCULAR; INTRAVENOUS at 11:32

## 2020-03-21 RX ADMIN — LACTULOSE SCH GM: 20 SOLUTION ORAL at 17:12

## 2020-03-21 RX ADMIN — DIAZEPAM SCH MG: 5 TABLET ORAL at 13:33

## 2020-03-21 RX ADMIN — HEPARIN SODIUM SCH: 5000 INJECTION, SOLUTION INTRAVENOUS; SUBCUTANEOUS at 13:33

## 2020-03-21 RX ADMIN — LORAZEPAM PRN MG: 2 INJECTION INTRAMUSCULAR; INTRAVENOUS at 22:37

## 2020-03-21 RX ADMIN — Medication SCH ML: at 21:31

## 2020-03-21 NOTE — PDOC PROGRESS REPORT
Subjective


Progress Note for:: 03/21/20


Subjective:: 


HALI HARRISON is a 49 year old male who presented to the emergency room 

with acute alcohol withdrawal symptoms.  He admits a multi-decade history of 

chronic alcoholism with alcoholic cirrhosis and further admits to drinking a 

pint of bourbon after waking up this morning.  He finished his bourbon at about 

noon today and has not had any alcohol intake since.  Since that time he has 

been experiencing progressively worsening malaise, ague, nausea and diarrhea.  H

e admits to numerous prior similar episodes related to alcohol withdrawal.  He 

denies other associated or accompanying signs and symptoms.  He has not 

identified any additional aggravating or ameliorating factors for his alcohol 

withdrawal symptoms.  In the emergency room he was found to have a blood alcohol

of 197 and was also noted to have a bilirubin of 18.  He was tachycardic and 

mildly hypotensive initially but improved with IV fluids.  He was also noted to 

be hypokalemic.  He was subsequently admitted to the hospital for further 

evaluation and acute detoxification treatment.





3/20/2020.  Saw patient this morning, sitting in bed enjoying his breakfast, no 

apparent distress, alert and oriented, complaining of mild anxiety, denies any 

fever, chills, nausea, vomiting, diarrhea.





3/21/2020.  No acute events overnight.  Resting in bed no apparent distress, 

denies any fever, chills, nausea, vomiting, diarrhea, constipation or any 

urinary symptoms.  Denies any anxiety, visual or auditory hallucinations.


Reason For Visit: 


ACUTE ALCOHOL WITHDRAWAL








Physical Exam


Vital Signs: 


                                        











Temp Pulse Resp BP Pulse Ox


 


 98.3 F   104 H  18   131/67 H  99 


 


 03/21/20 08:16  03/21/20 08:16  03/21/20 08:16  03/21/20 08:16  03/21/20 08:16








                                 Intake & Output











 03/20/20 03/21/20 03/22/20





 06:59 06:59 06:59


 


Intake Total 2990 4186 


 


Output Total 250 925 


 


Balance 2740 3261 


 


Weight 111.1 kg 110.6 kg 











General appearance: PRESENT: no acute distress, obese, well-developed, well-

nourished


Head exam: PRESENT: atraumatic, normocephalic


Respiratory exam: PRESENT: clear to auscultation cesar.  ABSENT: rales, rhonchi, 

wheezes


Cardiovascular exam: PRESENT: RRR.  ABSENT: diastolic murmur, rubs, systolic 

murmur


GI/Abdominal exam: PRESENT: normal bowel sounds, soft.  ABSENT: distended, 

guarding, mass, organolmegaly, rebound, tenderness


Neurological exam: PRESENT: alert, awake, oriented to person, oriented to place,

oriented to time, oriented to situation, CN II-XII grossly intact.  ABSENT: 

motor sensory deficit





Results


Laboratory Results: 


                                        





                                 03/21/20 05:14 





                                 03/21/20 05:14 





                                        











  03/20/20 03/20/20 03/20/20





  13:45 17:52 17:52


 


WBC   


 


RBC   


 


Hgb   


 


Hct   


 


MCV   


 


MCH   


 


MCHC   


 


RDW   


 


Plt Count   


 


Sodium   


 


Potassium  3.0 L*  3.0 L* 


 


Chloride   


 


Carbon Dioxide   


 


Anion Gap   


 


BUN   


 


Creatinine   


 


Est GFR (African Amer)   


 


Glucose   


 


Calcium   


 


Magnesium   


 


Ammonia    36.7 H














  03/21/20 03/21/20 03/21/20





  05:14 05:14 05:14


 


WBC  10.9 H  


 


RBC  2.21 L  


 


Hgb  8.0 L  


 


Hct  23.0 L  


 


MCV  104 H  


 


MCH  36.0 H  


 


MCHC  34.7  


 


RDW  17.5 H  


 


Plt Count  160  


 


Sodium    133.7 L


 


Potassium    2.8 L*


 


Chloride    102


 


Carbon Dioxide    25


 


Anion Gap    7


 


BUN    4 L


 


Creatinine    0.58


 


Est GFR ( Amer)    > 60


 


Glucose    83


 


Calcium    7.2 L


 


Magnesium   1.7 


 


Ammonia   











Impressions: 


                                        





Chest X-Ray  03/19/20 18:31


IMPRESSION:


 


Significantly elevated right hemidiaphragm with suspected small


right pleural effusion/right basilar opacity. Consider


superimposed atelectasis or pneumonia to include aspiration.


 


 


copyright 2011 [employer]- All Rights Reserved


 














Assessment and Plan





- Diagnosis


(1) Hypokalemia


Is this a current diagnosis for this admission?: Yes   


Plan: 


Likely due to low p.o. intake.


No acute EKG changes.


Continue supplemental potassium.  Potassium level tomorrow.








(2) Alcohol intoxication


Qualifiers: 


   Complication of substance-induced condition: uncomplicated   Qualified 

Code(s): F10.920 - Alcohol use, unspecified with intoxication, uncomplicated   


Is this a current diagnosis for this admission?: Yes   


Plan: 


Denies any visual or auditory hallucination.   


Continue DT protocol.


Implement fall, seizure and aspiration precautions.








(3) Alcoholic cirrhosis


Qualifiers: 


   Ascites presence: unspecified   Qualified Code(s): K70.30 - Alcoholic 

cirrhosis of liver without ascites   


Is this a current diagnosis for this admission?: Yes   


Plan: 


Severely jaundiced with elevated T bili, AST, ALT.





Afebrile.  WBC WNL.  Denies any abdominal pain.  





Monitor volume status, monitor for bleeding, monitor for seizure.





Continue Lasix, spironolactone and lactulose.





Outpatient PCP and gastroenterology follow-up.








(4) Alcoholic hepatitis


Qualifiers: 


   Ascites presence: without ascites   Qualified Code(s): K70.10 - Alcoholic 

hepatitis without ascites   


Is this a current diagnosis for this admission?: Yes   


Plan: 


As above.








(5) Tobacco use disorder, severe, dependence


Is this a current diagnosis for this admission?: Yes   


Plan: 


Counseled on quitting.


NicoDerm patch will be provided.








(6) Polysubstance abuse


Is this a current diagnosis for this admission?: Yes   


Plan: 


UDS positive for benzodiazepines.


Monitor for withdrawal.  Monitor vitals.








- Plan Summary


Summary: 


Patient will be admitted to the [facility] where he will receive routine supportive 

and symptomatic cares.  He will receive IV fluids with supplemental potassium.  

He will receive Valium 5 mg p.o. every 4 hours and will also have Valium 10 mg 

IV every hour as needed severe tremor/agitation/anxiety.  He will use Thorazine 

25 mg IV every 8 hours as needed extreme agitation or hallucinations.  He will 

be continued on his usual medications for his chronic medical problems as 

appropriate.  CBCs, metabolic profiles and magnesium levels will be obtained as 

appropriate.  A nutritionist consultation will be obtained for appropriate diet 

management.

## 2020-03-22 LAB
ABSOLUTE LYMPHOCYTES# (MANUAL): 1.8 10^3/UL (ref 0.5–4.7)
ABSOLUTE MONOCYTES # (MANUAL): 0.4 10^3/UL (ref 0.1–1.4)
ADD MANUAL DIFF: YES
ALBUMIN SERPL-MCNC: 2.4 G/DL (ref 3.5–5)
ALP SERPL-CCNC: 287 U/L (ref 38–126)
ANION GAP SERPL CALC-SCNC: 7 MMOL/L (ref 5–19)
ANISOCYTOSIS BLD QL SMEAR: (no result)
AST SERPL-CCNC: 157 U/L (ref 17–59)
BASOPHILS NFR BLD MANUAL: 0 % (ref 0–2)
BILIRUB DIRECT SERPL-MCNC: 18.8 MG/DL (ref 0–0.4)
BILIRUB SERPL-MCNC: 21 MG/DL (ref 0.2–1.3)
BUN SERPL-MCNC: 3 MG/DL (ref 7–20)
CALCIUM: 7.5 MG/DL (ref 8.4–10.2)
CHLORIDE SERPL-SCNC: 101 MMOL/L (ref 98–107)
CO2 SERPL-SCNC: 26 MMOL/L (ref 22–30)
EOSINOPHIL NFR BLD MANUAL: 1 % (ref 0–6)
ERYTHROCYTE [DISTWIDTH] IN BLOOD BY AUTOMATED COUNT: 18 % (ref 11.5–14)
GLUCOSE SERPL-MCNC: 80 MG/DL (ref 75–110)
HCT VFR BLD CALC: 25.9 % (ref 37.9–51)
HGB BLD-MCNC: 8.7 G/DL (ref 13.5–17)
MACROCYTES BLD QL SMEAR: (no result)
MCH RBC QN AUTO: 35.6 PG (ref 27–33.4)
MCHC RBC AUTO-ENTMCNC: 33.5 G/DL (ref 32–36)
MCV RBC AUTO: 106 FL (ref 80–97)
MONOCYTES % (MANUAL): 3 % (ref 3–13)
NEUTS BAND NFR BLD MANUAL: 1 % (ref 3–5)
PLATELET # BLD: 201 10^3/UL (ref 150–450)
PLATELET COMMENT: ADEQUATE
POLYCHROMASIA BLD QL SMEAR: (no result)
POTASSIUM SERPL-SCNC: 3.3 MMOL/L (ref 3.6–5)
PROT SERPL-MCNC: 6.1 G/DL (ref 6.3–8.2)
RBC # BLD AUTO: 2.44 10^6/UL (ref 4.35–5.55)
SEGMENTED NEUTROPHILS % (MAN): 80 % (ref 42–78)
TOTAL CELLS COUNTED BLD: 100
VARIANT LYMPHS NFR BLD MANUAL: 15 % (ref 13–45)
WBC # BLD AUTO: 11.9 10^3/UL (ref 4–10.5)
WBC TOXIC VACUOLES BLD QL SMEAR: PRESENT

## 2020-03-22 RX ADMIN — LACTULOSE SCH GM: 20 SOLUTION ORAL at 14:51

## 2020-03-22 RX ADMIN — HEPARIN SODIUM SCH UNIT: 5000 INJECTION, SOLUTION INTRAVENOUS; SUBCUTANEOUS at 14:51

## 2020-03-22 RX ADMIN — HEPARIN SODIUM SCH: 5000 INJECTION, SOLUTION INTRAVENOUS; SUBCUTANEOUS at 21:59

## 2020-03-22 RX ADMIN — HEPARIN SODIUM SCH: 5000 INJECTION, SOLUTION INTRAVENOUS; SUBCUTANEOUS at 05:43

## 2020-03-22 RX ADMIN — Medication SCH ML: at 14:52

## 2020-03-22 RX ADMIN — FAMOTIDINE SCH MG: 20 TABLET, FILM COATED ORAL at 11:00

## 2020-03-22 RX ADMIN — LORAZEPAM PRN MG: 2 INJECTION INTRAMUSCULAR; INTRAVENOUS at 02:28

## 2020-03-22 RX ADMIN — FAMOTIDINE SCH MG: 20 TABLET, FILM COATED ORAL at 22:03

## 2020-03-22 RX ADMIN — DIAZEPAM SCH MG: 5 TABLET ORAL at 14:51

## 2020-03-22 RX ADMIN — Medication SCH ML: at 05:42

## 2020-03-22 RX ADMIN — LEVALBUTEROL HYDROCHLORIDE PRN MG: 0.63 SOLUTION RESPIRATORY (INHALATION) at 12:22

## 2020-03-22 RX ADMIN — DIAZEPAM SCH MG: 5 TABLET ORAL at 22:03

## 2020-03-22 RX ADMIN — POTASSIUM CHLORIDE SCH MEQ: 750 TABLET, FILM COATED, EXTENDED RELEASE ORAL at 11:00

## 2020-03-22 RX ADMIN — FOLIC ACID SCH MG: 1 TABLET ORAL at 11:00

## 2020-03-22 RX ADMIN — RIFAXIMIN SCH MG: 550 TABLET ORAL at 18:01

## 2020-03-22 RX ADMIN — LACTULOSE SCH GM: 20 SOLUTION ORAL at 11:00

## 2020-03-22 RX ADMIN — DIAZEPAM SCH MG: 5 TABLET ORAL at 05:42

## 2020-03-22 RX ADMIN — FUROSEMIDE SCH MG: 10 INJECTION, SOLUTION INTRAMUSCULAR; INTRAVENOUS at 17:48

## 2020-03-22 RX ADMIN — Medication SCH ML: at 22:04

## 2020-03-22 RX ADMIN — SPIRONOLACTONE SCH MG: 25 TABLET, FILM COATED ORAL at 12:44

## 2020-03-22 RX ADMIN — LORAZEPAM PRN MG: 2 INJECTION INTRAMUSCULAR; INTRAVENOUS at 17:46

## 2020-03-22 RX ADMIN — LEVALBUTEROL HYDROCHLORIDE PRN MG: 0.63 SOLUTION RESPIRATORY (INHALATION) at 20:01

## 2020-03-22 RX ADMIN — POTASSIUM CHLORIDE SCH MEQ: 750 TABLET, FILM COATED, EXTENDED RELEASE ORAL at 17:53

## 2020-03-22 NOTE — PDOC PROGRESS REPORT
Subjective


Progress Note for:: 03/22/20


Subjective:: 


HALI HARRISON is a 49 year old male who presented to the emergency room 

with acute alcohol withdrawal symptoms.  He admits a multi-decade history of 

chronic alcoholism with alcoholic cirrhosis and further admits to drinking a 

pint of bourbon after waking up this morning.  He finished his bourbon at about 

noon today and has not had any alcohol intake since.  Since that time he has 

been experiencing progressively worsening malaise, ague, nausea and diarrhea.  H

e admits to numerous prior similar episodes related to alcohol withdrawal.  He 

denies other associated or accompanying signs and symptoms.  He has not 

identified any additional aggravating or ameliorating factors for his alcohol 

withdrawal symptoms.  In the emergency room he was found to have a blood alcohol

of 197 and was also noted to have a bilirubin of 18.  He was tachycardic and 

mildly hypotensive initially but improved with IV fluids.  He was also noted to 

be hypokalemic.  He was subsequently admitted to the hospital for further 

evaluation and acute detoxification treatment.





3/20/2020.  Saw patient this morning, sitting in bed enjoying his breakfast, no 

apparent distress, alert and oriented, complaining of mild anxiety, denies any 

fever, chills, nausea, vomiting, diarrhea.





3/21/2020.  No acute events overnight.  Resting in bed no apparent distress, 

denies any fever, chills, nausea, vomiting, diarrhea, constipation or any 

urinary symptoms.  Denies any anxiety, visual or auditory hallucinations.





3/22/2020.  Overnight patient was reported to be anxious, received 

benzodiazepines, this morning patient comfortably resting in bed, no apparent 

distress, denies any auditory visual hallucination, endorsing low appetite, 

denies any fever, chills, nausea, vomiting, diarrhea, constipation or any 

urinary symptoms.


Reason For Visit: 


ACUTE ALCOHOL WITHDRAWAL








Physical Exam


Vital Signs: 


                                        











Temp Pulse Resp BP Pulse Ox


 


 97.9 F   101 H  16   101/52 L  95 


 


 03/22/20 08:07  03/22/20 08:07  03/22/20 08:07  03/22/20 08:07  03/22/20 08:07








                                 Intake & Output











 03/21/20 03/22/20 03/23/20





 06:59 06:59 06:59


 


Intake Total 4186 1571 988


 


Output Total 925 700 


 


Balance 3261 871 988


 


Weight 110.6 kg 110.3 kg 











General appearance: PRESENT: no acute distress, well-developed, well-nourished


Head exam: PRESENT: atraumatic, normocephalic


Respiratory exam: PRESENT: clear to auscultation cesar.  ABSENT: rales, rhonchi, 

wheezes


Cardiovascular exam: PRESENT: RRR.  ABSENT: diastolic murmur, rubs, systolic 

murmur


GI/Abdominal exam: PRESENT: normal bowel sounds, soft.  ABSENT: distended, 

guarding, mass, organolmegaly, rebound, tenderness


Extremities exam: PRESENT: +1 edema


Neurological exam: PRESENT: alert, awake, oriented to person, oriented to place,

oriented to time, oriented to situation, CN II-XII grossly intact.  ABSENT: 

motor sensory deficit





Results


Laboratory Results: 


                                        





                                 03/22/20 07:25 





                                 03/22/20 07:25 





                                        











  03/21/20 03/22/20 03/22/20





  15:41 07:25 07:25


 


WBC   11.9 H 


 


RBC   2.44 L 


 


Hgb   8.7 L 


 


Hct   25.9 L 


 


MCV   106 H 


 


MCH   35.6 H 


 


MCHC   33.5 


 


RDW   18.0 H 


 


Plt Count   201 


 


Seg Neutrophils %   Not Reportable 


 


Sodium    133.7 L


 


Potassium  3.0 L*   3.3 L


 


Chloride    101


 


Carbon Dioxide    26


 


Anion Gap    7


 


BUN    3 L


 


Creatinine    0.62


 


Est GFR ( Amer)    > 60


 


Glucose    80


 


Calcium    7.5 L


 


Magnesium  1.7   1.7


 


Total Bilirubin    21.0 H


 


AST    157 H


 


Alkaline Phosphatase    287 H


 


Total Protein    6.1 L


 


Albumin    2.4 L











Impressions: 


                                        





Chest X-Ray  03/19/20 18:31


IMPRESSION:


 


Significantly elevated right hemidiaphragm with suspected small


right pleural effusion/right basilar opacity. Consider


superimposed atelectasis or pneumonia to include aspiration.


 


 


copyright 2011 [employer]- All Rights Reserved


 














Assessment and Plan





- Diagnosis


(1) Hypokalemia


Is this a current diagnosis for this admission?: Yes   


Plan: 


Likely due to low p.o. intake.


No acute EKG changes.


Continue supplemental potassium.  Potassium level tomorrow.








(2) Alcohol intoxication


Qualifiers: 


   Complication of substance-induced condition: uncomplicated   Qualified 

Code(s): F10.920 - Alcohol use, unspecified with intoxication, uncomplicated   


Is this a current diagnosis for this admission?: Yes   


Plan: 


Denies any visual or auditory hallucination.   


Continue DT protocol.


Implement fall, seizure and aspiration precautions.








(3) Alcoholic cirrhosis


Qualifiers: 


   Ascites presence: unspecified   Qualified Code(s): K70.30 - Alcoholic 

cirrhosis of liver without ascites   


Is this a current diagnosis for this admission?: Yes   


Plan: 


Severely jaundiced with elevated T bili, AST, ALT.





Afebrile.  WBC WNL.  Denies any abdominal pain.  





Monitor volume status, monitor for bleeding, monitor for seizure.





Continue Lasix, spironolactone and lactulose.





Outpatient PCP and gastroenterology follow-up.








(4) Alcoholic hepatitis


Qualifiers: 


   Ascites presence: without ascites   Qualified Code(s): K70.10 - Alcoholic 

hepatitis without ascites   


Is this a current diagnosis for this admission?: Yes   


Plan: 


As above.








(5) Tobacco use disorder, severe, dependence


Is this a current diagnosis for this admission?: Yes   


Plan: 


Counseled on quitting.


NicoDerm patch will be provided.








(6) Polysubstance abuse


Is this a current diagnosis for this admission?: Yes   


Plan: 


UDS positive for benzodiazepines.


Monitor for withdrawal.  Monitor vitals.








- Plan Summary


Summary: 


Patient will be admitted to the [facility] where he will receive routine supportive 

and symptomatic cares.  He will receive IV fluids with supplemental potassium.  

He will receive Valium 5 mg p.o. every 4 hours and will also have Valium 10 mg 

IV every hour as needed severe tremor/agitation/anxiety.  He will use Thorazine 

25 mg IV every 8 hours as needed extreme agitation or hallucinations.  He will 

be continued on his usual medications for his chronic medical problems as 

appropriate.  CBCs, metabolic profiles and magnesium levels will be obtained as 

appropriate.  A nutritionist consultation will be obtained for appropriate diet 

management.

## 2020-03-23 LAB
ALBUMIN SERPL-MCNC: 2.3 G/DL (ref 3.5–5)
ALP SERPL-CCNC: 279 U/L (ref 38–126)
ANION GAP SERPL CALC-SCNC: 9 MMOL/L (ref 5–19)
AST SERPL-CCNC: 136 U/L (ref 17–59)
BILIRUB DIRECT SERPL-MCNC: 18.3 MG/DL (ref 0–0.4)
BILIRUB SERPL-MCNC: 20.5 MG/DL (ref 0.2–1.3)
BUN SERPL-MCNC: 5 MG/DL (ref 7–20)
CALCIUM: 7.5 MG/DL (ref 8.4–10.2)
CHLORIDE SERPL-SCNC: 102 MMOL/L (ref 98–107)
CO2 SERPL-SCNC: 23 MMOL/L (ref 22–30)
ERYTHROCYTE [DISTWIDTH] IN BLOOD BY AUTOMATED COUNT: 18.7 % (ref 11.5–14)
GLUCOSE SERPL-MCNC: 81 MG/DL (ref 75–110)
HCT VFR BLD CALC: 25.7 % (ref 37.9–51)
HGB BLD-MCNC: 8.6 G/DL (ref 13.5–17)
INR PPP: 1.44
MCH RBC QN AUTO: 36 PG (ref 27–33.4)
MCHC RBC AUTO-ENTMCNC: 33.6 G/DL (ref 32–36)
MCV RBC AUTO: 107 FL (ref 80–97)
PLATELET # BLD: 220 10^3/UL (ref 150–450)
POTASSIUM SERPL-SCNC: 3.1 MMOL/L (ref 3.6–5)
PROT SERPL-MCNC: 5.9 G/DL (ref 6.3–8.2)
PROTHROMBIN TIME: 17.7 SEC (ref 11.4–15.4)
RBC # BLD AUTO: 2.4 10^6/UL (ref 4.35–5.55)
WBC # BLD AUTO: 11.5 10^3/UL (ref 4–10.5)

## 2020-03-23 RX ADMIN — FAMOTIDINE SCH MG: 20 TABLET, FILM COATED ORAL at 22:04

## 2020-03-23 RX ADMIN — HEPARIN SODIUM SCH UNIT: 5000 INJECTION, SOLUTION INTRAVENOUS; SUBCUTANEOUS at 13:15

## 2020-03-23 RX ADMIN — POTASSIUM CHLORIDE SCH MEQ: 750 TABLET, FILM COATED, EXTENDED RELEASE ORAL at 17:00

## 2020-03-23 RX ADMIN — SPIRONOLACTONE SCH MG: 25 TABLET, FILM COATED ORAL at 09:59

## 2020-03-23 RX ADMIN — POTASSIUM CHLORIDE SCH MEQ: 750 TABLET, FILM COATED, EXTENDED RELEASE ORAL at 09:59

## 2020-03-23 RX ADMIN — Medication SCH ML: at 05:16

## 2020-03-23 RX ADMIN — HEPARIN SODIUM SCH: 5000 INJECTION, SOLUTION INTRAVENOUS; SUBCUTANEOUS at 05:05

## 2020-03-23 RX ADMIN — Medication SCH: at 13:14

## 2020-03-23 RX ADMIN — Medication SCH ML: at 22:07

## 2020-03-23 RX ADMIN — FOLIC ACID SCH MG: 1 TABLET ORAL at 09:58

## 2020-03-23 RX ADMIN — LORAZEPAM PRN MG: 2 INJECTION INTRAMUSCULAR; INTRAVENOUS at 01:36

## 2020-03-23 RX ADMIN — RIFAXIMIN SCH MG: 550 TABLET ORAL at 09:46

## 2020-03-23 RX ADMIN — FUROSEMIDE SCH MG: 10 INJECTION, SOLUTION INTRAMUSCULAR; INTRAVENOUS at 05:07

## 2020-03-23 RX ADMIN — FAMOTIDINE SCH MG: 20 TABLET, FILM COATED ORAL at 10:00

## 2020-03-23 RX ADMIN — NICOTINE PRN EACH: 14 PATCH, EXTENDED RELEASE TOPICAL at 16:58

## 2020-03-23 RX ADMIN — DIAZEPAM SCH MG: 5 TABLET ORAL at 05:09

## 2020-03-23 RX ADMIN — HEPARIN SODIUM SCH: 5000 INJECTION, SOLUTION INTRAVENOUS; SUBCUTANEOUS at 22:05

## 2020-03-23 NOTE — PDOC PROGRESS REPORT
Subjective


Progress Note for:: 03/23/20


Subjective:: 





Patient asked if he would be going home today.  At this time he denies any 

abdominal pain.  He denies any anxiety, shortness of breath nausea or vomiting. 

Denies any confusion as well.





Reason For Visit: 


ACUTE ALCOHOL WITHDRAWAL








Physical Exam


Vital Signs: 


                                        











Temp Pulse Resp BP Pulse Ox


 


 99.1 F   107 H  12   102/54 L  94 


 


 03/23/20 07:29  03/23/20 09:19  03/23/20 09:19  03/23/20 07:29  03/23/20 09:19








                                 Intake & Output











 03/22/20 03/23/20 03/24/20





 06:59 06:59 06:59


 


Intake Total 1571 2238 


 


Output Total 700 1250 


 


Balance 871 988 


 


Weight 110.3 kg 110.7 kg 











General appearance: PRESENT: no acute distress, cooperative


Neck exam: ABSENT: JVD


Respiratory exam: PRESENT: clear to auscultation cesar, unlabored.  ABSENT: 

tachypnea, wheezes


Cardiovascular exam: PRESENT: RRR, +S1, +S2.  ABSENT: tachycardia


GI/Abdominal exam: PRESENT: distended, organolmegaly, soft.  ABSENT: ascites, 

rebound, rigid, tenderness


Extremities exam: PRESENT: pedal edema


Neurological exam: PRESENT: alert, awake, oriented to person, oriented to place,

oriented to time, oriented to situation


Psychiatric exam: ABSENT: agitated, anxious


Skin exam: PRESENT: jaundice





Results


Laboratory Results: 


                                        





                                 03/23/20 05:12 





                                 03/23/20 05:12 





                                        











  03/23/20 03/23/20





  05:12 05:12


 


WBC  11.5 H 


 


RBC  2.40 L 


 


Hgb  8.6 L 


 


Hct  25.7 L 


 


MCV  107 H 


 


MCH  36.0 H 


 


MCHC  33.6 


 


RDW  18.7 H 


 


Plt Count  220 


 


Sodium   134.1 L


 


Potassium   3.1 L


 


Chloride   102


 


Carbon Dioxide   23


 


Anion Gap   9


 


BUN   5 L


 


Creatinine   0.65


 


Est GFR (African Amer)   > 60


 


Glucose   81


 


Calcium   7.5 L


 


Total Bilirubin   20.5 H


 


AST   136 H


 


Alkaline Phosphatase   279 H


 


Total Protein   5.9 L


 


Albumin   2.3 L











Impressions: 


                                        





Chest X-Ray  03/19/20 18:31


IMPRESSION:


 


Significantly elevated right hemidiaphragm with suspected small


right pleural effusion/right basilar opacity. Consider


superimposed atelectasis or pneumonia to include aspiration.


 


 


copyright 2011 [employer]- All Rights Reserved


 














Assessment and Plan





- Diagnosis


(1) Alcoholic hepatitis


Qualifiers: 


   Ascites presence: unspecified   Qualified Code(s): K70.10 - Alcoholic hepatit

is without ascites   


Is this a current diagnosis for this admission?: Yes   


Plan: 


Elevated transaminases with mild downtrend, hyperbilirubinemia 


Maddrey discrimination factor score of 31 --> prednisolone not indicated at this

time


Continue to monitor liver enzymes


Hepatitis panel 1 month ago was negative and abdominal ultrasound last month 

showed no evidence of biliary tract obstruction


Continue supportive conservative treatment


Mild to moderate malnutrition noted-dietitian consulted.  Nutritional support.


Patient encouraged of the significance of alcohol abstinence








(2) Alcohol use disorder, severe, dependence


Is this a current diagnosis for this admission?: Yes   


Plan: 


Patient presented with alcohol intoxication.  Has not experienced any thorough 

withdrawal at this time but has required ICU on prior visits for delirium 

tremens.  Currently on Valium and as needed Ativan as prophylaxis against 

withdrawal.


I will start to wean patient off of the benzodiazepines.  We will wean diazepam 

today.  Continue monitoring CIWA.








(3) Hypokalemia


Is this a current diagnosis for this admission?: Yes   


Plan: 


Replete potassium chloride supplements.  Monitor BMP.








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


Is this a current diagnosis for this admission?: Yes   


Plan: 


Patient clearly has chronic liver disease secondary to alcohol possibly cirrhosi

s.


MELD-Na score of 25 indicating 15% 3-month mortality if cirrhotic but patient is

clearly not a liver transplant candidate at this time due to his very recent 

polysubstance and alcohol abuse.  


I will discontinue patient's rifamixin and continue lactulose given that he is 

not currently encephalopathic.


Small hepatic hydrothorax noted on x-ray.  Not much ascites.  Will maintain on 

spironolactone 25 mg daily.  Will change Lasix from IV to p.o. 20 mg daily.


Patient will ultimately need to follow-up with his hepatologist in the 

outpatient setting for further care.








(5) Polysubstance abuse


Is this a current diagnosis for this admission?: Yes   


Plan: 


UDS positive for benzodiazepines.


Monitor for withdrawal.  Monitor vitals.








(6) Tobacco use disorder, severe, dependence


Is this a current diagnosis for this admission?: Yes   


Plan: 


Counseled on quitting.


NicoDerm patch will be provided.








- Time


Time Spent with patient: Less than 15 minutes

## 2020-03-24 LAB
ALBUMIN SERPL-MCNC: 2.5 G/DL (ref 3.5–5)
ALP SERPL-CCNC: 298 U/L (ref 38–126)
ANION GAP SERPL CALC-SCNC: 10 MMOL/L (ref 5–19)
ANION GAP SERPL CALC-SCNC: 12 MMOL/L (ref 5–19)
AST SERPL-CCNC: 134 U/L (ref 17–59)
BILIRUB DIRECT SERPL-MCNC: 21.8 MG/DL (ref 0–0.4)
BILIRUB SERPL-MCNC: 23.8 MG/DL (ref 0.2–1.3)
BUN SERPL-MCNC: 7 MG/DL (ref 7–20)
BUN SERPL-MCNC: 8 MG/DL (ref 7–20)
CALCIUM: 7.6 MG/DL (ref 8.4–10.2)
CALCIUM: 7.7 MG/DL (ref 8.4–10.2)
CHLORIDE SERPL-SCNC: 100 MMOL/L (ref 98–107)
CHLORIDE SERPL-SCNC: 98 MMOL/L (ref 98–107)
CO2 SERPL-SCNC: 22 MMOL/L (ref 22–30)
CO2 SERPL-SCNC: 23 MMOL/L (ref 22–30)
GLUCOSE SERPL-MCNC: 78 MG/DL (ref 75–110)
GLUCOSE SERPL-MCNC: 88 MG/DL (ref 75–110)
POTASSIUM SERPL-SCNC: 3 MMOL/L (ref 3.6–5)
POTASSIUM SERPL-SCNC: 4 MMOL/L (ref 3.6–5)
PROT SERPL-MCNC: 6.6 G/DL (ref 6.3–8.2)

## 2020-03-24 RX ADMIN — HEPARIN SODIUM SCH: 5000 INJECTION, SOLUTION INTRAVENOUS; SUBCUTANEOUS at 05:12

## 2020-03-24 RX ADMIN — Medication SCH ML: at 05:12

## 2020-03-24 RX ADMIN — POTASSIUM CHLORIDE SCH MLS/HR: 29.8 INJECTION, SOLUTION INTRAVENOUS at 12:54

## 2020-03-24 RX ADMIN — Medication SCH: at 21:38

## 2020-03-24 RX ADMIN — POTASSIUM CHLORIDE SCH MEQ: 750 TABLET, FILM COATED, EXTENDED RELEASE ORAL at 10:45

## 2020-03-24 RX ADMIN — POTASSIUM CHLORIDE SCH MEQ: 750 TABLET, FILM COATED, EXTENDED RELEASE ORAL at 12:54

## 2020-03-24 RX ADMIN — POTASSIUM CHLORIDE SCH MLS/HR: 29.8 INJECTION, SOLUTION INTRAVENOUS at 10:44

## 2020-03-24 RX ADMIN — HEPARIN SODIUM SCH: 5000 INJECTION, SOLUTION INTRAVENOUS; SUBCUTANEOUS at 21:37

## 2020-03-24 RX ADMIN — LACTULOSE SCH: 20 SOLUTION ORAL at 10:45

## 2020-03-24 RX ADMIN — Medication SCH ML: at 13:54

## 2020-03-24 RX ADMIN — FAMOTIDINE SCH MG: 20 TABLET, FILM COATED ORAL at 10:49

## 2020-03-24 RX ADMIN — HEPARIN SODIUM SCH: 5000 INJECTION, SOLUTION INTRAVENOUS; SUBCUTANEOUS at 13:52

## 2020-03-24 RX ADMIN — POTASSIUM CHLORIDE SCH MEQ: 750 TABLET, FILM COATED, EXTENDED RELEASE ORAL at 17:09

## 2020-03-24 RX ADMIN — FOLIC ACID SCH MG: 1 TABLET ORAL at 10:50

## 2020-03-24 RX ADMIN — SPIRONOLACTONE SCH MG: 25 TABLET, FILM COATED ORAL at 10:49

## 2020-03-24 RX ADMIN — LEVALBUTEROL HYDROCHLORIDE PRN MG: 0.63 SOLUTION RESPIRATORY (INHALATION) at 09:45

## 2020-03-24 RX ADMIN — SPIRONOLACTONE SCH MG: 25 TABLET, FILM COATED ORAL at 21:38

## 2020-03-24 NOTE — PDOC DISCHARGE SUMMARY
Impression





- Admit/DC Date/PCP


Admission Date/Primary Care Provider: 


  03/19/20 21:41





  VIDHYA SIMONS MD





Discharge Date: 03/24/20





- Discharge Diagnosis


(1) Alcoholic hepatitis


Is this a current diagnosis for this admission?: Yes   





(2) Alcohol use disorder, severe, dependence


Is this a current diagnosis for this admission?: Yes   





(3) Hypokalemia


Is this a current diagnosis for this admission?: Yes   





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


Is this a current diagnosis for this admission?: Yes   





(5) Polysubstance abuse


Is this a current diagnosis for this admission?: Yes   





(6) Tobacco use disorder, severe, dependence


Is this a current diagnosis for this admission?: Yes   





- Additional Information


Resuscitation Status: Full Code


Discharge Diet: As Tolerated


Discharge Activity: Activity As Tolerated


Referrals: 


VIDHYA SIMONS MD [Primary Care Provider] - 03/31/20 10:00 am


ROVERTO SOOD MD [ACTIVE STAFF] - 04/02/20 1:45 pm


Prescriptions: 


Spironolactone [Aldactone 25 mg Tablet] 25 mg PO Q12 #60 tablet


Folic Acid [Folvite 1 mg Tablet] 1 mg PO DAILY #30 tablet


Home Medications: 








Lactulose [Cephulac Syrup 20 gm/30 ml Udcup] 20 gm PO DAILY 30 Days #1000 ml 

03/04/20 


Pantoprazole Sodium [Protonix 40 mg Dr Tablet] 40 mg PO DAILY #14 tablet.dr 

03/04/20 


Sucralfate [Carafate 1 gm Tablet] 1 gm PO ACHS #60 tablet 03/04/20 


Albuterol Sulfate [Proventil Hfa] 6.7 gm IH Q4HP PRN 03/20/20 


Folic Acid [Folvite 1 mg Tablet] 1 mg PO DAILY #30 tablet 03/24/20 


Potassium Chloride 40 meq PO BID #20 tablet.er 03/24/20 


Spironolactone [Aldactone 25 mg Tablet] 25 mg PO Q12 #60 tablet 03/24/20 











History of Present Illiness


History of Present Illness: 


HALI HARRISON is a 49 year old male who presented to the emergency room 

with acute alcohol withdrawal symptoms.  He admits a multi-decade history of 

chronic alcoholism with alcoholic cirrhosis and further admits to drinking a 

pint of bourbon after waking up this morning.  He finished his bourbon at about 

noon today and has not had any alcohol intake since.  Since that time he has 

been experiencing progressively worsening malaise, ague, nausea and diarrhea.  

He admits to numerous prior similar episodes related to alcohol withdrawal.  He 

denies other associated or accompanying signs and symptoms.  He has not 

identified any additional aggravating or ameliorating factors for his alcohol 

withdrawal symptoms.  In the emergency room he was found to have a blood alcohol

of 197 and was also noted to have a bilirubin of 18.  He was tachycardic and 

mildly hypotensive initially but improved with IV fluids.  He was also noted to 

be hypokalemic.  He was subsequently admitted to the hospital for further 

evaluation and acute detoxification treatment.








Hospital Course


Hospital Course: 


(1) Alcoholic hepatitis


Qualifiers: 


   Ascites presence: unspecified   Qualified Code(s): K70.10 - Alcoholic 

hepatitis without ascites   


Is this a current diagnosis for this admission?: Yes   


Plan: 


Elevated transaminases, hyperbilirubinemia 


Maddrey discrimination factor score of 31 --> prednisolone not indicated at this

time


Liver enzymes eventually started to trend down mildly


Viral hepatitis panel 1 month ago was negative and abdominal ultrasound last 

month showed no evidence of biliary tract obstruction


Treated with supportive conservative management


Mild to moderate malnutrition noted-dietitian consulted.  Nutritional support.


Patient encouraged of the significance of alcohol abstinence








(2) Alcohol use disorder, severe, dependence


Is this a current diagnosis for this admission?: Yes   


Plan: 


Patient presented with alcohol intoxication.  Patient was placed on Valium and 

Ativan as needed.  Patient has been weaned off Valium and has not required any 

Ativan as his last CIWA scores have been 0.  At this time he shows no evidence 

of withdrawal.  He has been advised on the importance of abstaining from alcohol

and substance abuse.








(3) Hypokalemia


Is this a current diagnosis for this admission?: Yes   


Plan: 


Discharged with potassium supplements.  Possible etiologies include Lasix, 

rifamixin/Lactulose-->rifamixin d/cd. Lactulose at 20g daily now.  No evidence 

of RTA.








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


Is this a current diagnosis for this admission?: Yes   


Plan: 


Patient clearly has chronic liver disease secondary to alcohol possibly 

cirrhosis.


MELD-Na score of 25 indicating 15% 3-month mortality if cirrhotic but patient is

clearly not a liver transplant candidate at this time due to his very recent 

polysubstance and alcohol abuse.  


Lactulose, spironolactone.  Unable to add Lasix due to hypokalemia.


Small hepatic hydrothorax noted on x-ray.  Not much ascites.  


Scheduled to follow-up with Dr. Sood for further care.








(5) Polysubstance abuse


Is this a current diagnosis for this admission?: Yes   


Plan: 


Advised against substance abuse.








(6) Tobacco use disorder, severe, dependence


Is this a current diagnosis for this admission?: Yes   


Plan: 


Counseled on quitting.








Physical Exam


Vital Signs: 


                                        











Temp Pulse Resp BP Pulse Ox


 


 98.3 F   106 H  18   99/55 L  97 


 


 03/24/20 16:15  03/24/20 16:15  03/24/20 16:15  03/24/20 16:15  03/24/20 16:15








                                 Intake & Output











 03/23/20 03/24/20 03/25/20





 06:59 06:59 06:59


 


Intake Total 2238 820 490


 


Output Total 1250 606 


 


Balance 988 214 490


 


Weight 110.7 kg 109.5 kg 











General appearance: PRESENT: no acute distress, cooperative


Respiratory exam: PRESENT: clear to auscultation cesar, unlabored.  ABSENT: 

tachypnea, wheezes


Cardiovascular exam: PRESENT: RRR, +S1, +S2.  ABSENT: tachycardia


GI/Abdominal exam: PRESENT: soft.  ABSENT: rebound, rigid, tenderness


Musculoskeletal exam: PRESENT: ambulatory


Neurological exam: PRESENT: alert, awake, oriented to person, oriented to place,

oriented to time


Skin exam: PRESENT: jaundice





Results


Laboratory Results: 


                                        











WBC  11.5 10^3/uL (4.0-10.5)  H  03/23/20  05:12    


 


RBC  2.40 10^6/uL (4.35-5.55)  L  03/23/20  05:12    


 


Hgb  8.6 g/dL (13.5-17.0)  L  03/23/20  05:12    


 


Hct  25.7 % (37.9-51.0)  L  03/23/20  05:12    


 


MCV  107 fl (80-97)  H  03/23/20  05:12    


 


MCH  36.0 pg (27.0-33.4)  H  03/23/20  05:12    


 


MCHC  33.6 g/dL (32.0-36.0)   03/23/20  05:12    


 


RDW  18.7 % (11.5-14.0)  H  03/23/20  05:12    


 


Plt Count  220 10^3/uL (150-450)   03/23/20  05:12    


 


Lymph % (Auto)  Not Reportable   03/22/20  07:25    


 


Mono % (Auto)  Not Reportable   03/22/20  07:25    


 


Eos % (Auto)  Not Reportable   03/22/20  07:25    


 


Baso % (Auto)  Not Reportable   03/22/20  07:25    


 


Absolute Neuts (auto)  Not Reportable   03/22/20  07:25    


 


Absolute Lymphs (auto)  Not Reportable   03/22/20  07:25    


 


Absolute Monos (auto)  Not Reportable   03/22/20  07:25    


 


Absolute Eos (auto)  Not Reportable   03/22/20  07:25    


 


Absolute Basos (auto)  Not Reportable   03/22/20  07:25    


 


Total Counted  100   03/22/20  07:25    


 


Seg Neutrophils %  Not Reportable   03/22/20  07:25    


 


Seg Neuts % (Manual)  80 % (42-78)  H  03/22/20  07:25    


 


Band Neutrophils %  1 % (3-5)  L  03/22/20  07:25    


 


Lymphocytes % (Manual)  15 % (13-45)   03/22/20  07:25    


 


Monocytes % (Manual)  3 % (3-13)   03/22/20  07:25    


 


Eosinophils % (Manual)  1 % (0-6)   03/22/20  07:25    


 


Basophils % (Manual)  0 % (0-2)   03/22/20  07:25    


 


Abs Neuts (Manual)  9.6 10^3/uL (1.7-8.2)  H  03/22/20  07:25    


 


Abs Lymphs (Manual)  1.8 10^3/uL (0.5-4.7)   03/22/20  07:25    


 


Abs Monocytes (Manual)  0.4 10^3/uL (0.1-1.4)   03/22/20  07:25    


 


Absolute Eos (Manual)  0.1 10^3/uL (0.0-0.6)   03/22/20  07:25    


 


Abs Basophils (Manual)  0.0 10^3/uL (0.0-0.2)   03/22/20  07:25    


 


Toxic Vacuolation  PRESENT   03/22/20  07:25    


 


Platelet Comment  ADEQUATE   03/22/20  07:25    


 


Polychromasia  1+   03/22/20  07:25    


 


Poikilocytosis  SLIGHT   03/19/20  18:08    


 


Anisocytosis  2+   03/22/20  07:25    


 


Macrocytosis  2+   03/22/20  07:25    


 


Target Cells  1+   03/19/20  18:08    


 


Tear Drop Cells  SLIGHT   03/19/20  18:08    


 


PT  17.7 SEC (11.4-15.4)  H  03/23/20  05:12    


 


INR  1.44   03/23/20  05:12    


 


APTT  40.6 SEC (23.5-35.8)  H  03/19/20  18:56    


 


Sodium  132.8 mmol/L (137-145)  L  03/24/20  15:50    


 


Potassium  4.0 mmol/L (3.6-5.0)  D 03/24/20  15:50    


 


Chloride  100 mmol/L ()   03/24/20  15:50    


 


Carbon Dioxide  23 mmol/L (22-30)   03/24/20  15:50    


 


Anion Gap  10  (5-19)   03/24/20  15:50    


 


BUN  7 mg/dL (7-20)   03/24/20  15:50    


 


Creatinine  0.86 mg/dL (0.52-1.25)   03/24/20  15:50    


 


Est GFR ( Amer)  > 60  (>60)   03/24/20  15:50    


 


Est GFR (MDRD) Non-Af  > 60  (>60)   03/24/20  15:50    


 


Glucose  88 mg/dL ()   03/24/20  15:50    


 


Calcium  7.6 mg/dL (8.4-10.2)  L  03/24/20  15:50    


 


Magnesium  1.6 mg/dL (1.6-2.3)   03/24/20  06:05    


 


Total Bilirubin  23.8 mg/dL (0.2-1.3)  H  03/24/20  06:05    


 


Direct Bilirubin  21.8 mg/dL (0.0-0.4)  H  03/24/20  06:05    


 


Neonat Total Bilirubin  Not Reportable   03/24/20  06:05    


 


Neonat Direct Bilirubin  Not Reportable   03/24/20  06:05    


 


Neonat Indirect Bili  Not Reportable   03/24/20  06:05    


 


AST  134 U/L (17-59)  H  03/24/20  06:05    


 


ALT  54 U/L (<50)  H  03/24/20  06:05    


 


Alkaline Phosphatase  298 U/L ()  H  03/24/20  06:05    


 


Ammonia  36.7 umol/L (9-33)  H  03/20/20  17:52    


 


Total Protein  6.6 g/dL (6.3-8.2)   03/24/20  06:05    


 


Albumin  2.5 g/dL (3.5-5.0)  L  03/24/20  06:05    


 


Lipase  385.6 U/L ()  H  03/19/20  18:08    


 


Urine Color  YOSHI   03/19/20  17:18    


 


Urine Appearance  SLIGHTLY-CLOUDY   03/19/20  17:18    


 


Urine pH  6.0  (5.0-9.0)   03/19/20  17:18    


 


Ur Specific Gravity  1.017   03/19/20  17:18    


 


Urine Protein  30 mg/dL (NEGATIVE)  H  03/19/20  17:18    


 


Urine Glucose (UA)  NEGATIVE mg/dL (NEGATIVE)   03/19/20  17:18    


 


Urine Ketones  NEGATIVE mg/dL (NEGATIVE)   03/19/20  17:18    


 


Urine Blood  NEGATIVE  (NEGATIVE)   03/19/20  17:18    


 


Urine Nitrite (Reflex)  NEGATIVE  (NEGATIVE)   03/19/20  17:18    


 


Urine Bilirubin  MODERATE  (NEGATIVE)  H  03/19/20  17:18    


 


Urine Urobilinogen  4.0 mg/dL (<2.0)  H  03/19/20  17:18    


 


Leukocyte Esterase Rfl  NEGATIVE  (NEGATIVE)   03/19/20  17:18    


 


Urine RBC (Auto)  0 /HPF  03/19/20  17:18    


 


U Hyaline Cast (Auto)  7 /LPF  03/19/20  17:18    


 


Urine Bacteria (Auto)  2+ /HPF  03/19/20  17:18    


 


Urine WBC (Reflex)  4 /HPF  03/19/20  17:18    


 


Squamous Epi Cells Auto  1 /HPF  03/19/20  17:18    


 


Urine Mucus (Auto)  OCC /LPF  03/19/20  17:18    


 


Urine Ascorbic Acid  NEGATIVE  (NEGATIVE)   03/19/20  17:18    


 


Urine Opiates Screen  NEGATIVE   03/19/20  17:18    


 


Urine Methadone Screen  NEGATIVE   03/19/20  17:18    


 


Ur Barbiturates Screen  NEGATIVE   03/19/20  17:18    


 


Ur Phencyclidine Scrn  NEGATIVE   03/19/20  17:18    


 


Ur Amphetamines Screen  NEGATIVE   03/19/20  17:18    


 


U Benzodiazepines Scrn  UNCONFIRMED POSITIVE   03/19/20  17:18    


 


Urine Cocaine Screen  NEGATIVE   03/19/20  17:18    


 


U Marijuana (THC) Screen  NEGATIVE   03/19/20  17:18    


 


Serum Alcohol  191 mg/dL (NONE DETECTED)   03/19/20  18:08    











Impressions: 


                                        





Chest X-Ray  03/19/20 18:31


IMPRESSION:


 


Significantly elevated right hemidiaphragm with suspected small


right pleural effusion/right basilar opacity. Consider


superimposed atelectasis or pneumonia to include aspiration.


 


 


copyright 2011 [employer]- All Rights Reserved


 














Plan


Time Spent: Greater than 30 Minutes





Stroke


Is this a Stroke Patient?: No





Acute Heart Failure





- **


Is this a Heart Failure Patient?: No

## 2020-03-24 NOTE — PDOC PROGRESS REPORT
Subjective


Progress Note for:: 03/24/20


Subjective:: 





Patient feels well today.  Abdominal pain is improved.  However his liver 

enzymes keep going up.  For this reason I have counseled patient was discharged 

and will try patient on prednisolone today.


Reason For Visit: 


ACUTE ALCOHOL WITHDRAWAL








Physical Exam


Vital Signs: 


                                        











Temp Pulse Resp BP Pulse Ox


 


 98.3 F   106 H  18   99/55 L  97 


 


 03/24/20 16:15  03/24/20 16:15  03/24/20 16:15  03/24/20 16:15  03/24/20 16:15








                                 Intake & Output











 03/23/20 03/24/20 03/25/20





 06:59 06:59 06:59


 


Intake Total 2238 820 490


 


Output Total 1250 606 


 


Balance 988 214 490


 


Weight 110.7 kg 109.5 kg 











General appearance: PRESENT: no acute distress, cooperative


Neck exam: ABSENT: JVD


Respiratory exam: PRESENT: clear to auscultation cesar, unlabored.  ABSENT: 

tachypnea, wheezes


Cardiovascular exam: PRESENT: RRR, +S1, +S2.  ABSENT: tachycardia


GI/Abdominal exam: PRESENT: soft.  ABSENT: rebound, rigid, tenderness


Neurological exam: PRESENT: alert, awake, oriented to person, oriented to place,

oriented to time


Skin exam: PRESENT: jaundice





Results


Laboratory Results: 


                                        





                                 03/23/20 05:12 





                                 03/24/20 15:50 





                                        











  03/24/20 03/24/20 03/24/20





  06:05 06:05 15:50


 


Sodium  132.0 L   132.8 L


 


Potassium  3.0 L*   4.0  D


 


Chloride  98   100


 


Carbon Dioxide  22   23


 


Anion Gap  12   10


 


BUN  8   7


 


Creatinine  0.78   0.86


 


Est GFR ( Amer)  > 60   > 60


 


Glucose  78   88


 


Calcium  7.7 L   7.6 L


 


Magnesium   1.6 


 


Total Bilirubin  23.8 H  


 


AST  134 H  


 


Alkaline Phosphatase  298 H  


 


Total Protein  6.6  


 


Albumin  2.5 L  











Impressions: 


                                        





Chest X-Ray  03/19/20 18:31


IMPRESSION:


 


Significantly elevated right hemidiaphragm with suspected small


right pleural effusion/right basilar opacity. Consider


superimposed atelectasis or pneumonia to include aspiration.


 


 


copyright 2011 [employer]- All Rights Reserved


 














Assessment and Plan





- Diagnosis


(1) Alcoholic hepatitis


Qualifiers: 


   Ascites presence: unspecified   Qualified Code(s): K70.10 - Alcoholic 

hepatitis without ascites   


Is this a current diagnosis for this admission?: Yes   


Plan: 


Elevated transaminases with mild downtrend, hyperbilirubinemia 


Hepatitis panel 1 month ago was negative and abdominal ultrasound last month 

showed no evidence of biliary tract obstruction


Maddrey discrimination factor score of 34 today--as such I will start on 

prednisolone 40 mg daily starting today


Continue to trend liver enzymes


Nutritional support.  Will give some hydration.








(2) Alcohol use disorder, severe, dependence


Is this a current diagnosis for this admission?: Yes   


Plan: 


Patient presented with alcohol intoxication.  Has not experienced any thorough 

withdrawal at this time but has required ICU on prior visits for delirium 

tremens.  


CIWA scores have been 0 today.  Valium discontinued.  Will leave Ativan for on 

an as needed basis.








(3) Hypokalemia


Is this a current diagnosis for this admission?: Yes   


Plan: 


Resolved with aggressive repletions.  Lasix discontinued.  Continue potassium 

chloride supplementations.








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


Is this a current diagnosis for this admission?: Yes   


Plan: 


Patient clearly has chronic liver disease secondary to alcohol possibly 

cirrhosis. 


continue lactulose given that he is not currently encephalopathic.


Small hepatic hydrothorax noted on x-ray.  Not much ascites.  Continue 

spironolactone.


Patient will ultimately need to follow-up with his hepatologist in the 

outpatient setting for further care.








(5) Polysubstance abuse


Is this a current diagnosis for this admission?: Yes   


Plan: 


UDS positive for benzodiazepines. Monitor for withdrawal.  Monitor vitals.








(6) Tobacco use disorder, severe, dependence


Is this a current diagnosis for this admission?: Yes   


Plan: 


Counseled on quitting. NicoDerm patch provided.








- Time


Time Spent with patient: 15-24 minutes

## 2020-03-25 LAB
ALBUMIN SERPL-MCNC: 2.6 G/DL (ref 3.5–5)
ALP SERPL-CCNC: 292 U/L (ref 38–126)
ANION GAP SERPL CALC-SCNC: 13 MMOL/L (ref 5–19)
AST SERPL-CCNC: 134 U/L (ref 17–59)
BILIRUB DIRECT SERPL-MCNC: 22.1 MG/DL (ref 0–0.4)
BILIRUB SERPL-MCNC: 24 MG/DL (ref 0.2–1.3)
BUN SERPL-MCNC: 11 MG/DL (ref 7–20)
CALCIUM: 7.8 MG/DL (ref 8.4–10.2)
CHLORIDE SERPL-SCNC: 104 MMOL/L (ref 98–107)
CO2 SERPL-SCNC: 18 MMOL/L (ref 22–30)
GLUCOSE SERPL-MCNC: 112 MG/DL (ref 75–110)
POTASSIUM SERPL-SCNC: 4.4 MMOL/L (ref 3.6–5)
PROT SERPL-MCNC: 6.7 G/DL (ref 6.3–8.2)

## 2020-03-25 RX ADMIN — HEPARIN SODIUM SCH: 5000 INJECTION, SOLUTION INTRAVENOUS; SUBCUTANEOUS at 05:23

## 2020-03-25 RX ADMIN — HEPARIN SODIUM SCH: 5000 INJECTION, SOLUTION INTRAVENOUS; SUBCUTANEOUS at 21:03

## 2020-03-25 RX ADMIN — LACTULOSE SCH GM: 20 SOLUTION ORAL at 10:09

## 2020-03-25 RX ADMIN — SPIRONOLACTONE SCH MG: 25 TABLET, FILM COATED ORAL at 21:04

## 2020-03-25 RX ADMIN — POTASSIUM CHLORIDE SCH MEQ: 750 TABLET, FILM COATED, EXTENDED RELEASE ORAL at 10:05

## 2020-03-25 RX ADMIN — NICOTINE PRN EACH: 14 PATCH, EXTENDED RELEASE TOPICAL at 10:05

## 2020-03-25 RX ADMIN — FOLIC ACID SCH MG: 1 TABLET ORAL at 10:05

## 2020-03-25 RX ADMIN — PANTOPRAZOLE SODIUM SCH MG: 40 TABLET, DELAYED RELEASE ORAL at 05:24

## 2020-03-25 RX ADMIN — POTASSIUM CHLORIDE SCH MEQ: 750 TABLET, FILM COATED, EXTENDED RELEASE ORAL at 17:06

## 2020-03-25 RX ADMIN — Medication SCH: at 05:23

## 2020-03-25 RX ADMIN — Medication SCH ML: at 14:46

## 2020-03-25 RX ADMIN — HEPARIN SODIUM SCH: 5000 INJECTION, SOLUTION INTRAVENOUS; SUBCUTANEOUS at 13:32

## 2020-03-25 RX ADMIN — Medication SCH ML: at 21:04

## 2020-03-25 RX ADMIN — SPIRONOLACTONE SCH MG: 25 TABLET, FILM COATED ORAL at 10:05

## 2020-03-25 RX ADMIN — METHYLPREDNISOLONE SODIUM SUCCINATE SCH MG: 40 INJECTION, POWDER, FOR SOLUTION INTRAMUSCULAR; INTRAVENOUS at 10:06

## 2020-03-25 NOTE — PDOC PROGRESS REPORT
Subjective


Progress Note for:: 03/25/20


Subjective:: 





Patient still jaundiced.  Bilirubin still keeps going up.  Denies any abdominal 

pain at this time.


Reason For Visit: 


ACUTE ALCOHOL WITHDRAWAL








Physical Exam


Vital Signs: 


                                        











Temp Pulse Resp BP Pulse Ox


 


 97.7 F   91   19   116/67   93 


 


 03/25/20 11:34  03/25/20 11:34  03/25/20 11:34  03/25/20 11:34  03/25/20 11:34








                                 Intake & Output











 03/24/20 03/25/20 03/26/20





 06:59 06:59 06:59


 


Intake Total 820 1988 120


 


Output Total 606  


 


Balance 214 1988 120


 


Weight 109.5 kg 108.8 kg 











General appearance: PRESENT: no acute distress, cooperative


Neck exam: ABSENT: JVD


Respiratory exam: PRESENT: clear to auscultation cesar, unlabored.  ABSENT: 

tachypnea, wheezes


Cardiovascular exam: PRESENT: RRR, +S1, +S2.  ABSENT: tachycardia


GI/Abdominal exam: PRESENT: soft.  ABSENT: rebound, rigid, tenderness


Neurological exam: PRESENT: alert, awake, oriented to person, oriented to place,

oriented to time





Results


Laboratory Results: 


                                        





                                 03/23/20 05:12 





                                 03/25/20 12:44 





                                        











  03/24/20 03/25/20





  15:50 12:44


 


Sodium  132.8 L  134.7 L


 


Potassium  4.0  D  4.4


 


Chloride  100  104


 


Carbon Dioxide  23  18 L


 


Anion Gap  10  13


 


BUN  7  11


 


Creatinine  0.86  0.88


 


Est GFR ( Amer)  > 60  > 60


 


Glucose  88  112 H


 


Calcium  7.6 L  7.8 L


 


Total Bilirubin   24.0 H


 


AST   134 H


 


Alkaline Phosphatase   292 H


 


Total Protein   6.7


 


Albumin   2.6 L








                                        





03/20/20 05:29   Blood   Blood Culture - Final


                            NO GROWTH IN 5 DAYS


03/19/20 23:32   Blood   Blood Culture - Final


                            NO GROWTH IN 5 DAYS








Impressions: 


                                        





Chest X-Ray  03/19/20 18:31


IMPRESSION:


 


Significantly elevated right hemidiaphragm with suspected small


right pleural effusion/right basilar opacity. Consider


superimposed atelectasis or pneumonia to include aspiration.


 


 


copyright 2011 [employer]- All Rights Reserved


 














Assessment and Plan





- Diagnosis


(1) Alcoholic hepatitis


Qualifiers: 


   Ascites presence: unspecified   Qualified Code(s): K70.10 - Alcoholic 

hepatitis without ascites   


Is this a current diagnosis for this admission?: Yes   


Plan: 


Viral hepatitis panel 1 month ago was negative and abdominal ultrasound last 

month showed no evidence of biliary tract obstruction


Maddrey discrimination factor score of 34 -->started on Solu-Medrol on 3/25/2020


Nutritional support.  Will give some hydration.


Bilirubin continues to worsen.  Will reevaluate with abdominal ultrasound.











(2) Alcohol use disorder, severe, dependence


Is this a current diagnosis for this admission?: Yes   


Plan: 


Patient presented with alcohol intoxication.  Has not experienced any thorough 

withdrawal at this time but has required ICU on prior visits for delirium 

tremens.  


CIWA scores have been 0.  Valium discontinued.  Will leave Ativan for on an as 

needed basis.








(3) Hypokalemia


Is this a current diagnosis for this admission?: Yes   


Plan: 


Resolved with aggressive repletions.  Lasix discontinued.  Continue potassium 

chloride supplementations.








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


Is this a current diagnosis for this admission?: Yes   


Plan: 


Patient clearly has chronic liver disease secondary to alcohol possibly 

cirrhosis. 


continue lactulose given that he is not currently encephalopathic.


Small hepatic hydrothorax noted on x-ray.  Not much ascites.  Continue 

spironolactone.


Patient will ultimately need to follow-up with his hepatologist in the 

outpatient setting for further care.








(5) Polysubstance abuse


Is this a current diagnosis for this admission?: Yes   


Plan: 


UDS positive for benzodiazepines. Monitor for withdrawal.  Monitor vitals.








(6) Tobacco use disorder, severe, dependence


Is this a current diagnosis for this admission?: Yes   


Plan: 


Counseled on quitting. NicoDerm patch provided.








- Time


Time Spent with patient: Less than 15 minutes

## 2020-03-25 NOTE — RADIOLOGY REPORT (SQ)
EXAM DESCRIPTION:  U/S ABDOMEN LIMITED W/O DOP



COMPLETED DATE/TIME:  3/25/2020 5:16 pm



REASON FOR STUDY:  worsening hyperbilirubinemia



COMPARISON:  2/24/2020



TECHNIQUE:  Dynamic and static grayscale images acquired of the abdomen and recorded on PACS. Magdaleno tavares selected color Doppler and spectral images recorded.



LIMITATIONS:  None.



FINDINGS:  PANCREAS: Not seen.

LIVER: Hepatomegaly.  Increased echogenicity.  No definable mass.  Ascites.

LIVER VASCULATURE: Normal directional flow of the main portal vein and hepatic veins.

GALLBLADDER: Multiple small stones.  Sludge.  Slight wall thickening.  Pericholecystic fluid.

ULTRASOUND-DETECTED MANDUJANO'S SIGN: Negative.

INTRAHEPATIC DUCTS AND COMMON DUCT: Not seen.

AORTA: Not seen.

RIGHT KIDNEY:  Normal size, 11.9 cm. Normal echogenicity. No solid or suspicious masses. No hydroneph
rosis. No calcifications.

PERITONEAL AND RIGHT PLEURAL SPACE: No ascites or effusions.

OTHER: No other significant findings.



IMPRESSION:  Hepatomegaly.  Hepatic steatosis.  There is some ascites.  Cholelithiasis with thickenin
g of gallbladder wall and pericholecystic fluid.  Negative sonographic Mandujano sign.



TECHNICAL DOCUMENTATION:  JOB ID:  [id number]

 2011 [employer]- All Rights Reserved



Reading location - IP/workstation name: [email or web address]

## 2020-03-26 LAB
ALBUMIN SERPL-MCNC: 2.2 G/DL (ref 3.5–5)
ALBUMIN SERPL-MCNC: 2.3 G/DL (ref 3.5–5)
ALP SERPL-CCNC: 241 U/L (ref 38–126)
ALP SERPL-CCNC: 257 U/L (ref 38–126)
ANION GAP SERPL CALC-SCNC: 9 MMOL/L (ref 5–19)
ANION GAP SERPL CALC-SCNC: 9 MMOL/L (ref 5–19)
AST SERPL-CCNC: 100 U/L (ref 17–59)
AST SERPL-CCNC: 96 U/L (ref 17–59)
BILIRUB DIRECT SERPL-MCNC: 19 MG/DL (ref 0–0.4)
BILIRUB DIRECT SERPL-MCNC: 19.5 MG/DL (ref 0–0.4)
BILIRUB SERPL-MCNC: 20.4 MG/DL (ref 0.2–1.3)
BILIRUB SERPL-MCNC: 21.2 MG/DL (ref 0.2–1.3)
BUN SERPL-MCNC: 13 MG/DL (ref 7–20)
BUN SERPL-MCNC: 14 MG/DL (ref 7–20)
CALCIUM: 7.6 MG/DL (ref 8.4–10.2)
CALCIUM: 7.6 MG/DL (ref 8.4–10.2)
CHLORIDE SERPL-SCNC: 104 MMOL/L (ref 98–107)
CHLORIDE SERPL-SCNC: 107 MMOL/L (ref 98–107)
CO2 SERPL-SCNC: 19 MMOL/L (ref 22–30)
CO2 SERPL-SCNC: 20 MMOL/L (ref 22–30)
GLUCOSE SERPL-MCNC: 84 MG/DL (ref 75–110)
GLUCOSE SERPL-MCNC: 94 MG/DL (ref 75–110)
POTASSIUM SERPL-SCNC: 4.1 MMOL/L (ref 3.6–5)
POTASSIUM SERPL-SCNC: 4.3 MMOL/L (ref 3.6–5)
PROT SERPL-MCNC: 5.7 G/DL (ref 6.3–8.2)
PROT SERPL-MCNC: 5.8 G/DL (ref 6.3–8.2)

## 2020-03-26 RX ADMIN — METHYLPREDNISOLONE SODIUM SUCCINATE SCH: 40 INJECTION, POWDER, FOR SOLUTION INTRAMUSCULAR; INTRAVENOUS at 10:40

## 2020-03-26 RX ADMIN — HEPARIN SODIUM SCH: 5000 INJECTION, SOLUTION INTRAVENOUS; SUBCUTANEOUS at 17:44

## 2020-03-26 RX ADMIN — HEPARIN SODIUM SCH: 5000 INJECTION, SOLUTION INTRAVENOUS; SUBCUTANEOUS at 05:24

## 2020-03-26 RX ADMIN — Medication SCH: at 22:14

## 2020-03-26 RX ADMIN — LACTULOSE SCH: 20 SOLUTION ORAL at 10:32

## 2020-03-26 RX ADMIN — SPIRONOLACTONE SCH MG: 25 TABLET, FILM COATED ORAL at 10:33

## 2020-03-26 RX ADMIN — PANTOPRAZOLE SODIUM SCH MG: 40 TABLET, DELAYED RELEASE ORAL at 05:26

## 2020-03-26 RX ADMIN — HEPARIN SODIUM SCH: 5000 INJECTION, SOLUTION INTRAVENOUS; SUBCUTANEOUS at 22:14

## 2020-03-26 RX ADMIN — Medication SCH: at 17:44

## 2020-03-26 RX ADMIN — Medication SCH ML: at 05:26

## 2020-03-26 RX ADMIN — SPIRONOLACTONE SCH MG: 25 TABLET, FILM COATED ORAL at 22:13

## 2020-03-26 RX ADMIN — POTASSIUM CHLORIDE SCH MEQ: 750 TABLET, FILM COATED, EXTENDED RELEASE ORAL at 17:47

## 2020-03-26 RX ADMIN — POTASSIUM CHLORIDE SCH MEQ: 750 TABLET, FILM COATED, EXTENDED RELEASE ORAL at 10:33

## 2020-03-26 RX ADMIN — FOLIC ACID SCH MG: 1 TABLET ORAL at 10:33

## 2020-03-26 NOTE — PDOC PROGRESS REPORT
Subjective


Progress Note for:: 03/26/20


Subjective:: 





Patient still jaundiced.Bowel ,movement was solid today. Denies any abdominal 

pain at this time.


Reason For Visit: 


ACUTE ALCOHOL WITHDRAWAL








Physical Exam


Vital Signs: 


                                        











Temp Pulse Resp BP Pulse Ox


 


 97.8 F   92   18   100/54 L  100 


 


 03/26/20 15:21  03/26/20 15:21  03/26/20 15:21  03/26/20 15:21  03/26/20 15:21








                                 Intake & Output











 03/25/20 03/26/20 03/27/20





 06:59 06:59 06:59


 


Intake Total 1988 960 


 


Output Total  505 


 


Balance 1988 455 


 


Weight 108.8 kg 107.9 kg 











General appearance: PRESENT: no acute distress, cooperative


Neck exam: ABSENT: JVD


Respiratory exam: PRESENT: clear to auscultation cesar


GI/Abdominal exam: PRESENT: soft.  ABSENT: tenderness


Neurological exam: PRESENT: alert, awake


Skin exam: PRESENT: jaundice





Results


Laboratory Results: 


                                        





                                 03/23/20 05:12 





                                 03/26/20 15:37 





                                        











  03/26/20 03/26/20





  04:48 15:37


 


Sodium  134.8 L  132.9 L


 


Potassium  4.1  4.3


 


Chloride  107  104


 


Carbon Dioxide  19 L  20 L


 


Anion Gap  9  9


 


BUN  14  13


 


Creatinine  0.87  0.88


 


Est GFR ( Amer)  > 60  > 60


 


Glucose  84  94


 


Calcium  7.6 L  7.6 L


 


Total Bilirubin  20.4 H D  21.2 H


 


AST  100 H  96 H


 


Alkaline Phosphatase  241 H  257 H


 


Total Protein  5.7 L  5.8 L


 


Albumin  2.2 L  2.3 L











Impressions: 


                                        





Chest X-Ray  03/19/20 18:31


IMPRESSION:


 


Significantly elevated right hemidiaphragm with suspected small


right pleural effusion/right basilar opacity. Consider


superimposed atelectasis or pneumonia to include aspiration.


 


 


copyright 2011 [employer]- All Rights Reserved


 








Abdomen Ultrasound  03/25/20 00:00


IMPRESSION:  Hepatomegaly.  Hepatic steatosis.  There is some ascites.  

Cholelithiasis with thickening of gallbladder wall and pericholecystic fluid.  

Negative sonographic Shen sign.


 














Assessment and Plan





- Diagnosis


(1) Alcoholic hepatitis


Qualifiers: 


   Ascites presence: unspecified   Qualified Code(s): K70.10 - Alcoholic hepatit

is without ascites   


Is this a current diagnosis for this admission?: Yes   


Plan: 


Viral hepatitis panel 1 month ago was negative and abdominal ultrasound last 

month showed no evidence of biliary tract obstruction


Maddrey discrimination factor score of 34 -->started on Solu-Medrol on 3/25/2020


Nutritional support.  Will give some hydration.


Abd us shows no obstruction of CBD











(2) Alcohol use disorder, severe, dependence


Is this a current diagnosis for this admission?: Yes   


Plan: 


Patient presented with alcohol intoxication.  Has not experienced any thorough 

withdrawal at this time but has required ICU on prior visits for delirium 

tremens.  


CIWA scores have been 0.  Valium discontinued.  Will leave Ativan for on an as 

needed basis.








(3) Hypokalemia


Is this a current diagnosis for this admission?: Yes   


Plan: 


Resolved with aggressive repletions.  Lasix discontinued.  Continue potassium 

chloride supplementations.








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


Is this a current diagnosis for this admission?: Yes   


Plan: 


Patient clearly has chronic liver disease secondary to alcohol likely cirrhosis.




continue lactulose given that he is not currently encephalopathic.


Small hepatic hydrothorax noted on x-ray.  Not much ascites.  Continue s

pironolactone.


Patient will ultimately need to follow-up with his hepatologist in the 

outpatient setting for further care.








(5) Polysubstance abuse


Is this a current diagnosis for this admission?: Yes   





(6) Tobacco use disorder, severe, dependence


Is this a current diagnosis for this admission?: Yes   





- Time


Time Spent with patient: Less than 15 minutes

## 2020-03-27 VITALS — DIASTOLIC BLOOD PRESSURE: 65 MMHG | SYSTOLIC BLOOD PRESSURE: 113 MMHG

## 2020-03-27 LAB
ALBUMIN SERPL-MCNC: 2.2 G/DL (ref 3.5–5)
ALP SERPL-CCNC: 239 U/L (ref 38–126)
ANION GAP SERPL CALC-SCNC: 8 MMOL/L (ref 5–19)
AST SERPL-CCNC: 105 U/L (ref 17–59)
BILIRUB DIRECT SERPL-MCNC: 17.5 MG/DL (ref 0–0.4)
BILIRUB SERPL-MCNC: 19.2 MG/DL (ref 0.2–1.3)
BUN SERPL-MCNC: 11 MG/DL (ref 7–20)
CALCIUM: 7.9 MG/DL (ref 8.4–10.2)
CHLORIDE SERPL-SCNC: 106 MMOL/L (ref 98–107)
CO2 SERPL-SCNC: 20 MMOL/L (ref 22–30)
GLUCOSE SERPL-MCNC: 82 MG/DL (ref 75–110)
POTASSIUM SERPL-SCNC: 3.9 MMOL/L (ref 3.6–5)
PROT SERPL-MCNC: 5.8 G/DL (ref 6.3–8.2)

## 2020-03-27 RX ADMIN — SPIRONOLACTONE SCH MG: 25 TABLET, FILM COATED ORAL at 09:53

## 2020-03-27 RX ADMIN — FOLIC ACID SCH MG: 1 TABLET ORAL at 09:53

## 2020-03-27 RX ADMIN — METHYLPREDNISOLONE SODIUM SUCCINATE SCH: 40 INJECTION, POWDER, FOR SOLUTION INTRAMUSCULAR; INTRAVENOUS at 09:55

## 2020-03-27 RX ADMIN — POTASSIUM CHLORIDE SCH MEQ: 750 TABLET, FILM COATED, EXTENDED RELEASE ORAL at 09:53

## 2020-03-27 RX ADMIN — Medication SCH: at 05:24

## 2020-03-27 RX ADMIN — PANTOPRAZOLE SODIUM SCH: 40 TABLET, DELAYED RELEASE ORAL at 05:24

## 2020-03-27 RX ADMIN — HEPARIN SODIUM SCH: 5000 INJECTION, SOLUTION INTRAVENOUS; SUBCUTANEOUS at 05:20

## 2020-03-27 NOTE — PDOC DISCHARGE SUMMARY
Impression





- Admit/DC Date/PCP


Admission Date/Primary Care Provider: 


  03/19/20 21:41





  VIDHYA SIMONS MD





Discharge Date: 03/27/20





- Discharge Diagnosis


(1) Alcoholic hepatitis


Is this a current diagnosis for this admission?: Yes   





(2) Alcohol use disorder, severe, dependence


Is this a current diagnosis for this admission?: Yes   





(3) Hypokalemia


Is this a current diagnosis for this admission?: Yes   





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


Is this a current diagnosis for this admission?: Yes   





(5) Polysubstance abuse


Is this a current diagnosis for this admission?: Yes   





(6) Tobacco use disorder, severe, dependence


Is this a current diagnosis for this admission?: Yes   





- Additional Information


Resuscitation Status: Full Code


Discharge Diet: As Tolerated


Discharge Activity: Activity As Tolerated


Referrals: 


VIDHYA SIMONS MD [Primary Care Provider] - 03/31/20 10:00 am


ROVERTO SALAZAR MD [ACTIVE STAFF] - 04/02/20 1:45 pm


Prescriptions: 


Spironolactone [Aldactone 25 mg Tablet] 25 mg PO Q12 #60 tablet


Folic Acid [Folvite 1 mg Tablet] 1 mg PO DAILY #30 tablet


Cholestyramine [Questran 4 gm Packet] 4 gm PO TID 20 Days #60 packet


Home Medications: 








Lactulose [Cephulac Syrup 20 gm/30 ml Udcup] 20 gm PO DAILY 30 Days #1000 ml 

03/04/20 


Pantoprazole Sodium [Protonix 40 mg Dr Tablet] 40 mg PO DAILY #14 tablet.dr 

03/04/20 


Sucralfate [Carafate 1 gm Tablet] 1 gm PO ACHS #60 tablet 03/04/20 


Albuterol Sulfate [Proventil Hfa] 6.7 gm IH Q4HP PRN 03/20/20 


Folic Acid [Folvite 1 mg Tablet] 1 mg PO DAILY #30 tablet 03/24/20 


Potassium Chloride 40 meq PO BID #20 tablet.er 03/24/20 


Spironolactone [Aldactone 25 mg Tablet] 25 mg PO Q12 #60 tablet 03/24/20 


Cholestyramine [Questran 4 gm Packet] 4 gm PO TID 20 Days #60 packet 03/27/20 











History of Present Illiness


History of Present Illness: 


HALI HARRISON is a 49 year old male who presented to the emergency room 

with acute alcohol withdrawal symptoms.  He admits a multi-decade history of 

chronic alcoholism with alcoholic cirrhosis and further admits to drinking a 

pint of bourbon after waking up this morning.  He finished his bourbon at about 

noon today and has not had any alcohol intake since.  Since that time he has 

been experiencing progressively worsening malaise, ague, nausea and diarrhea.  

He admits to numerous prior similar episodes related to alcohol withdrawal.  He 

denies other associated or accompanying signs and symptoms.  He has not 

identified any additional aggravating or ameliorating factors for his alcohol 

withdrawal symptoms.  In the emergency room he was found to have a blood alcohol

of 197 and was also noted to have a bilirubin of 18.  He was tachycardic and 

mildly hypotensive initially but improved with IV fluids.  He was also noted to 

be hypokalemic.  He was subsequently admitted to the hospital for further 

evaluation and acute detoxification treatment.








Hospital Course


Hospital Course: 


(1) Alcoholic hepatitis


Qualifiers: 


   Ascites presence: unspecified   Qualified Code(s): K70.10 - Alcoholic 

hepatitis without ascites   


Is this a current diagnosis for this admission?: Yes   


Plan: 


Elevated transaminases, hyperbilirubinemia 


Maddrey discrimination factor score was over 32 and as such patient was tried on

Solu-Medrol but had no effect with patient's alcoholic hepatitis and as such was

later discontinued.


Viral hepatitis panel 1 month ago was negative and abdominal ultrasound last 

month showed no evidence of biliary tract obstruction


Repeat abdominal ultrasound right upper quadrant also showed no biliary tract 

obstruction


Treated with supportive conservative management


Cholestyramine added for pruritus secondary to jaundice


Patient encouraged of the significance of alcohol abstinence


Plan to follow-up with GI and will need hepatologist





(2) Alcohol use disorder, severe, dependence


Is this a current diagnosis for this admission?: Yes   


Plan: 


Patient presented with alcohol intoxication.  Patient showed no evidence of 

alcohol withdrawal during stay.  Successfully weaned off benzodiazepines without

any withdrawal subsequently.








(3) Hypokalemia


Is this a current diagnosis for this admission?: Yes   


Plan: 


Discharged with potassium supplements.  Possible etiologies include Lasix, 

rifamixin/Lactulose-->rifamixin d/cd. Lactulose at 20g daily now.  No evidence 

of RTA.








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


Is this a current diagnosis for this admission?: Yes   


Plan: 


Patient clearly has chronic liver disease secondary to alcohol possibly 

cirrhosis.


MELD-Na score of 25 indicating 15% 3-month mortality if cirrhotic but patient is

clearly not a liver transplant candidate at this time due to his very recent 

polysubstance and alcohol abuse.  


Lactulose, spironolactone.  Unable to add Lasix due to hypokalemia.


Small hepatic hydrothorax noted on x-ray.  Not much ascites.  


Scheduled to follow-up with Dr. Salazar for further care.








(5) Polysubstance abuse


Is this a current diagnosis for this admission?: Yes   


Plan: 


Advised against substance abuse.








(6) Tobacco use disorder, severe, dependence


Is this a current diagnosis for this admission?: Yes   


Plan: 


Counseled on quitting.








Physical Exam


Vital Signs: 


                                        











Temp Pulse Resp BP Pulse Ox


 


 97.6 F   101 H  20   113/65   94 


 


 03/27/20 10:40  03/27/20 10:40  03/27/20 10:40  03/27/20 10:40  03/27/20 10:40








                                 Intake & Output











 03/26/20 03/27/20 03/28/20





 06:59 06:59 06:59


 


Intake Total 960 2425 


 


Output Total 505 200 


 


Balance 455 2225 


 


Weight 107.9 kg 107.9 kg 











General appearance: PRESENT: no acute distress, cooperative


Neck exam: ABSENT: JVD


Respiratory exam: PRESENT: unlabored.  ABSENT: tachypnea


Skin exam: PRESENT: jaundice





Results


Laboratory Results: 


                                        











WBC  11.5 10^3/uL (4.0-10.5)  H  03/23/20  05:12    


 


RBC  2.40 10^6/uL (4.35-5.55)  L  03/23/20  05:12    


 


Hgb  8.6 g/dL (13.5-17.0)  L  03/23/20  05:12    


 


Hct  25.7 % (37.9-51.0)  L  03/23/20  05:12    


 


MCV  107 fl (80-97)  H  03/23/20  05:12    


 


MCH  36.0 pg (27.0-33.4)  H  03/23/20  05:12    


 


MCHC  33.6 g/dL (32.0-36.0)   03/23/20  05:12    


 


RDW  18.7 % (11.5-14.0)  H  03/23/20  05:12    


 


Plt Count  220 10^3/uL (150-450)   03/23/20  05:12    


 


Lymph % (Auto)  Not Reportable   03/22/20  07:25    


 


Mono % (Auto)  Not Reportable   03/22/20  07:25    


 


Eos % (Auto)  Not Reportable   03/22/20  07:25    


 


Baso % (Auto)  Not Reportable   03/22/20  07:25    


 


Absolute Neuts (auto)  Not Reportable   03/22/20  07:25    


 


Absolute Lymphs (auto)  Not Reportable   03/22/20  07:25    


 


Absolute Monos (auto)  Not Reportable   03/22/20  07:25    


 


Absolute Eos (auto)  Not Reportable   03/22/20  07:25    


 


Absolute Basos (auto)  Not Reportable   03/22/20  07:25    


 


Total Counted  100   03/22/20  07:25    


 


Seg Neutrophils %  Not Reportable   03/22/20  07:25    


 


Seg Neuts % (Manual)  80 % (42-78)  H  03/22/20  07:25    


 


Band Neutrophils %  1 % (3-5)  L  03/22/20  07:25    


 


Lymphocytes % (Manual)  15 % (13-45)   03/22/20  07:25    


 


Monocytes % (Manual)  3 % (3-13)   03/22/20  07:25    


 


Eosinophils % (Manual)  1 % (0-6)   03/22/20  07:25    


 


Basophils % (Manual)  0 % (0-2)   03/22/20  07:25    


 


Abs Neuts (Manual)  9.6 10^3/uL (1.7-8.2)  H  03/22/20  07:25    


 


Abs Lymphs (Manual)  1.8 10^3/uL (0.5-4.7)   03/22/20  07:25    


 


Abs Monocytes (Manual)  0.4 10^3/uL (0.1-1.4)   03/22/20  07:25    


 


Absolute Eos (Manual)  0.1 10^3/uL (0.0-0.6)   03/22/20  07:25    


 


Abs Basophils (Manual)  0.0 10^3/uL (0.0-0.2)   03/22/20  07:25    


 


Toxic Vacuolation  PRESENT   03/22/20  07:25    


 


Platelet Comment  ADEQUATE   03/22/20  07:25    


 


Polychromasia  1+   03/22/20  07:25    


 


Poikilocytosis  SLIGHT   03/19/20  18:08    


 


Anisocytosis  2+   03/22/20  07:25    


 


Macrocytosis  2+   03/22/20  07:25    


 


Target Cells  1+   03/19/20  18:08    


 


Tear Drop Cells  SLIGHT   03/19/20  18:08    


 


PT  17.7 SEC (11.4-15.4)  H  03/23/20  05:12    


 


INR  1.44   03/23/20  05:12    


 


APTT  40.6 SEC (23.5-35.8)  H  03/19/20  18:56    


 


Sodium  134.1 mmol/L (137-145)  L  03/27/20  05:55    


 


Potassium  3.9 mmol/L (3.6-5.0)   03/27/20  05:55    


 


Chloride  106 mmol/L ()   03/27/20  05:55    


 


Carbon Dioxide  20 mmol/L (22-30)  L  03/27/20  05:55    


 


Anion Gap  8  (5-19)   03/27/20  05:55    


 


BUN  11 mg/dL (7-20)   03/27/20  05:55    


 


Creatinine  0.84 mg/dL (0.52-1.25)   03/27/20  05:55    


 


Est GFR ( Amer)  > 60  (>60)   03/27/20  05:55    


 


Est GFR (MDRD) Non-Af  > 60  (>60)   03/27/20  05:55    


 


Glucose  82 mg/dL ()   03/27/20  05:55    


 


Calcium  7.9 mg/dL (8.4-10.2)  L  03/27/20  05:55    


 


Magnesium  1.6 mg/dL (1.6-2.3)   03/24/20  06:05    


 


Total Bilirubin  19.2 mg/dL (0.2-1.3)  H  03/27/20  05:55    


 


Direct Bilirubin  17.5 mg/dL (0.0-0.4)  H  03/27/20  05:55    


 


Neonat Total Bilirubin  Not Reportable   03/27/20  05:55    


 


Neonat Direct Bilirubin  Not Reportable   03/27/20  05:55    


 


Neonat Indirect Bili  Not Reportable   03/27/20  05:55    


 


AST  105 U/L (17-59)  H  03/27/20  05:55    


 


ALT  52 U/L (<50)  H  03/27/20  05:55    


 


Alkaline Phosphatase  239 U/L ()  H  03/27/20  05:55    


 


Ammonia  36.7 umol/L (9-33)  H  03/20/20  17:52    


 


Total Protein  5.8 g/dL (6.3-8.2)  L  03/27/20  05:55    


 


Albumin  2.2 g/dL (3.5-5.0)  L  03/27/20  05:55    


 


Lipase  385.6 U/L ()  H  03/19/20  18:08    


 


Urine Color  YOSHI   03/19/20  17:18    


 


Urine Appearance  SLIGHTLY-CLOUDY   03/19/20  17:18    


 


Urine pH  6.0  (5.0-9.0)   03/19/20  17:18    


 


Ur Specific Gravity  1.017   03/19/20  17:18    


 


Urine Protein  30 mg/dL (NEGATIVE)  H  03/19/20  17:18    


 


Urine Glucose (UA)  NEGATIVE mg/dL (NEGATIVE)   03/19/20  17:18    


 


Urine Ketones  NEGATIVE mg/dL (NEGATIVE)   03/19/20  17:18    


 


Urine Blood  NEGATIVE  (NEGATIVE)   03/19/20  17:18    


 


Urine Nitrite (Reflex)  NEGATIVE  (NEGATIVE)   03/19/20  17:18    


 


Urine Bilirubin  MODERATE  (NEGATIVE)  H  03/19/20  17:18    


 


Urine Urobilinogen  4.0 mg/dL (<2.0)  H  03/19/20  17:18    


 


Leukocyte Esterase Rfl  NEGATIVE  (NEGATIVE)   03/19/20  17:18    


 


Urine RBC (Auto)  0 /HPF  03/19/20  17:18    


 


U Hyaline Cast (Auto)  7 /LPF  03/19/20  17:18    


 


Urine Bacteria (Auto)  2+ /HPF  03/19/20  17:18    


 


Urine WBC (Reflex)  4 /HPF  03/19/20  17:18    


 


Squamous Epi Cells Auto  1 /HPF  03/19/20  17:18    


 


Urine Mucus (Auto)  OCC /LPF  03/19/20  17:18    


 


Urine Ascorbic Acid  NEGATIVE  (NEGATIVE)   03/19/20  17:18    


 


Urine Opiates Screen  NEGATIVE   03/19/20  17:18    


 


Urine Methadone Screen  NEGATIVE   03/19/20  17:18    


 


Ur Barbiturates Screen  NEGATIVE   03/19/20  17:18    


 


Ur Phencyclidine Scrn  NEGATIVE   03/19/20  17:18    


 


Ur Amphetamines Screen  NEGATIVE   03/19/20  17:18    


 


U Benzodiazepines Scrn  UNCONFIRMED POSITIVE   03/19/20  17:18    


 


Urine Cocaine Screen  NEGATIVE   03/19/20  17:18    


 


U Marijuana (THC) Screen  NEGATIVE   03/19/20  17:18    


 


Serum Alcohol  191 mg/dL (NONE DETECTED)   03/19/20  18:08    











Impressions: 


                                        





Chest X-Ray  03/19/20 18:31


IMPRESSION:


 


Significantly elevated right hemidiaphragm with suspected small


right pleural effusion/right basilar opacity. Consider


superimposed atelectasis or pneumonia to include aspiration.


 


 


copyright 2011 [employer]- All Rights Reserved


 








Abdomen Ultrasound  03/25/20 00:00


IMPRESSION:  Hepatomegaly.  Hepatic steatosis.  There is some ascites.  

Cholelithiasis with thickening of gallbladder wall and pericholecystic fluid.  

Negative sonographic Shen sign.


 














Plan


Time Spent: Less than 30 Minutes





Stroke


Is this a Stroke Patient?: No





Acute Heart Failure





- **


Is this a Heart Failure Patient?: No

## 2025-01-13 NOTE — ER DOCUMENT REPORT
ED Medical Screen (RME)





- General


Chief Complaint: Alcohol Withdrawl


Stated Complaint: ETOH WITHDRAWAL


Time Seen by Provider: 12/31/19 11:15


Primary Care Provider: 


VIDHYA SIMONS MD [Primary Care Provider] - Follow up as needed


Mode of Arrival: Ambulatory


Information source: Patient


Notes: 





49-year-old male presents to the emergency department with EtOH abuse and 

withdrawal.  Reports he is in the habit of drinking 1/5 of bourbon a day for the

past 10 years.  He reports he usually starts off his morning drinking but he has

not this morning.  Last drink was 0200 this morning.  Patient reports he has had

abdominal pain nausea and vomiting for the last couple days.  Report he vomited 

several times on the way here.  Patient is very shaky.  Denies drugs.











I have greeted and performed a rapid initial assessment of this patient.  A 

comprehensive ED assessment and evaluation of the patient, analysis of test 

results and completion of the medical decision making process will be conducted 

by additional ED providers.


TRAVEL OUTSIDE OF THE [place] IN LAST 30 DAYS: No





- Related Data


Allergies/Adverse Reactions: 


                                        





No Known Allergies Allergy (Verified 11/19/19 11:26)


   











Past Medical History





- Social History


Chew tobacco use (# tins/day): No


Frequency of alcohol use: 1/5th Alcohol daily


Drug Abuse: None





Physical Exam





- Vital signs


Vitals: 





                                        











Temp Pulse Resp BP Pulse Ox


 


 97.5 F   139 H  20   177/106 H  98 


 


 12/31/19 11:04  12/31/19 11:04  12/31/19 11:04  12/31/19 11:04  12/31/19 11:04














Course





- Vital Signs


Vital signs: 





                                        











Temp Pulse Resp BP Pulse Ox


 


 97.5 F   139 H  20   177/106 H  98 


 


 12/31/19 11:04  12/31/19 11:04  12/31/19 11:04  12/31/19 11:04  12/31/19 11:04














Doctor's Discharge





- Discharge


Referrals: 


VIDHYA SIMONS MD [Primary Care Provider] - Follow up as needed Return in 4 months    No alcohol intake . Will start hep c treatment    So far liver looks good    Get bone density scan done to check bones